# Patient Record
Sex: FEMALE | Race: WHITE | NOT HISPANIC OR LATINO
[De-identification: names, ages, dates, MRNs, and addresses within clinical notes are randomized per-mention and may not be internally consistent; named-entity substitution may affect disease eponyms.]

---

## 2020-01-17 ENCOUNTER — APPOINTMENT (OUTPATIENT)
Dept: OTOLARYNGOLOGY | Facility: CLINIC | Age: 70
End: 2020-01-17
Payer: MEDICARE

## 2020-01-17 VITALS — BODY MASS INDEX: 18.44 KG/M2 | HEIGHT: 64 IN | WEIGHT: 108 LBS

## 2020-01-17 VITALS
OXYGEN SATURATION: 98 % | SYSTOLIC BLOOD PRESSURE: 165 MMHG | TEMPERATURE: 98.3 F | HEART RATE: 86 BPM | DIASTOLIC BLOOD PRESSURE: 81 MMHG

## 2020-01-17 DIAGNOSIS — R42 DIZZINESS AND GIDDINESS: ICD-10-CM

## 2020-01-17 PROBLEM — Z00.00 ENCOUNTER FOR PREVENTIVE HEALTH EXAMINATION: Status: ACTIVE | Noted: 2020-01-17

## 2020-01-17 PROCEDURE — 99204 OFFICE O/P NEW MOD 45 MIN: CPT

## 2020-01-18 PROBLEM — R42 DISEQUILIBRIUM: Status: ACTIVE | Noted: 2020-01-18

## 2020-01-18 PROBLEM — R42 VERTIGO: Status: ACTIVE | Noted: 2020-01-17

## 2020-01-18 RX ORDER — ESTRADIOL 10 UG/1
TABLET, FILM COATED VAGINAL
Refills: 0 | Status: ACTIVE | COMMUNITY

## 2020-01-18 RX ORDER — PROGESTERONE 200 MG/1
CAPSULE ORAL
Refills: 0 | Status: ACTIVE | COMMUNITY

## 2020-01-18 RX ORDER — ASPIRIN 325 MG/1
TABLET, FILM COATED ORAL
Refills: 0 | Status: ACTIVE | COMMUNITY

## 2020-01-18 RX ORDER — B-COMPLEX WITH VITAMIN C
TABLET ORAL
Refills: 0 | Status: ACTIVE | COMMUNITY

## 2020-01-18 NOTE — PHYSICAL EXAM
[Midline] : trachea located in midline position [de-identified] : gait steady [Normal] : no rashes [] : [place]-Hallpike test is negative

## 2020-01-18 NOTE — DATA REVIEWED
[de-identified] : outside mri from 2015 and 2018 no vonda - mastoid congestion, sinus congestion and age related changes

## 2020-01-18 NOTE — HISTORY OF PRESENT ILLNESS
[de-identified] : 68 yo woman with h/o disequlibrium in the past for at least 5 yrs  last weekend had true vertigo - now is back to disequilibrium - happened with posn's ,improved over hours then happened the next day - on web it said to elevate hob - brought outside records. She feels the sensation is severe when it happens. denies hearing changes or tinnitus. no uri reently. no fh relevant to cc. nonsmoker.

## 2020-01-21 ENCOUNTER — APPOINTMENT (OUTPATIENT)
Dept: OTOLARYNGOLOGY | Facility: CLINIC | Age: 70
End: 2020-01-21
Payer: MEDICARE

## 2020-01-21 ENCOUNTER — APPOINTMENT (OUTPATIENT)
Dept: OTOLARYNGOLOGY | Facility: CLINIC | Age: 70
End: 2020-01-21

## 2020-01-21 VITALS
TEMPERATURE: 98.3 F | OXYGEN SATURATION: 99 % | HEART RATE: 93 BPM | DIASTOLIC BLOOD PRESSURE: 75 MMHG | SYSTOLIC BLOOD PRESSURE: 157 MMHG

## 2020-01-21 PROCEDURE — 99214 OFFICE O/P EST MOD 30 MIN: CPT

## 2020-01-22 NOTE — HISTORY OF PRESENT ILLNESS
[de-identified] : followup 70 yo woman with dizziness - she did not have  or vng because she said she had vng yrs ago and was afraid it would make her dizzy. she had mri and is here to review the results. he dizziness is bothering her a lot but her gait is rapid and steady. it is neither worsening or improving since last visit 3 d ago;

## 2020-01-22 NOTE — DATA REVIEWED
[de-identified] : mri with scant l mastoid opacification unchanged from prior study 1.5 yrs ago and age related changes

## 2020-01-23 ENCOUNTER — APPOINTMENT (OUTPATIENT)
Dept: OTOLARYNGOLOGY | Facility: CLINIC | Age: 70
End: 2020-01-23
Payer: MEDICARE

## 2020-01-23 VITALS
HEART RATE: 87 BPM | SYSTOLIC BLOOD PRESSURE: 147 MMHG | TEMPERATURE: 97.2 F | OXYGEN SATURATION: 98 % | DIASTOLIC BLOOD PRESSURE: 75 MMHG

## 2020-01-23 DIAGNOSIS — R42 DIZZINESS AND GIDDINESS: ICD-10-CM

## 2020-01-23 DIAGNOSIS — H92.02 OTALGIA, LEFT EAR: ICD-10-CM

## 2020-01-23 PROCEDURE — 99214 OFFICE O/P EST MOD 30 MIN: CPT

## 2020-01-23 PROCEDURE — 92550 TYMPANOMETRY & REFLEX THRESH: CPT

## 2020-01-23 PROCEDURE — 92557 COMPREHENSIVE HEARING TEST: CPT

## 2020-01-24 PROBLEM — R42 DIZZINESS: Status: ACTIVE | Noted: 2020-01-22

## 2020-01-24 PROBLEM — H92.02 OTALGIA, LEFT EAR: Status: ACTIVE | Noted: 2020-01-24

## 2020-01-24 NOTE — REASON FOR VISIT
[Subsequent Evaluation] : a subsequent evaluation for [FreeTextEntry2] : followup dizziness and otalgia (left)

## 2020-01-24 NOTE — PHYSICAL EXAM
[de-identified] : l>r [] : [place]-Hallpike test is negative [Normal] : no rashes [de-identified] : gait steady

## 2020-01-24 NOTE — HISTORY OF PRESENT ILLNESS
[de-identified] : followup dizziness and l otalgia - she had ct and is here to review because of small abnormality in l tm - she was concerned about it and wanted more investigaion. -f.s.c - the otalgia is dull and moderate. denies tmj.

## 2020-01-31 ENCOUNTER — APPOINTMENT (OUTPATIENT)
Dept: OTOLARYNGOLOGY | Facility: CLINIC | Age: 70
End: 2020-01-31

## 2025-04-05 VITALS
RESPIRATION RATE: 18 BRPM | HEART RATE: 105 BPM | TEMPERATURE: 98 F | OXYGEN SATURATION: 98 % | SYSTOLIC BLOOD PRESSURE: 157 MMHG | DIASTOLIC BLOOD PRESSURE: 83 MMHG | WEIGHT: 108.03 LBS

## 2025-04-05 LAB
ALBUMIN SERPL ELPH-MCNC: 4.5 G/DL — SIGNIFICANT CHANGE UP (ref 3.3–5)
ALP SERPL-CCNC: 83 U/L — SIGNIFICANT CHANGE UP (ref 40–120)
ALT FLD-CCNC: 22 U/L — SIGNIFICANT CHANGE UP (ref 10–45)
ANION GAP SERPL CALC-SCNC: 16 MMOL/L — SIGNIFICANT CHANGE UP (ref 5–17)
AST SERPL-CCNC: 30 U/L — SIGNIFICANT CHANGE UP (ref 10–40)
BASOPHILS # BLD AUTO: 0.05 K/UL — SIGNIFICANT CHANGE UP (ref 0–0.2)
BASOPHILS NFR BLD AUTO: 0.3 % — SIGNIFICANT CHANGE UP (ref 0–2)
BILIRUB DIRECT SERPL-MCNC: 0.2 MG/DL — SIGNIFICANT CHANGE UP (ref 0–0.3)
BILIRUB INDIRECT FLD-MCNC: 0.5 MG/DL — SIGNIFICANT CHANGE UP (ref 0.2–1)
BILIRUB SERPL-MCNC: 0.7 MG/DL — SIGNIFICANT CHANGE UP (ref 0.2–1.2)
BUN SERPL-MCNC: 20 MG/DL — SIGNIFICANT CHANGE UP (ref 7–23)
CALCIUM SERPL-MCNC: 9.6 MG/DL — SIGNIFICANT CHANGE UP (ref 8.4–10.5)
CHLORIDE SERPL-SCNC: 101 MMOL/L — SIGNIFICANT CHANGE UP (ref 96–108)
CO2 SERPL-SCNC: 22 MMOL/L — SIGNIFICANT CHANGE UP (ref 22–31)
CREAT SERPL-MCNC: 0.74 MG/DL — SIGNIFICANT CHANGE UP (ref 0.5–1.3)
EGFR: 85 ML/MIN/1.73M2 — SIGNIFICANT CHANGE UP
EGFR: 85 ML/MIN/1.73M2 — SIGNIFICANT CHANGE UP
EOSINOPHIL # BLD AUTO: 0 K/UL — SIGNIFICANT CHANGE UP (ref 0–0.5)
EOSINOPHIL NFR BLD AUTO: 0 % — SIGNIFICANT CHANGE UP (ref 0–6)
GLUCOSE SERPL-MCNC: 127 MG/DL — HIGH (ref 70–99)
HCT VFR BLD CALC: 44.6 % — SIGNIFICANT CHANGE UP (ref 34.5–45)
HGB BLD-MCNC: 14.9 G/DL — SIGNIFICANT CHANGE UP (ref 11.5–15.5)
IMM GRANULOCYTES NFR BLD AUTO: 0.4 % — SIGNIFICANT CHANGE UP (ref 0–0.9)
LACTATE SERPL-SCNC: 2.1 MMOL/L — HIGH (ref 0.5–2)
LIDOCAIN IGE QN: 19 U/L — SIGNIFICANT CHANGE UP (ref 7–60)
LYMPHOCYTES # BLD AUTO: 1.13 K/UL — SIGNIFICANT CHANGE UP (ref 1–3.3)
LYMPHOCYTES # BLD AUTO: 6.9 % — LOW (ref 13–44)
MCHC RBC-ENTMCNC: 29.5 PG — SIGNIFICANT CHANGE UP (ref 27–34)
MCHC RBC-ENTMCNC: 33.4 G/DL — SIGNIFICANT CHANGE UP (ref 32–36)
MCV RBC AUTO: 88.3 FL — SIGNIFICANT CHANGE UP (ref 80–100)
MONOCYTES # BLD AUTO: 0.7 K/UL — SIGNIFICANT CHANGE UP (ref 0–0.9)
MONOCYTES NFR BLD AUTO: 4.3 % — SIGNIFICANT CHANGE UP (ref 2–14)
NEUTROPHILS # BLD AUTO: 14.4 K/UL — HIGH (ref 1.8–7.4)
NEUTROPHILS NFR BLD AUTO: 88.1 % — HIGH (ref 43–77)
NRBC BLD AUTO-RTO: 0 /100 WBCS — SIGNIFICANT CHANGE UP (ref 0–0)
PLATELET # BLD AUTO: 242 K/UL — SIGNIFICANT CHANGE UP (ref 150–400)
POTASSIUM SERPL-MCNC: 4 MMOL/L — SIGNIFICANT CHANGE UP (ref 3.5–5.3)
POTASSIUM SERPL-SCNC: 4 MMOL/L — SIGNIFICANT CHANGE UP (ref 3.5–5.3)
PROT SERPL-MCNC: 7.3 G/DL — SIGNIFICANT CHANGE UP (ref 6–8.3)
RBC # BLD: 5.05 M/UL — SIGNIFICANT CHANGE UP (ref 3.8–5.2)
RBC # FLD: 13.5 % — SIGNIFICANT CHANGE UP (ref 10.3–14.5)
SODIUM SERPL-SCNC: 139 MMOL/L — SIGNIFICANT CHANGE UP (ref 135–145)
WBC # BLD: 16.34 K/UL — HIGH (ref 3.8–10.5)
WBC # FLD AUTO: 16.34 K/UL — HIGH (ref 3.8–10.5)

## 2025-04-05 PROCEDURE — 99285 EMERGENCY DEPT VISIT HI MDM: CPT | Mod: FS

## 2025-04-05 PROCEDURE — 74176 CT ABD & PELVIS W/O CONTRAST: CPT | Mod: 26

## 2025-04-05 RX ORDER — IOHEXOL 350 MG/ML
30 INJECTION, SOLUTION INTRAVENOUS ONCE
Refills: 0 | Status: COMPLETED | OUTPATIENT
Start: 2025-04-05 | End: 2025-04-05

## 2025-04-05 RX ADMIN — Medication 1000 MILLILITER(S): at 20:21

## 2025-04-05 RX ADMIN — IOHEXOL 30 MILLILITER(S): 350 INJECTION, SOLUTION INTRAVENOUS at 19:59

## 2025-04-05 NOTE — ED PROVIDER NOTE - PROGRESS NOTE DETAILS
Patient refusing IV contrast, mentions 2 previous reactions to contrast.  One reaction was infiltration of contrast into arm.  One reaction was delayed erythema/rash from chest up (no respiratory compromise, vomiting, or diarrhea).  Discussed diagnostic options with patient and , including suboptimal CT without IV contrast and premedication for CTAP +/+.  After consideration of risks and benefits patient selects CT without IV contrast. CT remarkable for cecal bascule.  Case discussed with surgery consult team, pending recommendations. Will sign out to Dr. Che: 74F with LLQ abd pain and bloody stool, found on CT with cecal bascule, pending surgery recommendations.  Dispo as per surgery. Ferchoel - surgery recommending repeat CT scan without IV contrast; once performed can be admitted to surgery/telemetry.

## 2025-04-05 NOTE — ED ADULT NURSE NOTE - OBJECTIVE STATEMENT
Pt is a 75yo female presenting to ED c/o abdominal pain. Pt reports having 5/10 LLQ abdominal pain beginning at 0700 this morning along with 5 episodes of diarrhea that started possessing blood at 1500. Pt abdomen noted to be soft, nondistended, NTTP. Pt A&Ox4, breathing even and unlabored speaking in clear full sentences, ambulatory with steady gait, denies lightheadedness, dizziness, n/v, h/a, c/p, sob, f/c, vital signs stable, placed in gown.

## 2025-04-05 NOTE — ED PROVIDER NOTE - OBJECTIVE STATEMENT
74F with hx ischemic colitis, no past medical history, now with 1 day of LLQ abdominal pain and bloody stool.  No vomiting.  Abdominal pain now improving.  Feels like past episode of ischemic colitis (no previous etiology identified).  No fever, chest pain, or trouble breathing.  No dysuria or vaginal bleeding.    Currently declines pain medication.

## 2025-04-05 NOTE — ED PROVIDER NOTE - NS_EDPROVIDERDISPOUSERTYPE_ED_A_ED
RN attempted to call patient back to assess low dose induction status. The call went to . RN left a message to call the clinic back for a check in. RN encouraged patient to present to [facility] beginning Monday 3/11/2024. Walk-in hours provided.     Routing update to provider.     Janis Knight RN on 3/8/2024 at 2:54 PM     Attending Attestation (For Attendings USE Only)...

## 2025-04-05 NOTE — ED PROVIDER NOTE - PHYSICAL EXAMINATION
General: comfortable, resting in ED  HEENT: atraumatic, no eye erythema or discharge  Pulm: no cyanosis, no added work of breathing  Cardiac: no pallor, intact peripheral pulse  GI: no abdominal distension, nontender abdomen in all 4 quadrants  Neuro: alert, conversant  Psych: neutral affect, cooperative  Msk: no gross deformity or instability  Skin: no erythema or rash    Had bowel movement in ED with red formed stool but no bright red blood or melena.

## 2025-04-05 NOTE — ED PROVIDER NOTE - CLINICAL SUMMARY MEDICAL DECISION MAKING FREE TEXT BOX
Concern for abdominal pain of unclear etiology.  Given age, despite nontender abdomen must r/o acute intra-abdominal process ?sbo ?perforation ?appendicitis ?diverticulitis ?intussusception ?volvulus.  Will screen for bowel ischemia via lactate and will screen for severe sequelae of bowel ischemia on CT.  R/o gross metabolic abnormality in the setting of bloody diarrhea ?anemia ?hypoglycemia ?calixto ?hypernatremia.  Patient currently declining pain medication but will reassess as needed.

## 2025-04-05 NOTE — ED ADULT NURSE REASSESSMENT NOTE - NS ED NURSE REASSESS COMMENT FT1
Received report from Carlin ORELLANA . Received patient in stretcher. AOX4. Vital signs as noted in flowsheet.  Patient denies chest pain, shortness of breath, difficulty breathing and any form of distress not noted. Patient oriented to ED area. Plan of care discussed and verbalized understanding. All needs attended. Purposeful proactive hourly rounding in progress. (2) probably inadequate

## 2025-04-06 ENCOUNTER — INPATIENT (INPATIENT)
Facility: HOSPITAL | Age: 75
LOS: 4 days | Discharge: HOME | End: 2025-04-11
Attending: SURGERY | Admitting: SURGERY
Payer: MEDICARE

## 2025-04-06 LAB
ADD ON TEST-SPECIMEN IN LAB: SIGNIFICANT CHANGE UP
ANION GAP SERPL CALC-SCNC: 10 MMOL/L — SIGNIFICANT CHANGE UP (ref 5–17)
BUN SERPL-MCNC: 14 MG/DL — SIGNIFICANT CHANGE UP (ref 7–23)
C DIFF GDH STL QL: NEGATIVE — SIGNIFICANT CHANGE UP
C DIFF GDH STL QL: SIGNIFICANT CHANGE UP
CALCIUM SERPL-MCNC: 8.5 MG/DL — SIGNIFICANT CHANGE UP (ref 8.4–10.5)
CHLORIDE SERPL-SCNC: 102 MMOL/L — SIGNIFICANT CHANGE UP (ref 96–108)
CO2 SERPL-SCNC: 25 MMOL/L — SIGNIFICANT CHANGE UP (ref 22–31)
CREAT SERPL-MCNC: 0.69 MG/DL — SIGNIFICANT CHANGE UP (ref 0.5–1.3)
EGFR: 92 ML/MIN/1.73M2 — SIGNIFICANT CHANGE UP
EGFR: 92 ML/MIN/1.73M2 — SIGNIFICANT CHANGE UP
GI PCR PANEL: SIGNIFICANT CHANGE UP
GLUCOSE SERPL-MCNC: 101 MG/DL — HIGH (ref 70–99)
HCT VFR BLD CALC: 37.4 % — SIGNIFICANT CHANGE UP (ref 34.5–45)
HCT VFR BLD CALC: 39 % — SIGNIFICANT CHANGE UP (ref 34.5–45)
HGB BLD-MCNC: 12.4 G/DL — SIGNIFICANT CHANGE UP (ref 11.5–15.5)
HGB BLD-MCNC: 13.2 G/DL — SIGNIFICANT CHANGE UP (ref 11.5–15.5)
LACTATE SERPL-SCNC: 1 MMOL/L — SIGNIFICANT CHANGE UP (ref 0.5–2)
MAGNESIUM SERPL-MCNC: 2 MG/DL — SIGNIFICANT CHANGE UP (ref 1.6–2.6)
MCHC RBC-ENTMCNC: 29.2 PG — SIGNIFICANT CHANGE UP (ref 27–34)
MCHC RBC-ENTMCNC: 29.9 PG — SIGNIFICANT CHANGE UP (ref 27–34)
MCHC RBC-ENTMCNC: 33.2 G/DL — SIGNIFICANT CHANGE UP (ref 32–36)
MCHC RBC-ENTMCNC: 33.8 G/DL — SIGNIFICANT CHANGE UP (ref 32–36)
MCV RBC AUTO: 88.2 FL — SIGNIFICANT CHANGE UP (ref 80–100)
MCV RBC AUTO: 88.4 FL — SIGNIFICANT CHANGE UP (ref 80–100)
NRBC BLD AUTO-RTO: 0 /100 WBCS — SIGNIFICANT CHANGE UP (ref 0–0)
NRBC BLD AUTO-RTO: 0 /100 WBCS — SIGNIFICANT CHANGE UP (ref 0–0)
PHOSPHATE SERPL-MCNC: 3.1 MG/DL — SIGNIFICANT CHANGE UP (ref 2.5–4.5)
PLATELET # BLD AUTO: 207 K/UL — SIGNIFICANT CHANGE UP (ref 150–400)
PLATELET # BLD AUTO: 209 K/UL — SIGNIFICANT CHANGE UP (ref 150–400)
POTASSIUM SERPL-MCNC: 3.6 MMOL/L — SIGNIFICANT CHANGE UP (ref 3.5–5.3)
POTASSIUM SERPL-SCNC: 3.6 MMOL/L — SIGNIFICANT CHANGE UP (ref 3.5–5.3)
RBC # BLD: 4.24 M/UL — SIGNIFICANT CHANGE UP (ref 3.8–5.2)
RBC # BLD: 4.41 M/UL — SIGNIFICANT CHANGE UP (ref 3.8–5.2)
RBC # FLD: 13.4 % — SIGNIFICANT CHANGE UP (ref 10.3–14.5)
RBC # FLD: 13.6 % — SIGNIFICANT CHANGE UP (ref 10.3–14.5)
SODIUM SERPL-SCNC: 137 MMOL/L — SIGNIFICANT CHANGE UP (ref 135–145)
WBC # BLD: 10.96 K/UL — HIGH (ref 3.8–10.5)
WBC # BLD: 12.71 K/UL — HIGH (ref 3.8–10.5)
WBC # FLD AUTO: 10.96 K/UL — HIGH (ref 3.8–10.5)
WBC # FLD AUTO: 12.71 K/UL — HIGH (ref 3.8–10.5)

## 2025-04-06 PROCEDURE — 74176 CT ABD & PELVIS W/O CONTRAST: CPT | Mod: 26

## 2025-04-06 PROCEDURE — 93010 ELECTROCARDIOGRAM REPORT: CPT

## 2025-04-06 PROCEDURE — 99223 1ST HOSP IP/OBS HIGH 75: CPT

## 2025-04-06 RX ORDER — HEPARIN SODIUM 1000 [USP'U]/ML
5000 INJECTION INTRAVENOUS; SUBCUTANEOUS EVERY 8 HOURS
Refills: 0 | Status: DISCONTINUED | OUTPATIENT
Start: 2025-04-06 | End: 2025-04-06

## 2025-04-06 RX ORDER — ACETAMINOPHEN 500 MG/5ML
1000 LIQUID (ML) ORAL EVERY 8 HOURS
Refills: 0 | Status: DISCONTINUED | OUTPATIENT
Start: 2025-04-06 | End: 2025-04-06

## 2025-04-06 RX ORDER — ASPIRIN 325 MG
1 TABLET ORAL
Refills: 0 | DISCHARGE

## 2025-04-06 RX ORDER — ONDANSETRON HCL/PF 4 MG/2 ML
4 VIAL (ML) INJECTION EVERY 6 HOURS
Refills: 0 | Status: DISCONTINUED | OUTPATIENT
Start: 2025-04-06 | End: 2025-04-11

## 2025-04-06 RX ORDER — PROGESTERONE 200 MG/1
1 CAPSULE ORAL
Refills: 0 | DISCHARGE

## 2025-04-06 RX ORDER — METRONIDAZOLE 250 MG
500 TABLET ORAL EVERY 8 HOURS
Refills: 0 | Status: DISCONTINUED | OUTPATIENT
Start: 2025-04-06 | End: 2025-04-11

## 2025-04-06 RX ORDER — ESTRADIOL 0.05MG/24H
1 PATCH, TRANSDERMAL SEMIWEEKLY TRANSDERMAL
Refills: 0 | DISCHARGE

## 2025-04-06 RX ORDER — HYDROMORPHONE/SOD CHLOR,ISO/PF 2 MG/10 ML
0.5 SYRINGE (ML) INJECTION
Refills: 0 | Status: DISCONTINUED | OUTPATIENT
Start: 2025-04-06 | End: 2025-04-07

## 2025-04-06 RX ORDER — CEFTRIAXONE 500 MG/1
1000 INJECTION, POWDER, FOR SOLUTION INTRAMUSCULAR; INTRAVENOUS EVERY 24 HOURS
Refills: 0 | Status: DISCONTINUED | OUTPATIENT
Start: 2025-04-06 | End: 2025-04-11

## 2025-04-06 RX ORDER — SODIUM CHLORIDE 9 G/1000ML
1000 INJECTION, SOLUTION INTRAVENOUS
Refills: 0 | Status: DISCONTINUED | OUTPATIENT
Start: 2025-04-06 | End: 2025-04-07

## 2025-04-06 RX ORDER — ACETAMINOPHEN 500 MG/5ML
1000 LIQUID (ML) ORAL EVERY 6 HOURS
Refills: 0 | Status: DISCONTINUED | OUTPATIENT
Start: 2025-04-06 | End: 2025-04-07

## 2025-04-06 RX ORDER — HEPARIN SODIUM 1000 [USP'U]/ML
5000 INJECTION INTRAVENOUS; SUBCUTANEOUS EVERY 12 HOURS
Refills: 0 | Status: DISCONTINUED | OUTPATIENT
Start: 2025-04-06 | End: 2025-04-06

## 2025-04-06 RX ADMIN — Medication 100 MILLIGRAM(S): at 14:44

## 2025-04-06 RX ADMIN — CEFTRIAXONE 100 MILLIGRAM(S): 500 INJECTION, POWDER, FOR SOLUTION INTRAMUSCULAR; INTRAVENOUS at 09:04

## 2025-04-06 RX ADMIN — Medication 20 MILLIGRAM(S): at 13:06

## 2025-04-06 RX ADMIN — Medication 100 MILLIGRAM(S): at 21:39

## 2025-04-06 RX ADMIN — SODIUM CHLORIDE 75 MILLILITER(S): 9 INJECTION, SOLUTION INTRAVENOUS at 03:22

## 2025-04-06 NOTE — H&P ADULT - ATTENDING COMMENTS
Feeling better.  Afebrile.  Passing some flatus. Had BMs with some blood. No quentin blood per rectum.  Repeat CT - no cecal bascule. No SBO or LBO.  Inflammatory changes in colon as described. Favor infectious changes over ischemic.  Labs noted.   Continue current care.  Await GI service input. Feeling better.  Afebrile.  Passing some flatus. Had BMs with some blood. No quentin blood per rectum. However last night in ER had some blood clots per rectum.  Repeat CT - no cecal bascule. No SBO or LBO.  Per patient she had a similar episode 6-7 years ago and was told she had ischemic colitis at the time. Had several colonoscopies and EGDs since. Has GERG, hiatal hernia. Colonoscopies per patient were normal. She will attempt to download her reports from patient portal ([facility])  Inflammatory changes in colon as described. GI PCR negative.  Continue current care.  Await GI service input.  Will discus radiologic study(CTA vs MRA with radiology - patient thinks she has allergy to iodine).

## 2025-04-06 NOTE — CONSULT NOTE ADULT - SUBJECTIVE AND OBJECTIVE BOX
INTERNAL MEDICINE - INITIAL CONSULT NOTE    ISRRAELCHULA UNDERWOOD  [id number]    Patient is a 74y old  Female who presents with a chief complaint of Colitis (06 Apr 2025 03:27)      HPI:  75yo Female pt with PMH of diverticulitis, ischemic colitis (2016), PSx of adenoidectomy, hysteroscopy. Presents due to LLQ pain that has been on-going for past 24 hours, vague and varies between sharp to dull, pt feels the pain is similar to pain from prior ischemic colitis episodes, no nausea or vomiting, no fevers or chills, loose stools (multiple since yesterday) 5-6 loose stools in the ED and a few small fresh blood specs in the toilet bowl. Due to persistent fresh specs of blood, patient decided to come to the ED.      PMH: Diverticulitis; Ischemic colitis   PSHx: adenoidectomy; hystereoscopy   Medications: 0.5mg Estrogen daily and 100mg Progesterone - every 3rd day (for post menopausal); ASA 81 q3 days   Allergies: NKDA; IV contrast allergy (hives)  Social Hx: quit smoking >20 years ago   Family Hx: Denies family hx of IBS, Crohn's, UC, or colon cancer.  Last colonoscopy: 2022 and normal  Last EGD: >10 years ago         T(C): 36.5 (04-05-25 @ 22:05), Max: 36.6 (04-05-25 @ 19:14)  HR: 103 (04-05-25 @ 22:05) (89 - 105)  BP: 180/77 (04-05-25 @ 22:05) (157/83 - 180/77)  RR: 18 (04-05-25 @ 22:05) (17 - 18)  SpO2: 99% (04-05-25 @ 22:05) (98% - 99%)        General: AAOx3, NAD, laying comfortably in bed  Cardio: S1,S2, normotensive; normal HR   Pulm: Nonlabored breathing  Abdomen: soft; non-distended; mildly tender in the LLQ, no rebound or guarding  Extremities: WWP, peripheral pulses appreciated          LABS:                        14.9   16.34 )-----------( 242      ( 05 Apr 2025 19:37 )             44.6     04-05    139  |  101  |  20  ----------------------------<  127[H]  4.0   |  22  |  0.74    Ca    9.6      05 Apr 2025 19:37    TPro  7.3  /  Alb  4.5  /  TBili  0.7  /  DBili  0.2  /  AST  30  /  ALT  22  /  AlkPhos  83  04-05      IMAGING     FINDINGS:  LOWER CHEST: Within normal limits.    LIVER: Within normal limits.  BILE DUCTS: Normal caliber.  GALLBLADDER: Within normal limits.  SPLEEN: Within normal limits.  PANCREAS: Within normal limits.  ADRENALS: Within normal limits.  KIDNEYS/URETERS: Within normal limits.    BLADDER: Within normal limits.  REPRODUCTIVE ORGANS: Enlarged myomatous uterus.    BOWEL: The cecum is in a superior position without evidence of volvulus this likely represents a cecal bascule. The descending colon and proximal sigmoid colon may be thickened versus underdistended. Limited evaluation without intravenous contrast. No bowel obstruction. Appendix is normal.  PERITONEUM/RETROPERITONEUM: Within normal limits.  VESSELS: Atherosclerotic changes.  LYMPH NODES: No lymphadenopathy.  ABDOMINAL WALL: Within normal limits.  BONES: Within normal limits.    IMPRESSION: Limited evaluation without intravenous contrast.    The cecum is in a superior position without evidence of volvulus this likely represents a cecal bascule. The descending colon and proximal sigmoid colon may be thickened versus underdistended. Limited evaluation without intravenous contrast. No bowel obstruction.    --- End of Report ---    JOS STALEY MD; Attending Radiologist  This document has been electronically signed. Apr 5 2025 9:53PM      REPEAT  CT   FINDINGS:  LOWER CHEST: Within normal limits.    LIVER: Within normal limits.  BILE DUCTS: Normal caliber.  GALLBLADDER:Within normal limits.  SPLEEN: Within normal limits.  PANCREAS: Within normal limits.  ADRENALS: Within normal limits.  KIDNEYS/URETERS: Within normal limits.    BLADDER: Within normal limits.  REPRODUCTIVE ORGANS: Fibroid uterus.    BOWEL: The cecumis now in normal position. The descending colon and   sigmoid colon are likely thickened and inflammatory changes are seen   surrounding the descending colon for example (3:81). This is better seen   on this exam when compared to the prior. Findings suspicious for colitis.   Correlate clinically. No bowel obstruction. Appendix is normal.  PERITONEUM/RETROPERITONEUM: Within normal limits.  VESSELS: Atherosclerotic changes.  LYMPH NODES: No lymphadenopathy.  ABDOMINAL WALL: Within normal limits.  BONES: Within normal limits.    IMPRESSION:    The cecum is now in normal position. The descending colon and sigmoid   colon are likely thickened and inflammatory changes are seen surrounding   the descending colon for example (3:81). This is better seen on this exam   when compared to the prior. Findings suspicious for colitis. Correlate   clinically.    --- End of Report ---    FRANKIE ADHIKARI MD; Resident Radiologist  This document has been electronically signed.  JOS STALEY MD; Attending Radiologist  This document has been electronically signed. Apr 6 2025  2:39AM           (06 Apr 2025 03:27)      PAST MEDICAL & SURGICAL HISTORY:      acetaminophen     Tablet .. 1000 milliGRAM(s) Oral every 6 hours  cefTRIAXone   IVPB 1000 milliGRAM(s) IV Intermittent every 24 hours  heparin   Injectable 5000 Unit(s) SubCutaneous every 12 hours  HYDROmorphone  Injectable 0.5 milliGRAM(s) IV Push every 3 hours PRN  lactated ringers. 1000 milliLiter(s) IV Continuous <Continuous>  metroNIDAZOLE  IVPB 500 milliGRAM(s) IV Intermittent every 8 hours  ondansetron Injectable 4 milliGRAM(s) IV Push every 6 hours PRN  pantoprazole  Injectable 20 milliGRAM(s) IV Push daily      FAMILY HISTORY:      REVIEW OF SYSTEMS:  CONSTITUTIONAL: No weakness, fever, or chills  EYES: No eye pain or discharge  ENMT:  No sinus or throat pain  NECK: No pain or stiffness  RESPIRATORY: No cough, wheezing, chills or hemoptysis; No shortness of breath  CARDIOVASCULAR: No chest pain, palpitations, dizziness, or leg swelling  GASTROINTESTINAL: No abdominal or epigastric pain. No nausea, vomiting, or hematemesis; No diarrhea or constipation. No melena or hematochezia.  GENITOURINARY: No dysuria or incontinence  NEUROLOGICAL: No headaches, memory loss, loss of strength, numbness, or tremors  SKIN: No new rashes  MUSCULOSKELETAL: No joint pain or swelling; No muscle, back, or extremity pain  HEME/LYMPH: No easy bruising, or bleeding gums      PHYSICAL EXAM:  T(C): 37.2 (04-06-25 @ 08:30), Max: 37.2 (04-06-25 @ 08:30)  HR: 77 (04-06-25 @ 08:30) (77 - 105)  BP: 133/60 (04-06-25 @ 08:30) (133/60 - 180/77)  RR: 18 (04-06-25 @ 08:30) (16 - 18)  SpO2: 95% (04-06-25 @ 08:30) (95% - 99%)    General: NAD, laying in bed, speaking in full sentences  HEENT: head NC/AT, no conjunctival injection, EOMI, MMM  Neck: supple, no JVD  Cardio: RRR, +S1/S2, no M/R/G  Resp: lungs CTAB, no cough/wheezes/rales/rhonchi  Abdo: soft, NT, ND, +bowel sounds x4, no organomegaly or palpable mass    Extremities: WWP, no edema/cyanosis/clubbing   Vasc: 2+ radial and DP pulses b/l  Neuro: A&Ox3  Psych: speech non-pressured, thoughts goal-oriented  Skin: dry, intact, no visible jaundice   MSK: no joint swelling      Consultant(s) Notes Reviewed:  [x ] YES  [ ] NO  Care Discussed with Consultants/Other Providers [ x] YES  [ ] NO    LABS:      04-06-25 @ 07:01  -  04-06-25 @ 12:59  --------------------------------------------------------  IN: 455 mL / OUT: 0 mL / NET: 455 mL          RADIOLOGY & ADDITIONAL TESTS:    Imaging Personally Reviewed:  [X] YES  [ ] NO INTERNAL MEDICINE - INITIAL CONSULT NOTE    ISACCCHULA DARLING  [id number]    Patient is a 74y old  Female who presents with a chief complaint of Colitis (06 Apr 2025 03:27)      HPI:  75yo Female pt with PMH of diverticulitis, ischemic colitis (2016), PSx of adenoidectomy, hysteroscopy. Presents due to LLQ pain that has been on-going for past 24 hours, vague and varies between sharp to dull, pt feels the pain is similar to pain from prior ischemic colitis episodes, no nausea or vomiting, no fevers or chills, loose stools (multiple since yesterday) 5-6 loose stools in the ED and a few small fresh blood specs in the toilet bowl. Due to persistent fresh specs of blood, patient decided to come to the ED.      PMH: Diverticulitis; Ischemic colitis   PSHx: adenoidectomy; hystereoscopy   Medications: 0.5mg Estrogen daily and 100mg Progesterone - every 3rd day (for post menopausal); ASA 81 q3 days   Allergies: NKDA; IV contrast allergy (hives)  Social Hx: quit smoking >20 years ago   Family Hx: Denies family hx of IBS, Crohn's, UC, or colon cancer.  Last colonoscopy: 2022 and normal  Last EGD: >10 years ago       Interval History: Patient feels okay today. Has had still persistent blood per rectum. Some times blood mixed with stool and sometimes quentin blood. Mild-moderate LLQ and LUQ abdominal pain. No nausea or vomiting. She denies fever or chills. Patient states that after ceftriaxone dose this morning she felt it made her abdominal pain worse so is hesitant about c/w CTX.           PAST MEDICAL & SURGICAL HISTORY:      acetaminophen     Tablet .. 1000 milliGRAM(s) Oral every 6 hours  cefTRIAXone   IVPB 1000 milliGRAM(s) IV Intermittent every 24 hours  heparin   Injectable 5000 Unit(s) SubCutaneous every 12 hours  HYDROmorphone  Injectable 0.5 milliGRAM(s) IV Push every 3 hours PRN  lactated ringers. 1000 milliLiter(s) IV Continuous <Continuous>  metroNIDAZOLE  IVPB 500 milliGRAM(s) IV Intermittent every 8 hours  ondansetron Injectable 4 milliGRAM(s) IV Push every 6 hours PRN  pantoprazole  Injectable 20 milliGRAM(s) IV Push daily      FAMILY HISTORY:      REVIEW OF SYSTEMS:  CONSTITUTIONAL: No weakness, fever, or chills  EYES: No eye pain or discharge  ENMT:  No sinus or throat pain  NECK: No pain or stiffness  RESPIRATORY: No cough, wheezing, chills or hemoptysis; No shortness of breath  CARDIOVASCULAR: No chest pain, palpitations, dizziness, or leg swelling  GASTROINTESTINAL: No abdominal or epigastric pain. No nausea, vomiting, or hematemesis; No diarrhea or constipation. No melena or hematochezia.  GENITOURINARY: No dysuria or incontinence  NEUROLOGICAL: No headaches, memory loss, loss of strength, numbness, or tremors  SKIN: No new rashes  MUSCULOSKELETAL: No joint pain or swelling; No muscle, back, or extremity pain  HEME/LYMPH: No easy bruising, or bleeding gums      PHYSICAL EXAM:  T(C): 37.2 (04-06-25 @ 08:30), Max: 37.2 (04-06-25 @ 08:30)  HR: 77 (04-06-25 @ 08:30) (77 - 105)  BP: 133/60 (04-06-25 @ 08:30) (133/60 - 180/77)  RR: 18 (04-06-25 @ 08:30) (16 - 18)  SpO2: 95% (04-06-25 @ 08:30) (95% - 99%)    General: NAD, laying in bed, speaking in full sentences  HEENT: head NC/AT, no conjunctival injection, EOMI, MMM  Neck: supple, no JVD  Cardio: RRR, +S1/S2, no M/R/G  Resp: lungs CTAB, no cough/wheezes/rales/rhonchi  Abdo: soft, LLQ abdominal TTP, ND, +bowel sounds x4, no organomegaly or palpable mass    Extremities: WWP, no edema/cyanosis/clubbing   Vasc: 2+ radial and DP pulses b/l  Neuro: A&Ox3  Psych: speech non-pressured, thoughts goal-oriented  Skin: dry, intact, no visible jaundice   MSK: no joint swelling      Consultant(s) Notes Reviewed:  [x ] YES  [ ] NO  Care Discussed with Consultants/Other Providers [ x] YES  [ ] NO    LABS:      04-06-25 @ 07:01  -  04-06-25 @ 12:59  --------------------------------------------------------  IN: 455 mL / OUT: 0 mL / NET: 455 mL          RADIOLOGY & ADDITIONAL TESTS:    Imaging Personally Reviewed:  [X] YES  [ ] NO

## 2025-04-06 NOTE — H&P ADULT - REASON FOR ADMISSION
[address]   Progress Note  CHF/Pulmonary Hypertension Cardiology    Chief complaint: We are following this patient for unstable angina, 3 vessel CAD  · HPI:  GEN - New patient for cp. Cp pressure, nonradiating, substernal, associated with sob, exertional, relieved with rest, lasts 5 minutes or so. Risks - dm, htn, chol, hx tob, ?family hx. EKG in pcp office with anterior TWI, resolved today  · AAA - 4.3. Follows with fries. · HTN - Ambulatory BP readings in good range. No HA or dizziness. · CHOL - Last cholesterol reviewed and in good range. Tolerating statin without side effects. MED - Compliant with CV meds listed below without notable side effects. ROS: patient did not have any chest pain overnight. He has chronic leg pain, and this kept him awake last night. He has talked to Dr. Taurus Nicolas about undergoing CABG next week. He has talked to his family and has decided to proceed. He has anemia.     Labs:  Sodium 137  H/H 10.1/31.9  Ejection fraction 55% by cardiac cath  Cardiac Cath:  5/21/21:  Findings:  Artery Findings/Result   LM Normal   LAD 90% prox, 80% mid involving D1 ostial, 99% mid with ANTON 2 flow distal LAD   Cx 90% OM1, OM1 very ectatic   RI N/A   RCA 99% mid   LVEDP 10   LVG 55%, basal inferior hk          Medications/Labs all Reviewed    Lab Results   Component Value Date    WBC 5.8 05/22/2021    HGB 10.1 (L) 05/22/2021    HCT 31.9 (L) 05/22/2021    MCV 71.7 (L) 05/22/2021     05/22/2021     Lab Results   Component Value Date    CREATININE 0.8 05/22/2021    BUN 14 05/22/2021     05/22/2021    K 4.6 05/22/2021     05/22/2021    CO2 23 05/22/2021     Lab Results   Component Value Date    INR 1.08 05/22/2021    PROTIME 12.5 05/22/2021        Physical Examination:    BP (!) 143/58   Pulse 76   Temp 97.4 °F (36.3 °C) (Temporal)   Resp 18   Ht 5' 9\" (1.753 m)   Wt 192 lb (87.1 kg)   SpO2 97%   BMI 28.35 kg/m²      WD/WN  HEENT: NC/AT  Respiratory:  · Resp Assessment: Normal respiratory effort  · Resp Auscultation: Clear to auscultation bilaterally   Cardiovascular:  · Auscultation: regular rate and rhythm, normal S1S2, no murmur, rub or gallop  · Palpation:  Nl PMI  · JVP:  normal  · Extremities: No Edema  Abdomen:  · Soft, non-tender  · Normal bowel sounds  Extremities:  ·  No Cyanosis or Clubbing  Neurological/Psychiatric:  · Oriented to time, place, and person  · Non-anxious  Skin Warm and dry    Lab Results   Component Value Date     05/22/2021     05/21/2021     05/13/2021    K 4.6 05/22/2021    K 4.5 05/21/2021    K 5.5 05/13/2021    BUN 14 05/22/2021    BUN 19 05/21/2021    BUN 16 05/13/2021    CREATININE 0.8 05/22/2021    CREATININE 0.8 05/21/2021    CREATININE 0.9 05/13/2021    GLUCOSE 108 05/22/2021    GLUCOSE 107 05/21/2021     No results found for: PROBNP  Lab Results   Component Value Date    ALT 12 05/13/2021    ALT 12 08/13/2020    AST 14 (L) 05/13/2021    AST 14 (L) 08/13/2020     Lab Results   Component Value Date    HGB 10.1 05/22/2021    HGB 9.9 05/21/2021    HCT 31.9 05/22/2021    HCT 31.4 05/21/2021     05/22/2021     05/21/2021     Lab Results   Component Value Date    TRIG 85 05/13/2021    TRIG 130 08/13/2020    HDL 52 05/13/2021    HDL 50 08/13/2020    LDLCALC 163 05/13/2021    LDLCALC 185 08/13/2020     Labs were reviewed including labs from other hospital systems through [facility]. Cardiac testing was reviewed including echos, nuclear scans, cardiac catheterization, including from other hospital systems through [facility].     Assessment:    Active Problems:    Unstable angina (HCC)  Plan: CABG next week  3 vessel CAD:  Planning for CABG next week   Continue aspirin, toprol XL, crestor  Hypertension:  Continue amlodipine, lisinopril, toprol XL  Hyperlipidemia:  Continue crestor  Chronic leg pain:  Continue tramadol  Anemia:  Rule out iron deficiency    Plan for CABG next week per CT surgery team.        NYHA Class: 3    Megan Booker MD, 5/22/2021 9:18 AM Colitis

## 2025-04-06 NOTE — H&P ADULT - HISTORY OF PRESENT ILLNESS
73yo Female pt with PMH of diverticulitis, ischemic colitis (2016), PSx of adenoidectomy, hysteroscopy. Presents due to LLQ pain that has been on-going for past 24 hours, vague and varies between sharp to dull, pt feels the pain is similar to pain from prior ischemic colitis episodes, no nausea or vomiting, no fevers or chills, loose stools (multiple since yesterday) 5-6 loose stools in the ED and a few small fresh blood specs in the toilet bowl. Due to persistent fresh specs of blood, patient decided to come to the ED.      PMH: Diverticulitis; Ischemic colitis   PSHx: adenoidectomy; hystereoscopy   Medications: 0.5mg Estrogen daily and 100mg Progesterone - every 3rd day (for post menopausal); ASA 81 q3 days   Allergies: NKDA; IV contrast allergy (hives)  Social Hx: quit smoking >20 years ago   Family Hx: Denies family hx of IBS, Crohn's, UC, or colon cancer.  Last colonoscopy: 2022 and normal  Last EGD: >10 years ago         T(C): 36.5 (04-05-25 @ 22:05), Max: 36.6 (04-05-25 @ 19:14)  HR: 103 (04-05-25 @ 22:05) (89 - 105)  BP: 180/77 (04-05-25 @ 22:05) (157/83 - 180/77)  RR: 18 (04-05-25 @ 22:05) (17 - 18)  SpO2: 99% (04-05-25 @ 22:05) (98% - 99%)        General: AAOx3, NAD, laying comfortably in bed  Cardio: S1,S2, normotensive; normal HR   Pulm: Nonlabored breathing  Abdomen: soft; non-distended; mildly tender in the LLQ, no rebound or guarding  Extremities: WWP, peripheral pulses appreciated          LABS:                        14.9   16.34 )-----------( 242      ( 05 Apr 2025 19:37 )             44.6     04-05    139  |  101  |  20  ----------------------------<  127[H]  4.0   |  22  |  0.74    Ca    9.6      05 Apr 2025 19:37    TPro  7.3  /  Alb  4.5  /  TBili  0.7  /  DBili  0.2  /  AST  30  /  ALT  22  /  AlkPhos  83  04-05      IMAGING     FINDINGS:  LOWER CHEST: Within normal limits.    LIVER: Within normal limits.  BILE DUCTS: Normal caliber.  GALLBLADDER: Within normal limits.  SPLEEN: Within normal limits.  PANCREAS: Within normal limits.  ADRENALS: Within normal limits.  KIDNEYS/URETERS: Within normal limits.    BLADDER: Within normal limits.  REPRODUCTIVE ORGANS: Enlarged myomatous uterus.    BOWEL: The cecum is in a superior position without evidence of volvulus this likely represents a cecal bascule. The descending colon and proximal sigmoid colon may be thickened versus underdistended. Limited evaluation without intravenous contrast. No bowel obstruction. Appendix is normal.  PERITONEUM/RETROPERITONEUM: Within normal limits.  VESSELS: Atherosclerotic changes.  LYMPH NODES: No lymphadenopathy.  ABDOMINAL WALL: Within normal limits.  BONES: Within normal limits.    IMPRESSION: Limited evaluation without intravenous contrast.    The cecum is in a superior position without evidence of volvulus this likely represents a cecal bascule. The descending colon and proximal sigmoid colon may be thickened versus underdistended. Limited evaluation without intravenous contrast. No bowel obstruction.    --- End of Report ---    JOS STALEY MD; Attending Radiologist  This document has been electronically signed. Apr 5 2025 9:53PM      REPEAT  CT   FINDINGS:  LOWER CHEST: Within normal limits.    LIVER: Within normal limits.  BILE DUCTS: Normal caliber.  GALLBLADDER:Within normal limits.  SPLEEN: Within normal limits.  PANCREAS: Within normal limits.  ADRENALS: Within normal limits.  KIDNEYS/URETERS: Within normal limits.    BLADDER: Within normal limits.  REPRODUCTIVE ORGANS: Fibroid uterus.    BOWEL: The cecumis now in normal position. The descending colon and   sigmoid colon are likely thickened and inflammatory changes are seen   surrounding the descending colon for example (3:81). This is better seen   on this exam when compared to the prior. Findings suspicious for colitis.   Correlate clinically. No bowel obstruction. Appendix is normal.  PERITONEUM/RETROPERITONEUM: Within normal limits.  VESSELS: Atherosclerotic changes.  LYMPH NODES: No lymphadenopathy.  ABDOMINAL WALL: Within normal limits.  BONES: Within normal limits.    IMPRESSION:    The cecum is now in normal position. The descending colon and sigmoid   colon are likely thickened and inflammatory changes are seen surrounding   the descending colon for example (3:81). This is better seen on this exam   when compared to the prior. Findings suspicious for colitis. Correlate   clinically.    --- End of Report ---    FRANKIE ADHIKARI MD; Resident Radiologist  This document has been electronically signed.  JOS STALEY MD; Attending Radiologist  This document has been electronically signed. Apr 6 2025  2:39AM

## 2025-04-06 NOTE — H&P ADULT - ASSESSMENT
75yo Female pt with PMH of diverticulitis, ischemic colitis (2016), PSx of adenoidectomy, hysteroscopy. Presents due to LLQ pain and BRBPR, CT in ED initially noted cecal bascule that later noted to have resolved with cecum in natural position, additionally, noted to have thickened descending colon raising concern for inflammatory colitis. Given elevated WBC and colitis picture on CT, favor infectious, althoug ischemic colitis cannot be ruled out given prior hx of ischemic colitis. Plan to admit the patient for further work up.     Admit patient to telemetry under Dr. Downs   NPO with meds   IVF at 75cc/hr   SCDs/IS/OOB  SQH  Pain management with PO Tylenol and Dilaudid PRN  Hold Estrogen-Progsterone  Hold ASA 81

## 2025-04-07 ENCOUNTER — TRANSCRIPTION ENCOUNTER (OUTPATIENT)
Age: 75
End: 2025-04-07

## 2025-04-07 LAB
ANION GAP SERPL CALC-SCNC: 16 MMOL/L — SIGNIFICANT CHANGE UP (ref 5–17)
BUN SERPL-MCNC: 11 MG/DL — SIGNIFICANT CHANGE UP (ref 7–23)
CALCIUM SERPL-MCNC: 8.5 MG/DL — SIGNIFICANT CHANGE UP (ref 8.4–10.5)
CHLORIDE SERPL-SCNC: 103 MMOL/L — SIGNIFICANT CHANGE UP (ref 96–108)
CO2 SERPL-SCNC: 22 MMOL/L — SIGNIFICANT CHANGE UP (ref 22–31)
CREAT SERPL-MCNC: 0.64 MG/DL — SIGNIFICANT CHANGE UP (ref 0.5–1.3)
EGFR: 93 ML/MIN/1.73M2 — SIGNIFICANT CHANGE UP
EGFR: 93 ML/MIN/1.73M2 — SIGNIFICANT CHANGE UP
GLUCOSE SERPL-MCNC: 83 MG/DL — SIGNIFICANT CHANGE UP (ref 70–99)
HCT VFR BLD CALC: 37.1 % — SIGNIFICANT CHANGE UP (ref 34.5–45)
HGB BLD-MCNC: 12.3 G/DL — SIGNIFICANT CHANGE UP (ref 11.5–15.5)
MAGNESIUM SERPL-MCNC: 2 MG/DL — SIGNIFICANT CHANGE UP (ref 1.6–2.6)
MCHC RBC-ENTMCNC: 30 PG — SIGNIFICANT CHANGE UP (ref 27–34)
MCHC RBC-ENTMCNC: 33.2 G/DL — SIGNIFICANT CHANGE UP (ref 32–36)
MCV RBC AUTO: 90.5 FL — SIGNIFICANT CHANGE UP (ref 80–100)
NRBC BLD AUTO-RTO: 0 /100 WBCS — SIGNIFICANT CHANGE UP (ref 0–0)
PHOSPHATE SERPL-MCNC: 2.7 MG/DL — SIGNIFICANT CHANGE UP (ref 2.5–4.5)
PLATELET # BLD AUTO: 199 K/UL — SIGNIFICANT CHANGE UP (ref 150–400)
POTASSIUM SERPL-MCNC: 3.3 MMOL/L — LOW (ref 3.5–5.3)
POTASSIUM SERPL-SCNC: 3.3 MMOL/L — LOW (ref 3.5–5.3)
RBC # BLD: 4.1 M/UL — SIGNIFICANT CHANGE UP (ref 3.8–5.2)
RBC # FLD: 13.6 % — SIGNIFICANT CHANGE UP (ref 10.3–14.5)
SODIUM SERPL-SCNC: 141 MMOL/L — SIGNIFICANT CHANGE UP (ref 135–145)
WBC # BLD: 13.42 K/UL — HIGH (ref 3.8–10.5)
WBC # FLD AUTO: 13.42 K/UL — HIGH (ref 3.8–10.5)

## 2025-04-07 PROCEDURE — 99233 SBSQ HOSP IP/OBS HIGH 50: CPT

## 2025-04-07 PROCEDURE — 99223 1ST HOSP IP/OBS HIGH 75: CPT | Mod: GC

## 2025-04-07 RX ORDER — ACETAMINOPHEN 500 MG/5ML
1000 LIQUID (ML) ORAL EVERY 6 HOURS
Refills: 0 | Status: DISCONTINUED | OUTPATIENT
Start: 2025-04-07 | End: 2025-04-11

## 2025-04-07 RX ORDER — POTASSIUM CHLORIDE, DEXTROSE MONOHYDRATE AND SODIUM CHLORIDE 150; 5; 900 MG/100ML; G/100ML; MG/100ML
1000 INJECTION, SOLUTION INTRAVENOUS
Refills: 0 | Status: DISCONTINUED | OUTPATIENT
Start: 2025-04-07 | End: 2025-04-08

## 2025-04-07 RX ORDER — ALPRAZOLAM 0.5 MG
0.25 TABLET, EXTENDED RELEASE 24 HR ORAL AT BEDTIME
Refills: 0 | Status: DISCONTINUED | OUTPATIENT
Start: 2025-04-07 | End: 2025-04-11

## 2025-04-07 RX ORDER — MELATONIN 5 MG
5 TABLET ORAL ONCE
Refills: 0 | Status: COMPLETED | OUTPATIENT
Start: 2025-04-07 | End: 2025-04-07

## 2025-04-07 RX ADMIN — Medication 4 MILLIGRAM(S): at 15:36

## 2025-04-07 RX ADMIN — SODIUM CHLORIDE 90 MILLILITER(S): 9 INJECTION, SOLUTION INTRAVENOUS at 13:23

## 2025-04-07 RX ADMIN — Medication 100 MILLIEQUIVALENT(S): at 07:07

## 2025-04-07 RX ADMIN — CEFTRIAXONE 100 MILLIGRAM(S): 500 INJECTION, POWDER, FOR SOLUTION INTRAMUSCULAR; INTRAVENOUS at 08:41

## 2025-04-07 RX ADMIN — POTASSIUM CHLORIDE, DEXTROSE MONOHYDRATE AND SODIUM CHLORIDE 90 MILLILITER(S): 150; 5; 900 INJECTION, SOLUTION INTRAVENOUS at 18:44

## 2025-04-07 RX ADMIN — Medication 20 MILLIGRAM(S): at 11:09

## 2025-04-07 RX ADMIN — Medication 100 MILLIGRAM(S): at 05:34

## 2025-04-07 RX ADMIN — Medication 100 MILLIGRAM(S): at 13:23

## 2025-04-07 RX ADMIN — Medication 100 MILLIGRAM(S): at 21:44

## 2025-04-07 RX ADMIN — Medication 100 MILLIEQUIVALENT(S): at 08:42

## 2025-04-07 NOTE — CONSULT NOTE ADULT - TIME BILLING
Time spent includes direct patient care  (interview and examination of patient), discussion with other providers, support staff and/or patient's family members, review of medical records, ordering diagnostic tests and analyzing results, and documentation.
Review of hospital course, labs, vitals, medical records.   Bedside exam and interview   Discussed plan of care with primary team ACP and housestaff   Documenting the encounter  Excludes teaching time and/or separately reported services

## 2025-04-07 NOTE — PROGRESS NOTE ADULT - ASSESSMENT
73yo Female pt with PMH of diverticulitis, ischemic colitis (2016), PSx of adenoidectomy, hysteroscopy. Presents due to LLQ pain and BRBPR, CT in ED initially noted cecal bascule that later noted to have resolved with cecum in natural position, additionally, noted to have thickened descending colon raising concern for inflammatory colitis. Given elevated WBC and colitis picture on CT, favor infectious, althoug ischemic colitis cannot be ruled out given prior hx of ischemic colitis. Plan to admit the patient for further work up.     NPO with meds/ IVF at 75cc/hr   CTX/flagyl  SCDs/IS/OOB/SQH  Pain management with PO Tylenol and Dilaudid PRN  AM labs   73yo Female pt with PMH of diverticulitis, ischemic colitis (2016), PSx of adenoidectomy, hysteroscopy. Presents due to LLQ pain and BRBPR, CT in ED initially noted cecal bascule that later noted to have resolved with cecum in natural position, additionally, noted to have thickened descending colon raising concern for inflammatory colitis. Given elevated WBC and colitis picture on CT, favor infectious, althoug ischemic colitis cannot be ruled out given prior hx of ischemic colitis. Plan to admit the patient for further work up.     NPO with meds/ IVF at 75cc/hr   CTX/flagyl  SCDs/IS/OOB/SQH  Pain management with PO Tylenol and Dilaudid PRN  Collateral per [facility] records  AM labs

## 2025-04-07 NOTE — PROGRESS NOTE ADULT - ASSESSMENT
Ms. Alie Houser is a 74/F history of diverticulitis, ischemic colitis (2016), history of adenoidectomy, hysteroscopy presenting with LLQ pain and BRBPR with CT findings of thickened descending colon admitted for management and further work up of colitis.     Recommendations:    #Likely ischemic colitis  - Management per Surgery  - provide adequate analgesia, bowel regimen, mobilize with fall precautions, incentive spirometry, DVT prophylaxis  - GI consulted and recommendations noted  - diet advancement per surgery and GI  - on Ceftriaxone and Flagyl  - C diff negative and GI PCR negative  - monitor CBC    #Hypokalemia  - replace and monitor  - can send Mg level and replace if needed    DVT ppx SCDs, initiation of chemoprophylaxis once deemed safe by surgery and GI    Feel free to reach out for any questions. Recommendations discussed with primary team.

## 2025-04-07 NOTE — PROGRESS NOTE ADULT - ATTENDING COMMENTS
Taking over for Dr. Downs.  Patient seen and examined, imaging reviewed.    Main complaint now is lack of sleep due to neighbor.  Abdominal pain (which was minimal) has resolved.  Still having BPR, but less than before.  AFVSS  NAD  Abd soft, minimal TTP LLQ without guarding. ND    WBC 13k    CTAP demonstrates thickening of left colon and sigmoid.  Bascule on 1st CT resolved    A/P: Ischemic colitis, improving.  Incidental cecal bascule.  1. NPO, IVF  2. Ceftriaxone/flagyl  3. Had similar episode in 2014, resolved.  4. Discussed ischemic colitis, natural history  5. d/c telemetry  6. May shower.

## 2025-04-07 NOTE — PROGRESS NOTE ADULT - SUBJECTIVE AND OBJECTIVE BOX
SUBJECTIVE:      MEDICATIONS  (STANDING):  acetaminophen     Tablet .. 1000 milliGRAM(s) Oral every 6 hours  cefTRIAXone   IVPB 1000 milliGRAM(s) IV Intermittent every 24 hours  lactated ringers. 1000 milliLiter(s) (90 mL/Hr) IV Continuous <Continuous>  metroNIDAZOLE  IVPB 500 milliGRAM(s) IV Intermittent every 8 hours  pantoprazole  Injectable 20 milliGRAM(s) IV Push daily  potassium chloride  10 mEq/100 mL IVPB 10 milliEquivalent(s) IV Intermittent every 1 hour    MEDICATIONS  (PRN):  HYDROmorphone  Injectable 0.5 milliGRAM(s) IV Push every 3 hours PRN Severe Pain (7 - 10)  ondansetron Injectable 4 milliGRAM(s) IV Push every 6 hours PRN Nausea      Vital Signs Last 24 Hrs  T(C): 36.9 (07 Apr 2025 05:00), Max: 37.2 (06 Apr 2025 08:30)  T(F): 98.4 (07 Apr 2025 05:00), Max: 98.9 (06 Apr 2025 08:30)  HR: 89 (07 Apr 2025 05:00) (77 - 98)  BP: 140/63 (07 Apr 2025 05:00) (132/60 - 156/68)  BP(mean): --  RR: 17 (07 Apr 2025 05:00) (16 - 19)  SpO2: 97% (07 Apr 2025 05:00) (95% - 98%)    Parameters below as of 07 Apr 2025 05:00  Patient On (Oxygen Delivery Method): room air        PHYSICAL EXAM  General: AAOx3, NAD, laying comfortably in bed  Cardio: normotensive; normal HR   Pulm: Nonlabored breathing  Abdomen: soft; non-distended; mildly tender in the LLQ, no rebound or guarding  Extremities: WWP, peripheral pulses appreciated      I&O's Detail    06 Apr 2025 07:01  -  07 Apr 2025 06:13  --------------------------------------------------------  IN:    IV PiggyBack: 50 mL    IV PiggyBack: 100 mL    Lactated Ringers: 2025 mL  Total IN: 2175 mL    OUT:  Total OUT: 0 mL    Total NET: 2175 mL          LABS:                        12.3   13.42 )-----------( 199      ( 07 Apr 2025 05:33 )             37.1     04-07    141  |  103  |  11  ----------------------------<  83  3.3[L]   |  22  |  0.64    Ca    8.5      07 Apr 2025 05:33  Phos  2.7     04-07  Mg     2.0     04-07    TPro  7.3  /  Alb  4.5  /  TBili  0.7  /  DBili  0.2  /  AST  30  /  ALT  22  /  AlkPhos  83  04-05      Urinalysis Basic - ( 07 Apr 2025 05:33 )    Color: x / Appearance: x / SG: x / pH: x  Gluc: 83 mg/dL / Ketone: x  / Bili: x / Urobili: x   Blood: x / Protein: x / Nitrite: x   Leuk Esterase: x / RBC: x / WBC x   Sq Epi: x / Non Sq Epi: x / Bacteria: x        RADIOLOGY & ADDITIONAL STUDIES: SUBJECTIVE: Seen at bedside. Decreased freq of BMs, some BRBPR. Pain improved, no nausea vomiting.      MEDICATIONS  (STANDING):  acetaminophen     Tablet .. 1000 milliGRAM(s) Oral every 6 hours  cefTRIAXone   IVPB 1000 milliGRAM(s) IV Intermittent every 24 hours  lactated ringers. 1000 milliLiter(s) (90 mL/Hr) IV Continuous <Continuous>  metroNIDAZOLE  IVPB 500 milliGRAM(s) IV Intermittent every 8 hours  pantoprazole  Injectable 20 milliGRAM(s) IV Push daily  potassium chloride  10 mEq/100 mL IVPB 10 milliEquivalent(s) IV Intermittent every 1 hour    MEDICATIONS  (PRN):  HYDROmorphone  Injectable 0.5 milliGRAM(s) IV Push every 3 hours PRN Severe Pain (7 - 10)  ondansetron Injectable 4 milliGRAM(s) IV Push every 6 hours PRN Nausea      Vital Signs Last 24 Hrs  T(C): 36.9 (07 Apr 2025 05:00), Max: 37.2 (06 Apr 2025 08:30)  T(F): 98.4 (07 Apr 2025 05:00), Max: 98.9 (06 Apr 2025 08:30)  HR: 89 (07 Apr 2025 05:00) (77 - 98)  BP: 140/63 (07 Apr 2025 05:00) (132/60 - 156/68)  BP(mean): --  RR: 17 (07 Apr 2025 05:00) (16 - 19)  SpO2: 97% (07 Apr 2025 05:00) (95% - 98%)    Parameters below as of 07 Apr 2025 05:00  Patient On (Oxygen Delivery Method): room air        PHYSICAL EXAM  General: AAOx3, NAD, laying comfortably in bed  Cardio: normotensive; normal HR   Pulm: Nonlabored breathing  Abdomen: soft; non-distended; mildly tender in the LLQ, no rebound or guarding  Extremities: WWP, peripheral pulses appreciated      I&O's Detail    06 Apr 2025 07:01  -  07 Apr 2025 06:13  --------------------------------------------------------  IN:    IV PiggyBack: 50 mL    IV PiggyBack: 100 mL    Lactated Ringers: 2025 mL  Total IN: 2175 mL    OUT:  Total OUT: 0 mL    Total NET: 2175 mL          LABS:                        12.3   13.42 )-----------( 199      ( 07 Apr 2025 05:33 )             37.1     04-07    141  |  103  |  11  ----------------------------<  83  3.3[L]   |  22  |  0.64    Ca    8.5      07 Apr 2025 05:33  Phos  2.7     04-07  Mg     2.0     04-07    TPro  7.3  /  Alb  4.5  /  TBili  0.7  /  DBili  0.2  /  AST  30  /  ALT  22  /  AlkPhos  83  04-05      Urinalysis Basic - ( 07 Apr 2025 05:33 )    Color: x / Appearance: x / SG: x / pH: x  Gluc: 83 mg/dL / Ketone: x  / Bili: x / Urobili: x   Blood: x / Protein: x / Nitrite: x   Leuk Esterase: x / RBC: x / WBC x   Sq Epi: x / Non Sq Epi: x / Bacteria: x        RADIOLOGY & ADDITIONAL STUDIES:

## 2025-04-07 NOTE — CONSULT NOTE ADULT - ATTENDING COMMENTS
Patient seen, examined, and discussed with Antonio Felix MS4. Agree with above. 74F with a h/o diverticulitis and ischemic colitis, presenting with acute focal LLQ pain with multiple episodes of bloody diarrhea similar to last hospitalization for ischemic colitis. Presentation consistent with ischemic colitis. Can slowly start clear liquids tonight from our standpoint. Would complete 7 days of abx.     Betito Romero MD  Gastroenterology

## 2025-04-07 NOTE — CONSULT NOTE ADULT - ASSESSMENT
Patient is a 73yo F with a PMH of diverticulitis and ischemic colitis, presenting with acute focal LLQ pain with multiple episodes of bloody diarrhea similar to last hospitalization for ischemic colitis. Patient was found to have bowel wall thickening in descending and sigmoid colon with focal rebound tenderness in LLQ on exam, negative C Diff GDH antigen. GI PCR, and elevated WBC with no recent travel, new foods, or known sick contacts, pointing away from infectious colitis. Inflammatory colitis less likely given lack of chronic symptoms, later age at first presentation, and per patient no findings suggestive of IBD in extensive outpatient workup, although chart currently does not have outpatient documentation to confirm. Given acute focal presentation and similarity to prior presentation, symptoms likely due to ischemic colitis.       - Patient has been NPO for >48hrs. Can start clears and advance diet as tolerated  - Complete IV ceftriaxone/metro course  - IVF as needed  - Pain management per primary team    
73yo Female pt with PMH of diverticulitis, ischemic colitis (2016), PSx of adenoidectomy, hysteroscopy presentes w/ LLQ pain and BRBPR w/ CT findings of thickened descending colon admitted for management and further work up of colitis.     #Colitis   - presents w/ BRBPR - HD stable at this time, no tachycardia or hypotension. Hgb stable. Appears well on exam. Given leukocytosis there is c/f infectious vs. inflammatory colitis. GI PCR negative. Ischemic on ddx but less likely given mild exam findings and normal lactate after IVF  - Abx per primary team   - Bowel rest, agree w/ IV hydration   - Pain management   - Hold ASA   - PPI     DVT ppx: heparin subq

## 2025-04-07 NOTE — CONSULT NOTE ADULT - SUBJECTIVE AND OBJECTIVE BOX
GASTROENTEROLOGY CONSULT NOTE    HPI:  75yo Female pt with PMH of diverticulitis, ischemic colitis (2016), PSx of adenoidectomy, hysteroscopy, presents after LLQ pain and diarrhea with BRBPR on 4/5. Patient reports waking at 10am with LLQ pain and developed ~4x episodes of diarrhea with increasing quantities of BRB. She also reported 5-6 loose stools in the ID with few small fresh blood specs in the toilet bowl. Last meal was 10pm the night before. Pt feels the pain is similar to pain from prior ischemic colitis episode 10 years ago. At baseline, patient denies abdominal pain with or after meals. Patient is a former smoker, quit 20 years ago, states she takes ~10 nicorette gums per day. She denies nausea, vomiting, fevers or chills, recent travel or new foods, sick contacts, chest pain, palpitations, and pain with or changes in urination. Patient reports extensive outpatient workup since initial ischemic colitis diagnosis, currently being followed by Dr. Luis Adams ([facility]).      PMH: Diverticulitis; Ischemic colitis   PSHx: adenoidectomy; hystereoscopy   Medications: 0.5mg Estrogen daily and 100mg Progesterone - every 3rd day (for post menopausal); ASA 81 q3 days, Acidophilus supplement  Allergies: Sulfa, betadine, IV contrast allergy (hives)  Social Hx: quit smoking >20 years ago currently taking ~10 nicorette gums/day, No alcohol use  Family Hx: Denies family hx of IBS, IBD. Father had diverticulitis. Patient states he had colon surgery and was found to have malignancy but was not treated beyond surgery. Prostate cancer (Father), Pancreatic cancer (Grandfather)  Last colonoscopy: 2022 and normal  Last EGD: 2023 showing irritation from reflux        T(C): 36.5 (04-05-25 @ 22:05), Max: 36.6 (04-05-25 @ 19:14)  HR: 103 (04-05-25 @ 22:05) (89 - 105)  BP: 180/77 (04-05-25 @ 22:05) (157/83 - 180/77)  RR: 18 (04-05-25 @ 22:05) (17 - 18)  SpO2: 99% (04-05-25 @ 22:05) (98% - 99%)        General: AAOx3, NAD, laying comfortably in bed  Cardio: S1,S2, normotensive; normal HR   Pulm: Nonlabored breathing  Abdomen: soft; non-distended; mildly tender in the LLQ, no rebound or guarding  Extremities: WWP, peripheral pulses appreciated          LABS:                        14.9   16.34 )-----------( 242      ( 05 Apr 2025 19:37 )             44.6     04-05    139  |  101  |  20  ----------------------------<  127[H]  4.0   |  22  |  0.74    Ca    9.6      05 Apr 2025 19:37    TPro  7.3  /  Alb  4.5  /  TBili  0.7  /  DBili  0.2  /  AST  30  /  ALT  22  /  AlkPhos  83  04-05      IMAGING     FINDINGS:  LOWER CHEST: Within normal limits.    LIVER: Within normal limits.  BILE DUCTS: Normal caliber.  GALLBLADDER: Within normal limits.  SPLEEN: Within normal limits.  PANCREAS: Within normal limits.  ADRENALS: Within normal limits.  KIDNEYS/URETERS: Within normal limits.    BLADDER: Within normal limits.  REPRODUCTIVE ORGANS: Enlarged myomatous uterus.    BOWEL: The cecum is in a superior position without evidence of volvulus this likely represents a cecal bascule. The descending colon and proximal sigmoid colon may be thickened versus underdistended. Limited evaluation without intravenous contrast. No bowel obstruction. Appendix is normal.  PERITONEUM/RETROPERITONEUM: Within normal limits.  VESSELS: Atherosclerotic changes.  LYMPH NODES: No lymphadenopathy.  ABDOMINAL WALL: Within normal limits.  BONES: Within normal limits.    IMPRESSION: Limited evaluation without intravenous contrast.    The cecum is in a superior position without evidence of volvulus this likely represents a cecal bascule. The descending colon and proximal sigmoid colon may be thickened versus underdistended. Limited evaluation without intravenous contrast. No bowel obstruction.    --- End of Report ---    JOS STALEY MD; Attending Radiologist  This document has been electronically signed. Apr 5 2025 9:53PM      REPEAT  CT   FINDINGS:  LOWER CHEST: Within normal limits.    LIVER: Within normal limits.  BILE DUCTS: Normal caliber.  GALLBLADDER:Within normal limits.  SPLEEN: Within normal limits.  PANCREAS: Within normal limits.  ADRENALS: Within normal limits.  KIDNEYS/URETERS: Within normal limits.    BLADDER: Within normal limits.  REPRODUCTIVE ORGANS: Fibroid uterus.    BOWEL: The cecumis now in normal position. The descending colon and   sigmoid colon are likely thickened and inflammatory changes are seen   surrounding the descending colon for example (3:81). This is better seen   on this exam when compared to the prior. Findings suspicious for colitis.   Correlate clinically. No bowel obstruction. Appendix is normal.  PERITONEUM/RETROPERITONEUM: Within normal limits.  VESSELS: Atherosclerotic changes.  LYMPH NODES: No lymphadenopathy.  ABDOMINAL WALL: Within normal limits.  BONES: Within normal limits.    IMPRESSION:    The cecum is now in normal position. The descending colon and sigmoid   colon are likely thickened and inflammatory changes are seen surrounding   the descending colon for example (3:81). This is better seen on this exam   when compared to the prior. Findings suspicious for colitis. Correlate   clinically.    --- End of Report ---    FRANKIE ADHIKARI MD; Resident Radiologist  This document has been electronically signed.  JOS STALEY MD; Attending Radiologist  This document has been electronically signed. Apr 6 2025  2:39AM           (06 Apr 2025 03:27)    Allergies    IV Contrast (Other)  penicillins (Flushing)  sulfa drugs (Swelling)    Intolerances      Home Medications:  aspirin 81 mg oral tablet: orally every 3 days (06 Apr 2025 03:30)  Estrace 0.5 mg oral tablet: 1 tab(s) orally once a day (06 Apr 2025 03:30)  pantoprazole 20 mg oral delayed release tablet: 1 tab(s) orally once a day (06 Apr 2025 05:06)  progesterone 100 mg oral capsule: 1 cap(s) orally every 3 days (06 Apr 2025 03:30)    MEDICATIONS:  MEDICATIONS  (STANDING):  acetaminophen     Tablet .. 1000 milliGRAM(s) Oral every 6 hours  cefTRIAXone   IVPB 1000 milliGRAM(s) IV Intermittent every 24 hours  lactated ringers. 1000 milliLiter(s) (90 mL/Hr) IV Continuous <Continuous>  metroNIDAZOLE  IVPB 500 milliGRAM(s) IV Intermittent every 8 hours  pantoprazole  Injectable 20 milliGRAM(s) IV Push daily    MEDICATIONS  (PRN):  ALPRAZolam 0.25 milliGRAM(s) Oral at bedtime PRN Sleep aid  HYDROmorphone  Injectable 0.5 milliGRAM(s) IV Push every 3 hours PRN Severe Pain (7 - 10)  ondansetron Injectable 4 milliGRAM(s) IV Push every 6 hours PRN Nausea    PAST MEDICAL & SURGICAL HISTORY:    FAMILY HISTORY:    SOCIAL HISTORY:  [place]: [ ] Current, [ ] Former, [ ] Never; Pack Years:  Alcohol:  Illicit Drugs:    REVIEW OF SYSTEMS:  CONSTITUTIONAL: No weakness, fevers or chills  HEENT: No visual changes; No vertigo or throat pain   NECK: No pain or stiffness  RESPIRATORY: No cough, wheezing, hemoptysis; No shortness of breath  CARDIOVASCULAR: No chest pain or palpitations  GASTROINTESTINAL: No abdominal or epigastric pain. No nausea, vomiting, or hematemesis; No diarrhea or constipation. No melena or hematochezia.  GENITOURINARY: No dysuria, frequency or hematuria  NEUROLOGICAL: No numbness or weakness  SKIN: No itching, burning, rashes, or lesions   All other 10 review of systems is negative unless indicated above.    Vital Signs Last 24 Hrs  T(C): 36.7 (07 Apr 2025 08:40), Max: 37.1 (06 Apr 2025 20:45)  T(F): 98 (07 Apr 2025 08:40), Max: 98.8 (06 Apr 2025 20:45)  HR: 88 (07 Apr 2025 08:40) (79 - 98)  BP: 152/74 (07 Apr 2025 08:40) (132/60 - 156/68)  BP(mean): --  RR: 18 (07 Apr 2025 08:40) (16 - 19)  SpO2: 99% (07 Apr 2025 08:40) (96% - 99%)    Parameters below as of 07 Apr 2025 08:40  Patient On (Oxygen Delivery Method): room air        04-06 @ 07:01 - 04-07 @ 07:00  --------------------------------------------------------  IN: 2275 mL / OUT: 0 mL / NET: 2275 mL    04-07 @ 07:01 - 04-07 @ 10:30  --------------------------------------------------------  IN: 330 mL / OUT: 0 mL / NET: 330 mL        PHYSICAL EXAM:    General: Well developed; well nourished; in no acute distress  Eyes: Anicteric sclerae, moist conjunctivae  HENT: Moist mucous membranes  Neck: Trachea midline, supple  Lungs: Normal respiratory effort, no intercostal retractions  Abdomen: Soft, non-distended, no lesions, symmetric; Focal rebound tenderness at LLQ.   Extremities: Normal range of motion, No clubbing, cyanosis or edema  Neurological: Alert and oriented x3  Skin: Warm and dry. No obvious rash    LABS:                        12.3   13.42 )-----------( 199      ( 07 Apr 2025 05:33 )             37.1     04-07    141  |  103  |  11  ----------------------------<  83  3.3[L]   |  22  |  0.64    Ca    8.5      07 Apr 2025 05:33  Phos  2.7     04-07  Mg     2.0     04-07    TPro  7.3  /  Alb  4.5  /  TBili  0.7  /  DBili  0.2  /  AST  30  /  ALT  22  /  AlkPhos  83  04-05            RADIOLOGY & ADDITIONAL STUDIES:     Reviewed

## 2025-04-07 NOTE — CHART NOTE - NSCHARTNOTEFT_GEN_A_CORE
Serial Abdominal Exam    SUBJECTIVE: Pt seen and examined at bedside. Reports that her pain has largely resolved. Had a bloody bowel movement very recently as well as one at 3pm. Denies any dizziness or lightheadedness. Denies nausea or vomiting.         Objective   MEDICATIONS  (STANDING):  cefTRIAXone   IVPB 1000 milliGRAM(s) IV Intermittent every 24 hours  dextrose 5% + sodium chloride 0.45% with potassium chloride 20 mEq/L 1000 milliLiter(s) (90 mL/Hr) IV Continuous <Continuous>  metroNIDAZOLE  IVPB 500 milliGRAM(s) IV Intermittent every 8 hours  pantoprazole  Injectable 20 milliGRAM(s) IV Push daily    MEDICATIONS  (PRN):  acetaminophen     Tablet .. 1000 milliGRAM(s) Oral every 6 hours PRN Mild Pain (1 - 3)  ALPRAZolam 0.25 milliGRAM(s) Oral at bedtime PRN Sleep aid  ondansetron Injectable 4 milliGRAM(s) IV Push every 6 hours PRN Nausea      Vital Signs Last 24 Hrs  T(C): 37 (07 Apr 2025 16:17), Max: 37.1 (07 Apr 2025 00:21)  T(F): 98.6 (07 Apr 2025 16:17), Max: 98.7 (07 Apr 2025 00:21)  HR: 96 (07 Apr 2025 16:17) (86 - 96)  BP: 153/71 (07 Apr 2025 16:17) (135/87 - 170/74)  BP(mean): --  RR: 17 (07 Apr 2025 16:17) (17 - 18)  SpO2: 97% (07 Apr 2025 16:17) (97% - 99%)    Parameters below as of 07 Apr 2025 16:17  Patient On (Oxygen Delivery Method): room air        Physical Exam:  General: NAD, resting comfortably in bed  Pulmonary: Nonlabored breathing, no respiratory distress  Cardiovascular: NSR  Abdominal: soft, nondistended. Minimal LLQ tenderness to palpation.   Extremities: WWP, normal strength  Neuro: A/O x 3  Pulses: palpable distal pulses    I&O's Summary    06 Apr 2025 07:01  -  07 Apr 2025 07:00  --------------------------------------------------------  IN: 2275 mL / OUT: 0 mL / NET: 2275 mL    07 Apr 2025 07:01  -  08 Apr 2025 00:15  --------------------------------------------------------  IN: 1150 mL / OUT: 550 mL / NET: 600 mL        LABS:                        12.3   13.42 )-----------( 199      ( 07 Apr 2025 05:33 )             37.1     04-07    141  |  103  |  11  ----------------------------<  83  3.3[L]   |  22  |  0.64    Ca    8.5      07 Apr 2025 05:33  Phos  2.7     04-07  Mg     2.0     04-07        Urinalysis Basic - ( 07 Apr 2025 05:33 )    Color: x / Appearance: x / SG: x / pH: x  Gluc: 83 mg/dL / Ketone: x  / Bili: x / Urobili: x   Blood: x / Protein: x / Nitrite: x   Leuk Esterase: x / RBC: x / WBC x   Sq Epi: x / Non Sq Epi: x / Bacteria: x      Will continue to monitor. VERA stable.

## 2025-04-07 NOTE — PROGRESS NOTE ADULT - SUBJECTIVE AND OBJECTIVE BOX
Patient is a 74y old  Female who presents with a chief complaint of Colitis (07 Apr 2025 10:20).    Patient seen and examined today. She reports feeling better. She moved her bowels this morning with much less blood than before. She denies vomiting, fever. She gets abdominal pain when about to move her bowels.     Allergies    IV Contrast (Other)  penicillins (Flushing)  sulfa drugs (Swelling)    Last Bowel Movement: 07-Apr-2025 (04-07-25 @ 08:40)    MEDICATIONS  (STANDING):  acetaminophen     Tablet .. 1000 milliGRAM(s) Oral every 6 hours  cefTRIAXone   IVPB 1000 milliGRAM(s) IV Intermittent every 24 hours  lactated ringers. 1000 milliLiter(s) (90 mL/Hr) IV Continuous <Continuous>  metroNIDAZOLE  IVPB 500 milliGRAM(s) IV Intermittent every 8 hours  pantoprazole  Injectable 20 milliGRAM(s) IV Push daily    MEDICATIONS  (PRN):  ALPRAZolam 0.25 milliGRAM(s) Oral at bedtime PRN Sleep aid  HYDROmorphone  Injectable 0.5 milliGRAM(s) IV Push every 3 hours PRN Severe Pain (7 - 10)  ondansetron Injectable 4 milliGRAM(s) IV Push every 6 hours PRN Nausea    Vital Signs Last 24 Hrs  T(C): 36.6 (04-07-25 @ 13:28), Max: 37.1 (04-06-25 @ 20:45)  T(F): 97.8 (04-07-25 @ 13:28), Max: 98.8 (04-06-25 @ 20:45)  HR: 86 (04-07-25 @ 11:18) (86 - 98)  BP: 135/87 (04-07-25 @ 14:00) (135/87 - 170/74)  BP(mean): --  RR: 18 (04-07-25 @ 13:28) (17 - 19)  SpO2: 97% (04-07-25 @ 13:28) (97% - 99%)    I&O's Summary    06 Apr 2025 07:01  -  07 Apr 2025 07:00  --------------------------------------------------------  IN: 2275 mL / OUT: 0 mL / NET: 2275 mL    07 Apr 2025 07:01  -  07 Apr 2025 14:39  --------------------------------------------------------  IN: 700 mL / OUT: 0 mL / NET: 700 mL      Oxygen Saturation Index= Unable to calculate   [Based on FiO2 = Unknown, SpO2 = 97(04/07/2025 13:28), MAP = Unknown]    PHYSICAL EXAM:  GENERAL: NAD  HEAD:  Atraumatic, Normocephalic  EYES: EOMI, conjunctiva and sclera clear  ENMT: Moist mucous membranes  NECK: Supple, No JVD  NERVOUS SYSTEM:  Alert & Oriented X3, nonfocal exam  CHEST/LUNG: Clear to auscultation bilaterally  HEART: Regular rate and rhythm; Normal S1 and S2  ABDOMEN: Soft, left lower abdominal tenderness without guarding; Bowel sounds present  EXTREMITIES:  2+ Peripheral Pulses, No clubbing, cyanosis, or edema  PSYCH: Normal mood and affect    Investigations:                        12.3   13.42 )-----------( 199      ( 07 Apr 2025 05:33 )             37.1     04-07    141  |  103  |  11  ----------------------------<  83  3.3[L]   |  22  |  0.64    Ca    8.5      07 Apr 2025 05:33  Phos  2.7     04-07  Mg     2.0     04-07    TPro  7.3  /  Alb  4.5  /  TBili  0.7  /  DBili  0.2  /  AST  30  /  ALT  22  /  AlkPhos  83  04-05

## 2025-04-08 LAB
ANION GAP SERPL CALC-SCNC: 11 MMOL/L — SIGNIFICANT CHANGE UP (ref 5–17)
BUN SERPL-MCNC: 10 MG/DL — SIGNIFICANT CHANGE UP (ref 7–23)
CALCIUM SERPL-MCNC: 8.6 MG/DL — SIGNIFICANT CHANGE UP (ref 8.4–10.5)
CHLORIDE SERPL-SCNC: 105 MMOL/L — SIGNIFICANT CHANGE UP (ref 96–108)
CO2 SERPL-SCNC: 24 MMOL/L — SIGNIFICANT CHANGE UP (ref 22–31)
CREAT SERPL-MCNC: 0.71 MG/DL — SIGNIFICANT CHANGE UP (ref 0.5–1.3)
EGFR: 90 ML/MIN/1.73M2 — SIGNIFICANT CHANGE UP
EGFR: 90 ML/MIN/1.73M2 — SIGNIFICANT CHANGE UP
GLUCOSE SERPL-MCNC: 110 MG/DL — HIGH (ref 70–99)
HCT VFR BLD CALC: 38.7 % — SIGNIFICANT CHANGE UP (ref 34.5–45)
HGB BLD-MCNC: 12.6 G/DL — SIGNIFICANT CHANGE UP (ref 11.5–15.5)
MAGNESIUM SERPL-MCNC: 2 MG/DL — SIGNIFICANT CHANGE UP (ref 1.6–2.6)
MCHC RBC-ENTMCNC: 29.4 PG — SIGNIFICANT CHANGE UP (ref 27–34)
MCHC RBC-ENTMCNC: 32.6 G/DL — SIGNIFICANT CHANGE UP (ref 32–36)
MCV RBC AUTO: 90.4 FL — SIGNIFICANT CHANGE UP (ref 80–100)
NRBC BLD AUTO-RTO: 0 /100 WBCS — SIGNIFICANT CHANGE UP (ref 0–0)
PHOSPHATE SERPL-MCNC: 2.5 MG/DL — SIGNIFICANT CHANGE UP (ref 2.5–4.5)
PLATELET # BLD AUTO: 227 K/UL — SIGNIFICANT CHANGE UP (ref 150–400)
POTASSIUM SERPL-MCNC: 3.6 MMOL/L — SIGNIFICANT CHANGE UP (ref 3.5–5.3)
POTASSIUM SERPL-SCNC: 3.6 MMOL/L — SIGNIFICANT CHANGE UP (ref 3.5–5.3)
RBC # BLD: 4.28 M/UL — SIGNIFICANT CHANGE UP (ref 3.8–5.2)
RBC # FLD: 13.6 % — SIGNIFICANT CHANGE UP (ref 10.3–14.5)
SODIUM SERPL-SCNC: 140 MMOL/L — SIGNIFICANT CHANGE UP (ref 135–145)
WBC # BLD: 11.58 K/UL — HIGH (ref 3.8–10.5)
WBC # FLD AUTO: 11.58 K/UL — HIGH (ref 3.8–10.5)

## 2025-04-08 PROCEDURE — 99231 SBSQ HOSP IP/OBS SF/LOW 25: CPT

## 2025-04-08 PROCEDURE — 99232 SBSQ HOSP IP/OBS MODERATE 35: CPT | Mod: GC

## 2025-04-08 PROCEDURE — 99232 SBSQ HOSP IP/OBS MODERATE 35: CPT

## 2025-04-08 RX ADMIN — Medication 100 MILLIEQUIVALENT(S): at 08:37

## 2025-04-08 RX ADMIN — CEFTRIAXONE 100 MILLIGRAM(S): 500 INJECTION, POWDER, FOR SOLUTION INTRAMUSCULAR; INTRAVENOUS at 09:08

## 2025-04-08 RX ADMIN — Medication 100 MILLIGRAM(S): at 06:37

## 2025-04-08 RX ADMIN — Medication 100 MILLIGRAM(S): at 14:41

## 2025-04-08 RX ADMIN — Medication 100 MILLIEQUIVALENT(S): at 12:00

## 2025-04-08 RX ADMIN — POTASSIUM CHLORIDE, DEXTROSE MONOHYDRATE AND SODIUM CHLORIDE 90 MILLILITER(S): 150; 5; 900 INJECTION, SOLUTION INTRAVENOUS at 06:37

## 2025-04-08 RX ADMIN — Medication 20 MILLIGRAM(S): at 11:21

## 2025-04-08 RX ADMIN — Medication 100 MILLIGRAM(S): at 22:10

## 2025-04-08 NOTE — PROGRESS NOTE ADULT - SUBJECTIVE AND OBJECTIVE BOX
Patient is a 74y old  Female who presents with a chief complaint of Colitis (08 Apr 2025 12:32).    Patient seen and examined today. She reports that the bloody bowel movement is much less. She denies worsening abdominal pain, vomiting, fever, dyspnea, dizziness.     Allergies    IV Contrast (Other)  penicillins (Flushing)  sulfa drugs (Swelling)    Last Bowel Movement: 07-Apr-2025 (04-08-25 @ 08:40)    MEDICATIONS  (STANDING):  cefTRIAXone   IVPB 1000 milliGRAM(s) IV Intermittent every 24 hours  dextrose 5% + sodium chloride 0.45% with potassium chloride 20 mEq/L 1000 milliLiter(s) (90 mL/Hr) IV Continuous <Continuous>  metroNIDAZOLE  IVPB 500 milliGRAM(s) IV Intermittent every 8 hours  pantoprazole  Injectable 20 milliGRAM(s) IV Push daily    MEDICATIONS  (PRN):  acetaminophen     Tablet .. 1000 milliGRAM(s) Oral every 6 hours PRN Mild Pain (1 - 3)  ALPRAZolam 0.25 milliGRAM(s) Oral at bedtime PRN Sleep aid  ondansetron Injectable 4 milliGRAM(s) IV Push every 6 hours PRN Nausea    Vital Signs Last 24 Hrs  T(C): 36.5 (04-08-25 @ 12:35), Max: 37 (04-07-25 @ 16:17)  T(F): 97.7 (04-08-25 @ 12:35), Max: 98.6 (04-07-25 @ 16:17)  HR: 86 (04-08-25 @ 12:35) (76 - 96)  BP: 145/77 (04-08-25 @ 12:35) (113/55 - 170/74)  BP(mean): 98 (04-08-25 @ 04:59) (98 - 98)  RR: 18 (04-08-25 @ 12:35) (16 - 18)  SpO2: 97% (04-08-25 @ 12:35) (96% - 97%)    I&O's Summary    07 Apr 2025 07:01  -  08 Apr 2025 07:00  --------------------------------------------------------  IN: 2250 mL / OUT: 1000 mL / NET: 1250 mL    08 Apr 2025 07:01  -  08 Apr 2025 13:25  --------------------------------------------------------  IN: 840 mL / OUT: 700 mL / NET: 140 mL      Oxygen Saturation Index= Unable to calculate   [Based on FiO2 = Unknown, SpO2 = 97(04/08/2025 12:35), MAP = Unknown]    PHYSICAL EXAM:  GENERAL: NAD  HEAD:  Atraumatic, Normocephalic  EYES: EOMI, conjunctiva and sclera clear  ENMT: Moist mucous membranes  NECK: Supple, No JVD  NERVOUS SYSTEM:  Alert & Oriented X3, moves extremities  CHEST/LUNG: Clear to auscultation bilaterally  HEART: Regular rate and rhythm; Normal S1 and S2  ABDOMEN: Soft, less left lower abdominal tenderness without guarding; Bowel sounds present  EXTREMITIES:  2+ Peripheral Pulses, no edema  PSYCH: Normal mood and affect    Investigations:                        12.6   11.58 )-----------( 227      ( 08 Apr 2025 05:30 )             38.7     04-08    140  |  105  |  10  ----------------------------<  110[H]  3.6   |  24  |  0.71    Ca    8.6      08 Apr 2025 05:30  Phos  2.5     04-08  Mg     2.0     04-08       no

## 2025-04-08 NOTE — PROGRESS NOTE ADULT - ASSESSMENT
Patient is a 75yo F with a PMH of diverticulitis and ischemic colitis, presenting with acute focal LLQ pain with multiple episodes of bloody diarrhea similar to last hospitalization for ischemic colitis. Patient was found to have bowel wall thickening in descending and sigmoid colon with focal rebound tenderness in LLQ on exam, negative C Diff GDH antigen. GI PCR, and elevated WBC with no recent travel, new foods, or known sick contacts, pointing away from infectious colitis. Inflammatory colitis less likely given lack of chronic symptoms, later age at first presentation, and per patient no findings suggestive of IBD in extensive outpatient workup, although chart currently does not have outpatient documentation to confirm. Given acute focal presentation and similarity to prior presentation, symptoms likely due to ischemic colitis.       - Continue clears, advance diet as tolerated  - Complete IV ceftriaxone/metro course  - IVF as needed  - Pain management per primary team     Patient is a 73yo F with a PMH of diverticulitis and ischemic colitis, presenting with acute focal LLQ pain with multiple episodes of bloody diarrhea similar to last hospitalization for ischemic colitis. Patient was found to have bowel wall thickening in descending and sigmoid colon with focal rebound tenderness in LLQ on exam, negative C Diff GDH antigen. GI PCR, and elevated WBC with no recent travel, new foods, or known sick contacts, pointing away from infectious colitis. Inflammatory colitis less likely given lack of chronic symptoms, later age at first presentation, and per patient no findings suggestive of IBD in extensive outpatient workup, although chart currently does not have outpatient documentation to confirm. Given acute focal presentation and similarity to prior presentation, symptoms likely due to ischemic colitis.       - Continue clears, advance diet as tolerated  - Complete IV ceftriaxone/metro course  - IVF as needed  - Pain management per primary team    Case discussed with Attending: Dr. Romero. GI will sign off.

## 2025-04-08 NOTE — PROGRESS NOTE ADULT - ASSESSMENT
Ms. Alie Houser is a 74/F history of diverticulitis, ischemic colitis (2016), history of adenoidectomy, hysteroscopy presenting with LLQ pain and BRBPR with CT findings of thickened descending colon admitted for management and further work up of colitis.     Recommendations:    #Likely ischemic colitis  - Management per Surgery  - provide adequate analgesia, bowel regimen, mobilize with fall precautions, incentive spirometry, DVT prophylaxis  - GI consulted and recommendations noted  - diet advancement per surgery and GI  - on Ceftriaxone and Flagyl  - C diff negative and GI PCR negative  - monitor CBC; Hgb stable    #Hypokalemia, improved  - monitor and replace as needed  - Mg normal    DVT ppx SCDs    Feel free to reach out for any questions. Recommendations discussed with primary team.

## 2025-04-08 NOTE — PROGRESS NOTE ADULT - SUBJECTIVE AND OBJECTIVE BOX
SUBJECTIVE: Pt seen and examined by chief resident on AM rounds. Pt doing well, resting comfortably on bed. Reports having some blood on the toilet paper when she wiped but overall bleeding is much improved. No pain. No nausea or vomiting.   Vital Signs Last 24 Hrs  T(C): 36.3 (08 Apr 2025 04:59), Max: 37 (07 Apr 2025 16:17)  T(F): 97.3 (08 Apr 2025 04:59), Max: 98.6 (07 Apr 2025 16:17)  HR: 82 (08 Apr 2025 04:59) (82 - 96)  BP: 145/75 (08 Apr 2025 04:59) (135/87 - 170/74)  BP(mean): 98 (08 Apr 2025 04:59) (98 - 98)  RR: 17 (08 Apr 2025 04:59) (16 - 18)  SpO2: 96% (08 Apr 2025 04:59) (96% - 99%)    Parameters below as of 08 Apr 2025 04:59  Patient On (Oxygen Delivery Method): room air        I&O's Summary    07 Apr 2025 07:01  -  08 Apr 2025 07:00  --------------------------------------------------------  IN: 2250 mL / OUT: 1000 mL / NET: 1250 mL    08 Apr 2025 07:01  -  08 Apr 2025 07:17  --------------------------------------------------------  IN: 90 mL / OUT: 250 mL / NET: -160 mL        Physical Exam:  General Appearance: Appears well, NAD  Pulmonary: Nonlabored breathing, no respiratory distress  Abdomen: Soft, nondistended, nontender  Extremities: WWP, SCD's in place     LABS:                        12.6   11.58 )-----------( 227      ( 08 Apr 2025 05:30 )             38.7     04-08    140  |  105  |  10  ----------------------------<  110[H]  3.6   |  24  |  0.71    Ca    8.6      08 Apr 2025 05:30  Phos  2.5     04-08  Mg     2.0     04-08        Urinalysis Basic - ( 08 Apr 2025 05:30 )    Color: x / Appearance: x / SG: x / pH: x  Gluc: 110 mg/dL / Ketone: x  / Bili: x / Urobili: x   Blood: x / Protein: x / Nitrite: x   Leuk Esterase: x / RBC: x / WBC x   Sq Epi: x / Non Sq Epi: x / Bacteria: x

## 2025-04-08 NOTE — CHART NOTE - NSCHARTNOTEFT_GEN_A_CORE
SUBJECTIVE: x2 BMs today, 1st not visualized, per RN no apparent blood. 2nd visualized in bowl, appears normal brown with slight blood tinge around. Abdominal pain improved. No nausea/vomiting.      MEDICATIONS  (STANDING):  cefTRIAXone   IVPB 1000 milliGRAM(s) IV Intermittent every 24 hours  dextrose 5% + sodium chloride 0.45% with potassium chloride 20 mEq/L 1000 milliLiter(s) (90 mL/Hr) IV Continuous <Continuous>  metroNIDAZOLE  IVPB 500 milliGRAM(s) IV Intermittent every 8 hours  pantoprazole  Injectable 20 milliGRAM(s) IV Push daily    MEDICATIONS  (PRN):  acetaminophen     Tablet .. 1000 milliGRAM(s) Oral every 6 hours PRN Mild Pain (1 - 3)  ALPRAZolam 0.25 milliGRAM(s) Oral at bedtime PRN Sleep aid  ondansetron Injectable 4 milliGRAM(s) IV Push every 6 hours PRN Nausea      Vital Signs Last 24 Hrs  T(C): 36.5 (08 Apr 2025 12:35), Max: 37 (07 Apr 2025 16:17)  T(F): 97.7 (08 Apr 2025 12:35), Max: 98.6 (07 Apr 2025 16:17)  HR: 86 (08 Apr 2025 12:35) (76 - 96)  BP: 145/77 (08 Apr 2025 12:35) (113/55 - 153/71)  BP(mean): 98 (08 Apr 2025 04:59) (98 - 98)  RR: 18 (08 Apr 2025 12:35) (16 - 18)  SpO2: 97% (08 Apr 2025 12:35) (96% - 97%)    Parameters below as of 08 Apr 2025 12:35  Patient On (Oxygen Delivery Method): room air        PHYSICAL EXAM  General: NAD, resting comfortably  Pulmonary: normal resp effort  Cardiovascular: NSR  Abdominal: soft, ND/NT  Extremities: WWP, no clubbing/cyanosis/edema  Neuro: A/O x 3, no focal deficits    I&O's Detail    07 Apr 2025 07:01  -  08 Apr 2025 07:00  --------------------------------------------------------  IN:    dextrose 5% + sodium chloride 0.45% w/ Additives: 1350 mL    IV PiggyBack: 50 mL    IV PiggyBack: 100 mL    IV PiggyBack: 300 mL    Lactated Ringers: 450 mL  Total IN: 2250 mL    OUT:    Voided (mL): 1000 mL  Total OUT: 1000 mL    Total NET: 1250 mL      08 Apr 2025 07:01  -  08 Apr 2025 13:46  --------------------------------------------------------  IN:    dextrose 5% + sodium chloride 0.45% w/ Additives: 540 mL    Oral Fluid: 300 mL  Total IN: 840 mL    OUT:    Voided (mL): 700 mL  Total OUT: 700 mL    Total NET: 140 mL          LABS:                        12.6   11.58 )-----------( 227      ( 08 Apr 2025 05:30 )             38.7     04-08    140  |  105  |  10  ----------------------------<  110[H]  3.6   |  24  |  0.71    Ca    8.6      08 Apr 2025 05:30  Phos  2.5     04-08  Mg     2.0     04-08        Urinalysis Basic - ( 08 Apr 2025 05:30 )    Color: x / Appearance: x / SG: x / pH: x  Gluc: 110 mg/dL / Ketone: x  / Bili: x / Urobili: x   Blood: x / Protein: x / Nitrite: x   Leuk Esterase: x / RBC: x / WBC x   Sq Epi: x / Non Sq Epi: x / Bacteria: x      Assessment: Stable serial exam, bowel movements appear to have remained stable/improved. x2 total, 1 with very slight blood tinge, stool normal brown color.

## 2025-04-08 NOTE — PROGRESS NOTE ADULT - ATTENDING COMMENTS
Patient seen, examined, and discussed with Antonio Felix MS4. Agree with above. 74F with a h/o diverticulitis and ischemic colitis, presenting with acute focal LLQ pain with multiple episodes of bloody diarrhea similar to last hospitalization for ischemic colitis. Presentation consistent with ischemic colitis. Complete abx, diet per primary team. Outpatient follow up with primary GI. Will sign off, please call back with questions or concerns.     Betito Romero MD  Gastroenterology

## 2025-04-08 NOTE — PROGRESS NOTE ADULT - ASSESSMENT
75yo Female pt with PMH of diverticulitis, ischemic colitis (2016), PSx of adenoidectomy, hysteroscopy. Presents due to LLQ pain and BRBPR, CT in ED initially noted cecal bascule that later noted to have resolved with cecum in natural position, additionally, noted to have thickened descending colon raising concern for inflammatory colitis. Given elevated WBC and colitis picture on CT, favor infectious, although ischemic colitis cannot be ruled out given prior hx of ischemic colitis. Plan to admit the patient for further work up.     NPO with meds/ IVF at 75cc/hr   CTX/flagyl  SCDs/IS/OOB/SQH  Pain management with PO Tylenol and Dilaudid PRN  AM labs

## 2025-04-08 NOTE — CHART NOTE - NSCHARTNOTEFT_GEN_A_CORE
Serial Abdominal Exam    SUBJECTIVE: Pt seen and examined at bedside. Reports that her pain continues to improve. Reports that she has been having several bloody bowel movements today, most recently at 6PM, at 3PM, and several times before that. Reports that the frequency and urgency of the bowel movements is improving. Denies nausea or vomiting.         Objective   MEDICATIONS  (STANDING):  cefTRIAXone   IVPB 1000 milliGRAM(s) IV Intermittent every 24 hours  dextrose 5% + sodium chloride 0.45% with potassium chloride 20 mEq/L 1000 milliLiter(s) (90 mL/Hr) IV Continuous <Continuous>  metroNIDAZOLE  IVPB 500 milliGRAM(s) IV Intermittent every 8 hours  pantoprazole  Injectable 20 milliGRAM(s) IV Push daily    MEDICATIONS  (PRN):  acetaminophen     Tablet .. 1000 milliGRAM(s) Oral every 6 hours PRN Mild Pain (1 - 3)  ALPRAZolam 0.25 milliGRAM(s) Oral at bedtime PRN Sleep aid  ondansetron Injectable 4 milliGRAM(s) IV Push every 6 hours PRN Nausea      Vital Signs Last 24 Hrs  T(C): 37 (07 Apr 2025 16:17), Max: 37.1 (07 Apr 2025 00:21)  T(F): 98.6 (07 Apr 2025 16:17), Max: 98.7 (07 Apr 2025 00:21)  HR: 96 (07 Apr 2025 16:17) (86 - 96)  BP: 153/71 (07 Apr 2025 16:17) (135/87 - 170/74)  BP(mean): --  RR: 17 (07 Apr 2025 16:17) (17 - 18)  SpO2: 97% (07 Apr 2025 16:17) (97% - 99%)    Parameters below as of 07 Apr 2025 16:17  Patient On (Oxygen Delivery Method): room air        Physical Exam:  General: NAD, resting comfortably in bed  Pulmonary: Nonlabored breathing, no respiratory distress  Cardiovascular: NSR  Abdominal: soft, Nondistended, minimal LLQ tenderness to palpation.   Extremities: WWP, normal strength  Neuro: A/O x 3  Pulses: palpable distal pulses    I&O's Summary    06 Apr 2025 07:01  -  07 Apr 2025 07:00  --------------------------------------------------------  IN: 2275 mL / OUT: 0 mL / NET: 2275 mL    07 Apr 2025 07:01  -  08 Apr 2025 00:11  --------------------------------------------------------  IN: 1150 mL / OUT: 550 mL / NET: 600 mL        LABS:                        12.3   13.42 )-----------( 199      ( 07 Apr 2025 05:33 )             37.1     04-07    141  |  103  |  11  ----------------------------<  83  3.3[L]   |  22  |  0.64    Ca    8.5      07 Apr 2025 05:33  Phos  2.7     04-07  Mg     2.0     04-07        Urinalysis Basic - ( 07 Apr 2025 05:33 )    Color: x / Appearance: x / SG: x / pH: x  Gluc: 83 mg/dL / Ketone: x  / Bili: x / Urobili: x   Blood: x / Protein: x / Nitrite: x   Leuk Esterase: x / RBC: x / WBC x   Sq Epi: x / Non Sq Epi: x / Bacteria: x      CAPILLARY BLOOD GLUCOSE            Will continue to monitor. Frequency of bloody BMs improving. LLQ pain improving as well.

## 2025-04-08 NOTE — PROGRESS NOTE ADULT - SUBJECTIVE AND OBJECTIVE BOX
GASTROENTEROLOGY PROGRESS NOTE    INTERVAL/SUBJECTIVE:  Patient reports overall improvement in bleeding, abdominal pain, denies nausea or vomiting. Patient resting comfortably in bed.       Allergies    IV Contrast (Other)  penicillins (Flushing)  sulfa drugs (Swelling)    Intolerances      MEDICATIONS:  MEDICATIONS  (STANDING):  cefTRIAXone   IVPB 1000 milliGRAM(s) IV Intermittent every 24 hours  dextrose 5% + sodium chloride 0.45% with potassium chloride 20 mEq/L 1000 milliLiter(s) (90 mL/Hr) IV Continuous <Continuous>  metroNIDAZOLE  IVPB 500 milliGRAM(s) IV Intermittent every 8 hours  pantoprazole  Injectable 20 milliGRAM(s) IV Push daily    MEDICATIONS  (PRN):  acetaminophen     Tablet .. 1000 milliGRAM(s) Oral every 6 hours PRN Mild Pain (1 - 3)  ALPRAZolam 0.25 milliGRAM(s) Oral at bedtime PRN Sleep aid  ondansetron Injectable 4 milliGRAM(s) IV Push every 6 hours PRN Nausea    Vital Signs Last 24 Hrs  T(C): 36.6 (08 Apr 2025 08:40), Max: 37 (07 Apr 2025 16:17)  T(F): 97.9 (08 Apr 2025 08:40), Max: 98.6 (07 Apr 2025 16:17)  HR: 76 (08 Apr 2025 08:40) (76 - 96)  BP: 113/55 (08 Apr 2025 08:40) (113/55 - 170/74)  BP(mean): 98 (08 Apr 2025 04:59) (98 - 98)  RR: 18 (08 Apr 2025 08:40) (16 - 18)  SpO2: 97% (08 Apr 2025 08:40) (96% - 97%)    Parameters below as of 08 Apr 2025 08:40  Patient On (Oxygen Delivery Method): room air        04-07 @ 07:01  -  04-08 @ 07:00  --------------------------------------------------------  IN: 2250 mL / OUT: 1000 mL / NET: 1250 mL    04-08 @ 07:01  -  04-08 @ 12:33  --------------------------------------------------------  IN: 840 mL / OUT: 700 mL / NET: 140 mL        General: Well developed, well nourished, in no acute distress  Eyes: Anicteric sclerae, moist conjunctivae, extraocular motions intact, pupils equal round and reactive to light  HENT: Pink and moist mucous membranes, good dentition, tongue normal in appearance without lesions and has symmetrical movement, no obvious oral lesions noted, pharynx normal without tonsillar swelling or exudates  Abdomen: Symmetric appearing, no obvious lesions, non-distended, normal bowel sounds, soft, non-tender to palpation, no rebound or guarding  Skin: Warm and dry, no obvious rash, no jaundice      LABS:                        12.6   11.58 )-----------( 227      ( 08 Apr 2025 05:30 )             38.7     04-08    140  |  105  |  10  ----------------------------<  110[H]  3.6   |  24  |  0.71    Ca    8.6      08 Apr 2025 05:30  Phos  2.5     04-08  Mg     2.0     04-08          RADIOLOGY & ADDITIONAL STUDIES: Reviewed

## 2025-04-09 LAB
ANION GAP SERPL CALC-SCNC: 10 MMOL/L — SIGNIFICANT CHANGE UP (ref 5–17)
BUN SERPL-MCNC: 7 MG/DL — SIGNIFICANT CHANGE UP (ref 7–23)
CALCIUM SERPL-MCNC: 8.8 MG/DL — SIGNIFICANT CHANGE UP (ref 8.4–10.5)
CHLORIDE SERPL-SCNC: 107 MMOL/L — SIGNIFICANT CHANGE UP (ref 96–108)
CO2 SERPL-SCNC: 25 MMOL/L — SIGNIFICANT CHANGE UP (ref 22–31)
CREAT SERPL-MCNC: 0.83 MG/DL — SIGNIFICANT CHANGE UP (ref 0.5–1.3)
EGFR: 74 ML/MIN/1.73M2 — SIGNIFICANT CHANGE UP
EGFR: 74 ML/MIN/1.73M2 — SIGNIFICANT CHANGE UP
GLUCOSE SERPL-MCNC: 91 MG/DL — SIGNIFICANT CHANGE UP (ref 70–99)
HCT VFR BLD CALC: 36.8 % — SIGNIFICANT CHANGE UP (ref 34.5–45)
HGB BLD-MCNC: 12.1 G/DL — SIGNIFICANT CHANGE UP (ref 11.5–15.5)
MAGNESIUM SERPL-MCNC: 2 MG/DL — SIGNIFICANT CHANGE UP (ref 1.6–2.6)
MCHC RBC-ENTMCNC: 29.2 PG — SIGNIFICANT CHANGE UP (ref 27–34)
MCHC RBC-ENTMCNC: 32.9 G/DL — SIGNIFICANT CHANGE UP (ref 32–36)
MCV RBC AUTO: 88.9 FL — SIGNIFICANT CHANGE UP (ref 80–100)
NRBC BLD AUTO-RTO: 0 /100 WBCS — SIGNIFICANT CHANGE UP (ref 0–0)
PHOSPHATE SERPL-MCNC: 3 MG/DL — SIGNIFICANT CHANGE UP (ref 2.5–4.5)
PLATELET # BLD AUTO: 200 K/UL — SIGNIFICANT CHANGE UP (ref 150–400)
POTASSIUM SERPL-MCNC: 3.8 MMOL/L — SIGNIFICANT CHANGE UP (ref 3.5–5.3)
POTASSIUM SERPL-SCNC: 3.8 MMOL/L — SIGNIFICANT CHANGE UP (ref 3.5–5.3)
RBC # BLD: 4.14 M/UL — SIGNIFICANT CHANGE UP (ref 3.8–5.2)
RBC # FLD: 13.4 % — SIGNIFICANT CHANGE UP (ref 10.3–14.5)
SODIUM SERPL-SCNC: 142 MMOL/L — SIGNIFICANT CHANGE UP (ref 135–145)
WBC # BLD: 8.75 K/UL — SIGNIFICANT CHANGE UP (ref 3.8–10.5)
WBC # FLD AUTO: 8.75 K/UL — SIGNIFICANT CHANGE UP (ref 3.8–10.5)

## 2025-04-09 PROCEDURE — 99232 SBSQ HOSP IP/OBS MODERATE 35: CPT

## 2025-04-09 RX ADMIN — Medication 100 MILLIGRAM(S): at 14:29

## 2025-04-09 RX ADMIN — Medication 100 MILLIGRAM(S): at 22:25

## 2025-04-09 RX ADMIN — Medication 0.25 MILLIGRAM(S): at 00:53

## 2025-04-09 RX ADMIN — Medication 100 MILLIGRAM(S): at 06:48

## 2025-04-09 RX ADMIN — Medication 20 MILLIEQUIVALENT(S): at 08:12

## 2025-04-09 RX ADMIN — Medication 0.25 MILLIGRAM(S): at 23:25

## 2025-04-09 RX ADMIN — Medication 20 MILLIGRAM(S): at 11:47

## 2025-04-09 RX ADMIN — CEFTRIAXONE 100 MILLIGRAM(S): 500 INJECTION, POWDER, FOR SOLUTION INTRAMUSCULAR; INTRAVENOUS at 09:17

## 2025-04-09 NOTE — CHART NOTE - NSCHARTNOTEFT_GEN_A_CORE
Serial Abdominal Exam    SUBJECTIVE: Pt seen and examined at bedside. Reports several bloody bowel movements since starting a clear liquid diet. The blood is mixed in with stool. Denies any nausea or vomiting. Denies dizziness, lightheadedness.         Objective   MEDICATIONS  (STANDING):  cefTRIAXone   IVPB 1000 milliGRAM(s) IV Intermittent every 24 hours  metroNIDAZOLE  IVPB 500 milliGRAM(s) IV Intermittent every 8 hours  pantoprazole  Injectable 20 milliGRAM(s) IV Push daily    MEDICATIONS  (PRN):  acetaminophen     Tablet .. 1000 milliGRAM(s) Oral every 6 hours PRN Mild Pain (1 - 3)  ALPRAZolam 0.25 milliGRAM(s) Oral at bedtime PRN Sleep aid  ondansetron Injectable 4 milliGRAM(s) IV Push every 6 hours PRN Nausea      Vital Signs Last 24 Hrs  T(C): 36.7 (08 Apr 2025 23:45), Max: 36.8 (08 Apr 2025 16:37)  T(F): 98 (08 Apr 2025 23:45), Max: 98.3 (08 Apr 2025 16:37)  HR: 91 (08 Apr 2025 23:45) (76 - 91)  BP: 174/81 (08 Apr 2025 23:45) (113/55 - 174/81)  BP(mean): 98 (08 Apr 2025 04:59) (98 - 98)  RR: 18 (08 Apr 2025 23:45) (17 - 18)  SpO2: 97% (08 Apr 2025 23:45) (96% - 98%)    Parameters below as of 08 Apr 2025 23:45  Patient On (Oxygen Delivery Method): room air        Physical Exam:  General: NAD, resting comfortably in bed  Pulmonary: Nonlabored breathing, no respiratory distress  Cardiovascular: NSR  Abdominal: soft, NT/ND, minimal LLQ tenderness to palpation.   Extremities: WWP, normal strength  Neuro: A/O x 3  Pulses: palpable distal pulses    I&O's Summary    07 Apr 2025 07:01  -  08 Apr 2025 07:00  --------------------------------------------------------  IN: 2250 mL / OUT: 1000 mL / NET: 1250 mL    08 Apr 2025 07:01  -  09 Apr 2025 00:15  --------------------------------------------------------  IN: 1500 mL / OUT: 2450 mL / NET: -950 mL        LABS:                        12.6   11.58 )-----------( 227      ( 08 Apr 2025 05:30 )             38.7     04-08    140  |  105  |  10  ----------------------------<  110[H]  3.6   |  24  |  0.71    Ca    8.6      08 Apr 2025 05:30  Phos  2.5     04-08  Mg     2.0     04-08        Urinalysis Basic - ( 08 Apr 2025 05:30 )    Color: x / Appearance: x / SG: x / pH: x  Gluc: 110 mg/dL / Ketone: x  / Bili: x / Urobili: x   Blood: x / Protein: x / Nitrite: x   Leuk Esterase: x / RBC: x / WBC x   Sq Epi: x / Non Sq Epi: x / Bacteria: x      CAPILLARY BLOOD GLUCOSE            Will continue to monitor.

## 2025-04-09 NOTE — PROGRESS NOTE ADULT - SUBJECTIVE AND OBJECTIVE BOX
Patient is a 74y old  Female who presents with a chief complaint of Colitis (09 Apr 2025 05:33).    Patient seen and examined today. She reports improvement of abdominal pain. She reports improvement of bloody stools. She denies vomiting, fever, dyspnea, chest pain.    Allergies    IV Contrast (Other)  penicillins (Flushing)  sulfa drugs (Swelling)    Last Bowel Movement: 09-Apr-2025 (04-09-25 @ 08:30)    MEDICATIONS  (STANDING):  cefTRIAXone   IVPB 1000 milliGRAM(s) IV Intermittent every 24 hours  metroNIDAZOLE  IVPB 500 milliGRAM(s) IV Intermittent every 8 hours  pantoprazole  Injectable 20 milliGRAM(s) IV Push daily    MEDICATIONS  (PRN):  acetaminophen     Tablet .. 1000 milliGRAM(s) Oral every 6 hours PRN Mild Pain (1 - 3)  ALPRAZolam 0.25 milliGRAM(s) Oral at bedtime PRN Sleep aid  ondansetron Injectable 4 milliGRAM(s) IV Push every 6 hours PRN Nausea    Vital Signs Last 24 Hrs  T(C): 36.4 (04-09-25 @ 08:30), Max: 36.8 (04-08-25 @ 16:37)  T(F): 97.5 (04-09-25 @ 08:30), Max: 98.3 (04-08-25 @ 16:37)  HR: 72 (04-09-25 @ 08:30) (72 - 91)  BP: 145/73 (04-09-25 @ 08:30) (144/75 - 174/81)  BP(mean): --  RR: 18 (04-09-25 @ 08:30) (17 - 18)  SpO2: 96% (04-09-25 @ 08:30) (95% - 98%)    I&O's Summary    08 Apr 2025 07:01  -  09 Apr 2025 07:00  --------------------------------------------------------  IN: 1500 mL / OUT: 2950 mL / NET: -1450 mL    09 Apr 2025 07:01  -  09 Apr 2025 11:52  --------------------------------------------------------  IN: 450 mL / OUT: 0 mL / NET: 450 mL      Oxygen Saturation Index= Unable to calculate   [Based on FiO2 = Unknown, SpO2 = 96(04/09/2025 08:30), MAP = Unknown]    PHYSICAL EXAM:  GENERAL: NAD  HEAD:  Atraumatic, Normocephalic  EYES: EOMI, conjunctiva and sclera clear  ENMT: Moist mucous membranes  NECK: Supple, No JVD  NERVOUS SYSTEM:  Alert & Oriented X3, moves extremities  CHEST/LUNG: Clear to auscultation bilaterally  HEART: Regular rate and rhythm; Normal S1 and S2  ABDOMEN: Soft, left lower abdominal tenderness without guarding; Bowel sounds present  EXTREMITIES:  2+ Peripheral Pulses, no edema  PSYCH: Normal mood and affect    Investigations:                        12.1   8.75  )-----------( 200      ( 09 Apr 2025 05:30 )             36.8     04-09    142  |  107  |  7   ----------------------------<  91  3.8   |  25  |  0.83    Ca    8.8      09 Apr 2025 05:30  Phos  3.0     04-09  Mg     2.0     04-09

## 2025-04-09 NOTE — PROGRESS NOTE ADULT - ATTENDING COMMENTS
Pain essentially gone. Trace of blood only.  Main complaint is watery BM 30 minutes after drinking fluids  AFVSS  Abd soft, ND NT  WBC normalized    A/P: Resolving ischemic colitis.  1. On clear liquids, advance to low residue diet  2. Ambulate  3. Ceftriaxone, flagyl

## 2025-04-09 NOTE — PROGRESS NOTE ADULT - SUBJECTIVE AND OBJECTIVE BOX
ON: CELIA    SUBJECTIVE:      MEDICATIONS  (STANDING):  cefTRIAXone   IVPB 1000 milliGRAM(s) IV Intermittent every 24 hours  metroNIDAZOLE  IVPB 500 milliGRAM(s) IV Intermittent every 8 hours  pantoprazole  Injectable 20 milliGRAM(s) IV Push daily    MEDICATIONS  (PRN):  acetaminophen     Tablet .. 1000 milliGRAM(s) Oral every 6 hours PRN Mild Pain (1 - 3)  ALPRAZolam 0.25 milliGRAM(s) Oral at bedtime PRN Sleep aid  ondansetron Injectable 4 milliGRAM(s) IV Push every 6 hours PRN Nausea      Vital Signs Last 24 Hrs  T(C): 36.7 (08 Apr 2025 23:45), Max: 36.8 (08 Apr 2025 16:37)  T(F): 98 (08 Apr 2025 23:45), Max: 98.3 (08 Apr 2025 16:37)  HR: 91 (08 Apr 2025 23:45) (76 - 91)  BP: 152/77 (09 Apr 2025 00:36) (113/55 - 174/81)  BP(mean): --  RR: 18 (08 Apr 2025 23:45) (17 - 18)  SpO2: 97% (08 Apr 2025 23:45) (97% - 98%)    Parameters below as of 08 Apr 2025 23:45  Patient On (Oxygen Delivery Method): room air        PHYSICAL EXAM  General Appearance: Appears well, NAD  Pulmonary: Nonlabored breathing, no respiratory distress  Abdomen: Soft, nondistended, nontender  Extremities: WWP, SCD's in place       I&O's Detail    07 Apr 2025 07:01  -  08 Apr 2025 07:00  --------------------------------------------------------  IN:    dextrose 5% + sodium chloride 0.45% w/ Additives: 1350 mL    IV PiggyBack: 50 mL    IV PiggyBack: 100 mL    IV PiggyBack: 300 mL    Lactated Ringers: 450 mL  Total IN: 2250 mL    OUT:    Voided (mL): 1000 mL  Total OUT: 1000 mL    Total NET: 1250 mL      08 Apr 2025 07:01  -  09 Apr 2025 05:34  --------------------------------------------------------  IN:    dextrose 5% + sodium chloride 0.45% w/ Additives: 900 mL    Oral Fluid: 600 mL  Total IN: 1500 mL    OUT:    Voided (mL): 2750 mL  Total OUT: 2750 mL    Total NET: -1250 mL          LABS:                        12.6   11.58 )-----------( 227      ( 08 Apr 2025 05:30 )             38.7     04-08    140  |  105  |  10  ----------------------------<  110[H]  3.6   |  24  |  0.71    Ca    8.6      08 Apr 2025 05:30  Phos  2.5     04-08  Mg     2.0     04-08        Urinalysis Basic - ( 08 Apr 2025 05:30 )    Color: x / Appearance: x / SG: x / pH: x  Gluc: 110 mg/dL / Ketone: x  / Bili: x / Urobili: x   Blood: x / Protein: x / Nitrite: x   Leuk Esterase: x / RBC: x / WBC x   Sq Epi: x / Non Sq Epi: x / Bacteria: x        RADIOLOGY & ADDITIONAL STUDIES: ON: CELIA    SUBJECTIVE: Pt seen and examined by chief resident. Pt is doing well, resting comfortably on bed. Tolerated clears overnight. Reports increasing amount of liquid BMs with clear liquid diet. Reports that the blood in BMs is overall improving. Pain controlled. No nausea or vomiting.       MEDICATIONS  (STANDING):  cefTRIAXone   IVPB 1000 milliGRAM(s) IV Intermittent every 24 hours  metroNIDAZOLE  IVPB 500 milliGRAM(s) IV Intermittent every 8 hours  pantoprazole  Injectable 20 milliGRAM(s) IV Push daily    MEDICATIONS  (PRN):  acetaminophen     Tablet .. 1000 milliGRAM(s) Oral every 6 hours PRN Mild Pain (1 - 3)  ALPRAZolam 0.25 milliGRAM(s) Oral at bedtime PRN Sleep aid  ondansetron Injectable 4 milliGRAM(s) IV Push every 6 hours PRN Nausea      Vital Signs Last 24 Hrs  T(C): 36.7 (08 Apr 2025 23:45), Max: 36.8 (08 Apr 2025 16:37)  T(F): 98 (08 Apr 2025 23:45), Max: 98.3 (08 Apr 2025 16:37)  HR: 91 (08 Apr 2025 23:45) (76 - 91)  BP: 152/77 (09 Apr 2025 00:36) (113/55 - 174/81)  BP(mean): --  RR: 18 (08 Apr 2025 23:45) (17 - 18)  SpO2: 97% (08 Apr 2025 23:45) (97% - 98%)    Parameters below as of 08 Apr 2025 23:45  Patient On (Oxygen Delivery Method): room air        PHYSICAL EXAM  General Appearance: Appears well, NAD  Pulmonary: Nonlabored breathing, no respiratory distress  Abdomen: Soft, nondistended, mildly tender in the LLQ  Extremities: WWP, SCD's in place       I&O's Detail    07 Apr 2025 07:01  -  08 Apr 2025 07:00  --------------------------------------------------------  IN:    dextrose 5% + sodium chloride 0.45% w/ Additives: 1350 mL    IV PiggyBack: 50 mL    IV PiggyBack: 100 mL    IV PiggyBack: 300 mL    Lactated Ringers: 450 mL  Total IN: 2250 mL    OUT:    Voided (mL): 1000 mL  Total OUT: 1000 mL    Total NET: 1250 mL      08 Apr 2025 07:01  -  09 Apr 2025 05:34  --------------------------------------------------------  IN:    dextrose 5% + sodium chloride 0.45% w/ Additives: 900 mL    Oral Fluid: 600 mL  Total IN: 1500 mL    OUT:    Voided (mL): 2750 mL  Total OUT: 2750 mL    Total NET: -1250 mL          LABS:                        12.6   11.58 )-----------( 227      ( 08 Apr 2025 05:30 )             38.7     04-08    140  |  105  |  10  ----------------------------<  110[H]  3.6   |  24  |  0.71    Ca    8.6      08 Apr 2025 05:30  Phos  2.5     04-08  Mg     2.0     04-08        Urinalysis Basic - ( 08 Apr 2025 05:30 )    Color: x / Appearance: x / SG: x / pH: x  Gluc: 110 mg/dL / Ketone: x  / Bili: x / Urobili: x   Blood: x / Protein: x / Nitrite: x   Leuk Esterase: x / RBC: x / WBC x   Sq Epi: x / Non Sq Epi: x / Bacteria: x        RADIOLOGY & ADDITIONAL STUDIES:

## 2025-04-09 NOTE — PROGRESS NOTE ADULT - ASSESSMENT
73yo Female pt with PMH of diverticulitis, ischemic colitis (2016), PSx of adenoidectomy, hysteroscopy. Presents due to LLQ pain and BRBPR, CT in ED initially noted cecal bascule that later noted to have resolved with cecum in natural position, additionally, noted to have thickened descending colon raising concern for inflammatory colitis. Given elevated WBC and colitis picture on CT, favor infectious, although ischemic colitis cannot be ruled out given prior hx of ischemic colitis. Plan to admit the patient for further work up.     CLD  CTX/flagyl  SCDs/IS/OOB/SQH  Pain management with PO Tylenol and Dilaudid PRN  AM labs   73yo Female pt with PMH of diverticulitis, ischemic colitis (2016), PSx of adenoidectomy, hysteroscopy. Presents due to LLQ pain and BRBPR, CT in ED initially noted cecal bascule that later noted to have resolved with cecum in natural position, additionally, noted to have thickened descending colon raising concern for inflammatory colitis. Given elevated WBC and colitis picture on CT, favor infectious, although ischemic colitis cannot be ruled out given prior hx of ischemic colitis. Plan to admit the patient for further work up.     adv to [facility]  CTX/flagyl  SCDs/IS/OOB/SQH  Pain management with PO Tylenol and Dilaudid PRN  AM labs

## 2025-04-09 NOTE — CHART NOTE - NSCHARTNOTEFT_GEN_A_CORE
Serial Abdominal Exam    SUBJECTIVE: Pt seen and examined. Reports that since she started a clear liquid diet, she has had an increase in the frequency of her bowel movements. While there is still blood in the bowel movements, there is less than when she first presented to the hospital. Describes this as a setback in her hospitalization. Denies any nausea or vomiting. Abdominal pain is well controlled. She is passing gas.       Objective   MEDICATIONS  (STANDING):  cefTRIAXone   IVPB 1000 milliGRAM(s) IV Intermittent every 24 hours  metroNIDAZOLE  IVPB 500 milliGRAM(s) IV Intermittent every 8 hours  pantoprazole  Injectable 20 milliGRAM(s) IV Push daily    MEDICATIONS  (PRN):  acetaminophen     Tablet .. 1000 milliGRAM(s) Oral every 6 hours PRN Mild Pain (1 - 3)  ALPRAZolam 0.25 milliGRAM(s) Oral at bedtime PRN Sleep aid  ondansetron Injectable 4 milliGRAM(s) IV Push every 6 hours PRN Nausea      Vital Signs Last 24 Hrs  T(C): 36.7 (08 Apr 2025 23:45), Max: 36.8 (08 Apr 2025 16:37)  T(F): 98 (08 Apr 2025 23:45), Max: 98.3 (08 Apr 2025 16:37)  HR: 91 (08 Apr 2025 23:45) (76 - 91)  BP: 174/81 (08 Apr 2025 23:45) (113/55 - 174/81)  BP(mean): 98 (08 Apr 2025 04:59) (98 - 98)  RR: 18 (08 Apr 2025 23:45) (17 - 18)  SpO2: 97% (08 Apr 2025 23:45) (96% - 98%)    Parameters below as of 08 Apr 2025 23:45  Patient On (Oxygen Delivery Method): room air        Physical Exam:  General: NAD, resting comfortably in bed  Pulmonary: Nonlabored breathing, no respiratory distress  Cardiovascular: NSR  Abdominal: soft, nondistended, very minimal LLQ tenderness to palpation.   Extremities: WWP, normal strength  Neuro: A/O x 3  Pulses: palpable distal pulses    I&O's Summary    07 Apr 2025 07:01  -  08 Apr 2025 07:00  --------------------------------------------------------  IN: 2250 mL / OUT: 1000 mL / NET: 1250 mL    08 Apr 2025 07:01  -  09 Apr 2025 00:13  --------------------------------------------------------  IN: 1500 mL / OUT: 2450 mL / NET: -950 mL        LABS:                        12.6   11.58 )-----------( 227      ( 08 Apr 2025 05:30 )             38.7     04-08    140  |  105  |  10  ----------------------------<  110[H]  3.6   |  24  |  0.71    Ca    8.6      08 Apr 2025 05:30  Phos  2.5     04-08  Mg     2.0     04-08        Urinalysis Basic - ( 08 Apr 2025 05:30 )    Color: x / Appearance: x / SG: x / pH: x  Gluc: 110 mg/dL / Ketone: x  / Bili: x / Urobili: x   Blood: x / Protein: x / Nitrite: x   Leuk Esterase: x / RBC: x / WBC x   Sq Epi: x / Non Sq Epi: x / Bacteria: x      CAPILLARY BLOOD GLUCOSE            Will continue to monitor.

## 2025-04-09 NOTE — PROGRESS NOTE ADULT - ASSESSMENT
Ms. Alie Houser is a 74/F history of diverticulitis, ischemic colitis (2016), history of adenoidectomy, hysteroscopy presenting with LLQ pain and BRBPR with CT findings of thickened descending colon admitted for management and further work up of colitis.     Recommendations:    #Likely ischemic colitis  - Management per Surgery  - provide adequate analgesia, bowel regimen, mobilize with fall precautions, incentive spirometry, DVT prophylaxis  - GI consulted and recommendations noted  - diet advancement per surgery and GI; patient tolerating current diet  - on Ceftriaxone and Flagyl  - C diff negative and GI PCR negative; afebrile and WBC trended down  - monitor CBC; Hgb stable    #Hypokalemia, improved  - monitor and replace as needed  - Mg normal    DVT ppx SCDs    Feel free to reach out for any questions. Recommendations discussed with primary team.

## 2025-04-10 ENCOUNTER — TRANSCRIPTION ENCOUNTER (OUTPATIENT)
Age: 75
End: 2025-04-10

## 2025-04-10 PROCEDURE — 99233 SBSQ HOSP IP/OBS HIGH 50: CPT

## 2025-04-10 PROCEDURE — 99231 SBSQ HOSP IP/OBS SF/LOW 25: CPT

## 2025-04-10 RX ADMIN — CEFTRIAXONE 100 MILLIGRAM(S): 500 INJECTION, POWDER, FOR SOLUTION INTRAMUSCULAR; INTRAVENOUS at 09:48

## 2025-04-10 RX ADMIN — Medication 20 MILLIGRAM(S): at 07:46

## 2025-04-10 RX ADMIN — Medication 100 MILLIGRAM(S): at 22:12

## 2025-04-10 RX ADMIN — Medication 100 MILLIGRAM(S): at 06:10

## 2025-04-10 RX ADMIN — Medication 100 MILLIGRAM(S): at 15:18

## 2025-04-10 NOTE — DISCHARGE NOTE PROVIDER - CARE PROVIDERS DIRECT ADDRESSES
[email or web address]

## 2025-04-10 NOTE — DISCHARGE NOTE PROVIDER - CARE PROVIDER_API CALL
Anselmo Isaac  Colon/Rectal Surgery  [address] [place] [address], [address]  [place] [address]  Phone: [phone]  Fax: [phone]  Follow Up Time: Routine

## 2025-04-10 NOTE — PROGRESS NOTE ADULT - ASSESSMENT
Ms. Alie Houser is a 74/F history of diverticulitis, ischemic colitis (2016), history of adenoidectomy, hysteroscopy presenting with LLQ pain and BRBPR with CT findings of thickened descending colon admitted for management and further work up of colitis.     Recommendations:    #Likely ischemic colitis  - Management per Surgery  - provide adequate analgesia, bowel regimen, mobilize with fall precautions, incentive spirometry, DVT prophylaxis  - GI following and recommendations noted  - diet advancement per surgery and GI; patient tolerating current diet  - on Ceftriaxone and Flagyl  - C diff negative and GI PCR negative; afebrile and WBC trended down  - monitor CBC; Hgb stable    #GERD  - on Pantoprazole    #Hypokalemia, improved  - monitor and replace as needed    DVT ppx SCDs    Feel free to reach out for any questions. Recommendations discussed with primary team.

## 2025-04-10 NOTE — PROGRESS NOTE ADULT - SUBJECTIVE AND OBJECTIVE BOX
Patient is a 74y old  Female who presents with a chief complaint of Colitis (10 Apr 2025 06:03).    Patient seen and examined today. She reports feeling better with no more bloody stools. She denies abdominal pain and is enjoying her [place] toast this morning. She was having intermittent acid reflux responding to Pantoprazole.     Allergies    IV Contrast (Other)  [place] (Vomiting)  penicillins (Flushing)  Turmeric (Unknown)  sulfa drugs (Swelling)    Last Bowel Movement: 10-Apr-2025 (04-10-25 @ 08:45)    MEDICATIONS  (STANDING):  cefTRIAXone   IVPB 1000 milliGRAM(s) IV Intermittent every 24 hours  metroNIDAZOLE  IVPB 500 milliGRAM(s) IV Intermittent every 8 hours  pantoprazole  Injectable 20 milliGRAM(s) IV Push daily    MEDICATIONS  (PRN):  acetaminophen     Tablet .. 1000 milliGRAM(s) Oral every 6 hours PRN Mild Pain (1 - 3)  ALPRAZolam 0.25 milliGRAM(s) Oral at bedtime PRN Sleep aid  ondansetron Injectable 4 milliGRAM(s) IV Push every 6 hours PRN Nausea    Vital Signs Last 24 Hrs  T(C): 36.6 (04-10-25 @ 08:45), Max: 36.6 (04-10-25 @ 08:45)  T(F): 97.8 (04-10-25 @ 08:45), Max: 97.8 (04-10-25 @ 08:45)  HR: 70 (04-10-25 @ 08:45) (70 - 83)  BP: 106/58 (04-10-25 @ 08:45) (106/58 - 150/75)  BP(mean): --  RR: 17 (04-10-25 @ 08:45) (17 - 18)  SpO2: 98% (04-10-25 @ 08:45) (96% - 98%)    I&O's Summary    09 Apr 2025 07:01  -  10 Apr 2025 07:00  --------------------------------------------------------  IN: 900 mL / OUT: 1750 mL / NET: -850 mL    10 Apr 2025 07:01  -  10 Apr 2025 11:19  --------------------------------------------------------  IN: 0 mL / OUT: 200 mL / NET: -200 mL      Oxygen Saturation Index= Unable to calculate   [Based on FiO2 = Unknown, SpO2 = 98(04/10/2025 08:45), MAP = Unknown]    PHYSICAL EXAM:  GENERAL: NAD  HEAD:  Atraumatic, Normocephalic  EYES: EOMI, conjunctiva and sclera clear  ENMT: Moist mucous membranes  NECK: Supple, No JVD  NERVOUS SYSTEM:  Alert & Oriented X3, moves extremities  CHEST/LUNG: Clear to auscultation bilaterally  HEART: Regular rate and rhythm; Normal S1 and S2  ABDOMEN: Soft, nontender; Bowel sounds present  EXTREMITIES:  2+ Peripheral Pulses, no edema  PSYCH: Normal mood and affect    Investigations:                        12.1   8.75  )-----------( 200      ( 09 Apr 2025 05:30 )             36.8     04-09    142  |  107  |  7   ----------------------------<  91  3.8   |  25  |  0.83    Ca    8.8      09 Apr 2025 05:30  Phos  3.0     04-09  Mg     2.0     04-09

## 2025-04-10 NOTE — PROGRESS NOTE ADULT - SUBJECTIVE AND OBJECTIVE BOX
INTERVAL HPI/OVERNIGHT EVENTS:    STATUS POST:      POST OPERATIVE DAY #:     SUBJECTIVE:  Flatus: [ ] YES [ ] NO             Bowel Movement: [ ] YES [ ] NO  Pain (0-10):            Pain Control Adequate: [ ] YES [ ] NO  Nausea: [ ] YES [ ] NO            Vomiting: [ ] YES [ ] NO  Diarrhea: [ ] YES [ ] NO         Constipation: [ ] YES [ ] NO     Chest Pain: [ ] YES [ ] NO    SOB:  [ ] YES [ ] NO    MEDICATIONS  (STANDING):  cefTRIAXone   IVPB 1000 milliGRAM(s) IV Intermittent every 24 hours  metroNIDAZOLE  IVPB 500 milliGRAM(s) IV Intermittent every 8 hours  pantoprazole  Injectable 20 milliGRAM(s) IV Push daily    MEDICATIONS  (PRN):  acetaminophen     Tablet .. 1000 milliGRAM(s) Oral every 6 hours PRN Mild Pain (1 - 3)  ALPRAZolam 0.25 milliGRAM(s) Oral at bedtime PRN Sleep aid  ondansetron Injectable 4 milliGRAM(s) IV Push every 6 hours PRN Nausea      Vital Signs Last 24 Hrs  T(C): 36.4 (09 Apr 2025 20:13), Max: 36.5 (09 Apr 2025 06:31)  T(F): 97.6 (09 Apr 2025 20:13), Max: 97.7 (09 Apr 2025 06:31)  HR: 82 (09 Apr 2025 20:13) (72 - 83)  BP: 150/75 (09 Apr 2025 20:13) (136/69 - 150/75)  BP(mean): --  RR: 18 (09 Apr 2025 20:13) (17 - 18)  SpO2: 98% (09 Apr 2025 20:13) (95% - 98%)    Parameters below as of 09 Apr 2025 20:13  Patient On (Oxygen Delivery Method): room air        PHYSICAL EXAM:      Constitutional: A&Ox3    Breasts:    Respiratory: non labored breathing, no respiratory distress    Cardiovascular: NSR, RRR    Gastrointestinal:                 Incision:    Genitourinary:    Extremities: (-) edema                  I&O's Detail    08 Apr 2025 07:01  -  09 Apr 2025 07:00  --------------------------------------------------------  IN:    dextrose 5% + sodium chloride 0.45% w/ Additives: 900 mL    Oral Fluid: 600 mL  Total IN: 1500 mL    OUT:    Voided (mL): 2950 mL  Total OUT: 2950 mL    Total NET: -1450 mL      09 Apr 2025 07:01  -  10 Apr 2025 06:03  --------------------------------------------------------  IN:    Oral Fluid: 900 mL  Total IN: 900 mL    OUT:    Voided (mL): 1650 mL  Total OUT: 1650 mL    Total NET: -750 mL          LABS:                        12.1   8.75  )-----------( 200      ( 09 Apr 2025 05:30 )             36.8     04-09    142  |  107  |  7   ----------------------------<  91  3.8   |  25  |  0.83    Ca    8.8      09 Apr 2025 05:30  Phos  3.0     04-09  Mg     2.0     04-09        Urinalysis Basic - ( 09 Apr 2025 05:30 )    Color: x / Appearance: x / SG: x / pH: x  Gluc: 91 mg/dL / Ketone: x  / Bili: x / Urobili: x   Blood: x / Protein: x / Nitrite: x   Leuk Esterase: x / RBC: x / WBC x   Sq Epi: x / Non Sq Epi: x / Bacteria: x        RADIOLOGY & ADDITIONAL STUDIES: INTERVAL HPI/OVERNIGHT EVENTS: several BMs, very little bood    SUBJECTIVE: Patient seen and examined with chief resident on AM rounds. Patient endorses some abdominal cramping with eating. Pt states her cramping has improved since admission. +fl/+bm (non bloody). Denies n/v/f/c/sob/cp.     MEDICATIONS  (STANDING):  cefTRIAXone   IVPB 1000 milliGRAM(s) IV Intermittent every 24 hours  metroNIDAZOLE  IVPB 500 milliGRAM(s) IV Intermittent every 8 hours  pantoprazole  Injectable 20 milliGRAM(s) IV Push daily    MEDICATIONS  (PRN):  acetaminophen     Tablet .. 1000 milliGRAM(s) Oral every 6 hours PRN Mild Pain (1 - 3)  ALPRAZolam 0.25 milliGRAM(s) Oral at bedtime PRN Sleep aid  ondansetron Injectable 4 milliGRAM(s) IV Push every 6 hours PRN Nausea      Vital Signs Last 24 Hrs  T(C): 36.4 (09 Apr 2025 20:13), Max: 36.5 (09 Apr 2025 06:31)  T(F): 97.6 (09 Apr 2025 20:13), Max: 97.7 (09 Apr 2025 06:31)  HR: 82 (09 Apr 2025 20:13) (72 - 83)  BP: 150/75 (09 Apr 2025 20:13) (136/69 - 150/75)  BP(mean): --  RR: 18 (09 Apr 2025 20:13) (17 - 18)  SpO2: 98% (09 Apr 2025 20:13) (95% - 98%)    Parameters below as of 09 Apr 2025 20:13  Patient On (Oxygen Delivery Method): room air        PHYSICAL EXAM:  Constitutional: A&Ox3  Respiratory: non labored breathing, no respiratory distress  Cardiovascular: NSR, RRR  Gastrointestinal: Abd soft, NT/ND  Extremities: (-) edema, WWP          I&O's Detail    08 Apr 2025 07:01  -  09 Apr 2025 07:00  --------------------------------------------------------  IN:    dextrose 5% + sodium chloride 0.45% w/ Additives: 900 mL    Oral Fluid: 600 mL  Total IN: 1500 mL    OUT:    Voided (mL): 2950 mL  Total OUT: 2950 mL    Total NET: -1450 mL      09 Apr 2025 07:01  -  10 Apr 2025 06:03  --------------------------------------------------------  IN:    Oral Fluid: 900 mL  Total IN: 900 mL    OUT:    Voided (mL): 1650 mL  Total OUT: 1650 mL    Total NET: -750 mL          LABS:                        12.1   8.75  )-----------( 200      ( 09 Apr 2025 05:30 )             36.8     04-09    142  |  107  |  7   ----------------------------<  91  3.8   |  25  |  0.83    Ca    8.8      09 Apr 2025 05:30  Phos  3.0     04-09  Mg     2.0     04-09        Urinalysis Basic - ( 09 Apr 2025 05:30 )    Color: x / Appearance: x / SG: x / pH: x  Gluc: 91 mg/dL / Ketone: x  / Bili: x / Urobili: x   Blood: x / Protein: x / Nitrite: x   Leuk Esterase: x / RBC: x / WBC x   Sq Epi: x / Non Sq Epi: x / Bacteria: x        RADIOLOGY & ADDITIONAL STUDIES:

## 2025-04-10 NOTE — PROGRESS NOTE ADULT - NS ATTEND AMEND GEN_ALL_CORE FT
Feels better, only streaking of TP with blood.  AFVSS  Abd soft, minimal LLQ TTP no guarding    A/P: Ischemic colitis, improving  1. Continue abx  2. clear liquids  3. Ambulate  4. Holding SQH
Doing well, tolerating most of solid food but still uneasy.  Diarrhea improved  Abd benign  Observe on LRD today  hope to d/c home tomorrow off antibiotics

## 2025-04-10 NOTE — PROGRESS NOTE ADULT - ASSESSMENT
73yo Female pt with PMH of diverticulitis, ischemic colitis (2016), PSx of adenoidectomy, hysteroscopy. Presents due to LLQ pain and BRBPR, CT in ED initially noted cecal bascule that later noted to have resolved with cecum in natural position, additionally, noted to have thickened descending colon raising concern for inflammatory colitis. Given elevated WBC and colitis picture on CT, favor infectious, although ischemic colitis cannot be ruled out given prior hx of ischemic colitis. Plan to admit the patient for further work up.     LRD  CTX/flagyl  SCDs/IS/OOB/SQH  Pain management with PO Tylenol and Dilaudid PRN

## 2025-04-10 NOTE — DISCHARGE NOTE PROVIDER - NSDCMRMEDTOKEN_GEN_ALL_CORE_FT
aspirin 81 mg oral tablet: orally every 3 days  Estrace 0.5 mg oral tablet: 1 tab(s) orally once a day  pantoprazole 20 mg oral delayed release tablet: 1 tab(s) orally once a day  progesterone 100 mg oral capsule: 1 cap(s) orally every 3 days

## 2025-04-10 NOTE — DISCHARGE NOTE PROVIDER - HOSPITAL COURSE
75yo Female pt with PMH of diverticulitis, ischemic colitis (2016), PSx of adenoidectomy, hysteroscopy. Presents due to LLQ pain and BRBPR, CT in ED initially noted cecal bascule that later noted to have resolved with cecum in natural position, additionally, noted to have thickened descending colon raising concern for inflammatory colitis. Given elevated WBC and colitis picture on CT, favor infectious, although ischemic colitis cannot be ruled out given prior hx of ischemic colitis. Patient admitted to the general surgery service for further work up and management including bowel rest, IVF resuscitation, IV antibiotics, pain control and close monitoring.  Diet advanced as tolerated and per return of bowel function. At time of discharge pt is tolerating diet, pain well controlled, pt is ambulating/voiding freely, having adequate bowel function. Pt is hemodynamically normal/stable and medically ready for discharge with outpatient follow-up.

## 2025-04-11 ENCOUNTER — TRANSCRIPTION ENCOUNTER (OUTPATIENT)
Age: 75
End: 2025-04-11

## 2025-04-11 VITALS
DIASTOLIC BLOOD PRESSURE: 52 MMHG | TEMPERATURE: 98 F | RESPIRATION RATE: 17 BRPM | SYSTOLIC BLOOD PRESSURE: 152 MMHG | OXYGEN SATURATION: 96 % | HEART RATE: 81 BPM

## 2025-04-11 PROCEDURE — 99232 SBSQ HOSP IP/OBS MODERATE 35: CPT

## 2025-04-11 RX ADMIN — Medication 20 MILLIGRAM(S): at 07:23

## 2025-04-11 NOTE — PROGRESS NOTE ADULT - ASSESSMENT
Ms. Alie Houser is a 74/F history of diverticulitis, ischemic colitis (2016), history of adenoidectomy, hysteroscopy presenting with LLQ pain and BRBPR with CT findings of thickened descending colon admitted for management and further work up of colitis.     Recommendations:    #Likely ischemic colitis  - Management per Surgery  - provide adequate analgesia, bowel regimen, mobilize with fall precautions, incentive spirometry, DVT prophylaxis  - GI following and recommendations noted  - diet advancement per surgery and GI; patient tolerating current diet  - given Ceftriaxone and Flagyl  - C diff negative and GI PCR negative; afebrile and WBC trended down  - monitor CBC; Hgb stable  - no new labs today    #GERD  - on Pantoprazole    #Hypokalemia, improved  - monitor and replace as needed    DVT ppx SCDs    Feel free to reach out for any questions. Recommendations discussed with primary team.

## 2025-04-11 NOTE — PROGRESS NOTE ADULT - SUBJECTIVE AND OBJECTIVE BOX
ON: CELIA    SUBJECTIVE:      MEDICATIONS  (STANDING):  cefTRIAXone   IVPB 1000 milliGRAM(s) IV Intermittent every 24 hours  pantoprazole  Injectable 20 milliGRAM(s) IV Push daily    MEDICATIONS  (PRN):  acetaminophen     Tablet .. 1000 milliGRAM(s) Oral every 6 hours PRN Mild Pain (1 - 3)  ALPRAZolam 0.25 milliGRAM(s) Oral at bedtime PRN Sleep aid  ondansetron Injectable 4 milliGRAM(s) IV Push every 6 hours PRN Nausea      Vital Signs Last 24 Hrs  T(C): 36.5 (11 Apr 2025 04:38), Max: 36.6 (10 Apr 2025 08:45)  T(F): 97.7 (11 Apr 2025 04:38), Max: 97.9 (10 Apr 2025 20:31)  HR: 85 (11 Apr 2025 04:38) (70 - 87)  BP: 162/72 (11 Apr 2025 04:38) (106/58 - 168/80)  BP(mean): --  RR: 17 (11 Apr 2025 04:38) (17 - 18)  SpO2: 96% (11 Apr 2025 04:38) (96% - 99%)    Parameters below as of 11 Apr 2025 04:38  Patient On (Oxygen Delivery Method): room air        PHYSICAL EXAM  Constitutional: A&Ox3  Respiratory: non labored breathing, no respiratory distress  Cardiovascular: NSR, RRR  Gastrointestinal: Abd soft, NT/ND  Extremities: (-) edema, WWP      I&O's Detail    09 Apr 2025 07:01  -  10 Apr 2025 07:00  --------------------------------------------------------  IN:    Oral Fluid: 900 mL  Total IN: 900 mL    OUT:    Voided (mL): 1750 mL  Total OUT: 1750 mL    Total NET: -850 mL      10 Apr 2025 07:01  -  11 Apr 2025 05:39  --------------------------------------------------------  IN:    Oral Fluid: 1175 mL  Total IN: 1175 mL    OUT:    Voided (mL): 2300 mL  Total OUT: 2300 mL    Total NET: -1125 mL          LABS:                RADIOLOGY & ADDITIONAL STUDIES: ON: CELIA    SUBJECTIVE: Patient seen and examined at bedside. Patient reports doing well. Incisional pain well controlled with medication. Tolerating LRD without n/v. +flatus and +BM. no blood. Ambulating and voiding.      MEDICATIONS  (STANDING):  cefTRIAXone   IVPB 1000 milliGRAM(s) IV Intermittent every 24 hours  pantoprazole  Injectable 20 milliGRAM(s) IV Push daily    MEDICATIONS  (PRN):  acetaminophen     Tablet .. 1000 milliGRAM(s) Oral every 6 hours PRN Mild Pain (1 - 3)  ALPRAZolam 0.25 milliGRAM(s) Oral at bedtime PRN Sleep aid  ondansetron Injectable 4 milliGRAM(s) IV Push every 6 hours PRN Nausea      Vital Signs Last 24 Hrs  T(C): 36.5 (11 Apr 2025 04:38), Max: 36.6 (10 Apr 2025 08:45)  T(F): 97.7 (11 Apr 2025 04:38), Max: 97.9 (10 Apr 2025 20:31)  HR: 85 (11 Apr 2025 04:38) (70 - 87)  BP: 162/72 (11 Apr 2025 04:38) (106/58 - 168/80)  BP(mean): --  RR: 17 (11 Apr 2025 04:38) (17 - 18)  SpO2: 96% (11 Apr 2025 04:38) (96% - 99%)    Parameters below as of 11 Apr 2025 04:38  Patient On (Oxygen Delivery Method): room air        PHYSICAL EXAM  Constitutional: A&Ox3  Respiratory: non labored breathing, no respiratory distress  Cardiovascular: NSR, RRR  Gastrointestinal: Abd soft, NT/ND  Extremities: (-) edema, WWP      I&O's Detail    09 Apr 2025 07:01  -  10 Apr 2025 07:00  --------------------------------------------------------  IN:    Oral Fluid: 900 mL  Total IN: 900 mL    OUT:    Voided (mL): 1750 mL  Total OUT: 1750 mL    Total NET: -850 mL      10 Apr 2025 07:01  -  11 Apr 2025 05:39  --------------------------------------------------------  IN:    Oral Fluid: 1175 mL  Total IN: 1175 mL    OUT:    Voided (mL): 2300 mL  Total OUT: 2300 mL    Total NET: -1125 mL          LABS:                RADIOLOGY & ADDITIONAL STUDIES:

## 2025-04-11 NOTE — DISCHARGE NOTE NURSING/CASE MANAGEMENT/SOCIAL WORK - FINANCIAL ASSISTANCE
[facility] provides services at a reduced cost to those who are determined to be eligible through [facility]’s financial assistance program. Information regarding [facility]’s financial assistance program can be found by going to [email or web address] or by calling [phone].

## 2025-04-11 NOTE — PROGRESS NOTE ADULT - SUBJECTIVE AND OBJECTIVE BOX
Patient is a 74y old  Female who presents with a chief complaint of Colitis (11 Apr 2025 05:38).    Patient seen and examined today. She feels well overall with no further bloody stools. She only has mild intermittent lower abdominal discomfort. She denies vomiting and is tolerating her diet. She denies headache, chest pain, dyspnea, orthopnea, dizziness, blurry vision or any other new complaints.     Allergies    IV Contrast (Other)  [place] (Vomiting)  penicillins (Flushing)  Turmeric (Unknown)  sulfa drugs (Swelling)    Last Bowel Movement: 10-Apr-2025 (04-11-25 @ 08:23)    MEDICATIONS  (STANDING):  pantoprazole  Injectable 20 milliGRAM(s) IV Push daily    MEDICATIONS  (PRN):  acetaminophen     Tablet .. 1000 milliGRAM(s) Oral every 6 hours PRN Mild Pain (1 - 3)  ALPRAZolam 0.25 milliGRAM(s) Oral at bedtime PRN Sleep aid  ondansetron Injectable 4 milliGRAM(s) IV Push every 6 hours PRN Nausea    Vital Signs Last 24 Hrs  T(C): 36.7 (04-11-25 @ 08:23), Max: 36.7 (04-11-25 @ 08:23)  T(F): 98.1 (04-11-25 @ 08:23), Max: 98.1 (04-11-25 @ 08:23)  HR: 81 (04-11-25 @ 08:23) (81 - 87)  BP: 152/52 (04-11-25 @ 08:23) (147/72 - 168/80)  BP(mean): --  RR: 17 (04-11-25 @ 08:23) (17 - 18)  SpO2: 96% (04-11-25 @ 08:23) (96% - 99%)    I&O's Summary    10 Apr 2025 07:01  -  11 Apr 2025 07:00  --------------------------------------------------------  IN: 1175 mL / OUT: 2300 mL / NET: -1125 mL      Oxygen Saturation Index= Unable to calculate   [Based on FiO2 = Unknown, SpO2 = 96(04/11/2025 08:23), MAP = Unknown]    PHYSICAL EXAM:  GENERAL: NAD  HEAD:  Atraumatic, Normocephalic  EYES: EOMI, conjunctiva and sclera clear  ENMT: Moist mucous membranes  NECK: Supple, No JVD  NERVOUS SYSTEM:  Alert & Oriented X3, moves extremities  CHEST/LUNG: Clear to auscultation bilaterally  HEART: Regular rate and rhythm; Normal S1 and S2  ABDOMEN: Soft, nontender; Bowel sounds present  EXTREMITIES:  2+ Peripheral Pulses, no edema  PSYCH: Normal mood and affect

## 2025-04-11 NOTE — PROGRESS NOTE ADULT - ATTENDING COMMENTS
Doing well  Some loose BMs  Abd NT    D/C home, f/u with me 2 weeks  Stop antibiotics  Low residue diet

## 2025-04-11 NOTE — PROGRESS NOTE ADULT - TIME BILLING
Bedside exam and interview   Reviewed vitals, labs   Discussed patient's plan of care with housestaff   Documentation of encounter
Time spent includes direct patient care  (interview and examination of patient), discussion with other providers, support staff and/or patient's family members, review of medical records, ordering diagnostic tests and analyzing results, and documentation.

## 2025-04-11 NOTE — PROGRESS NOTE ADULT - ASSESSMENT
73yo Female pt with PMH of diverticulitis, ischemic colitis (2016), PSx of adenoidectomy, hysteroscopy. Presents due to LLQ pain and BRBPR, CT in ED initially noted cecal bascule that later noted to have resolved with cecum in natural position, additionally, noted to have thickened descending colon raising concern for inflammatory colitis. Given elevated WBC and colitis picture on CT, favor infectious, although ischemic colitis cannot be ruled out given prior hx of ischemic colitis. Plan to admit the patient for further work up.     LRD  CTX/flagyl  SCDs/IS/OOB/SQH  Pain management with PO Tylenol and Dilaudid PRN 73yo Female pt with PMH of diverticulitis, ischemic colitis (2016), PSx of adenoidectomy, hysteroscopy. Presents due to LLQ pain and BRBPR, CT in ED initially noted cecal bascule that later noted to have resolved with cecum in natural position, additionally, noted to have thickened descending colon raising concern for inflammatory colitis. Given elevated WBC and colitis picture on CT, favor infectious, although ischemic colitis cannot be ruled out given prior hx of ischemic colitis. Plan to admit the patient for further work up.     LRD  Completed CTX/flagyl course  SCDs/IS/OOB/SQH  Pain management with PO Tylenol and Dilaudid PRN  Dispo: Home today

## 2025-04-11 NOTE — DISCHARGE NOTE NURSING/CASE MANAGEMENT/SOCIAL WORK - PATIENT PORTAL LINK FT
You can access the FollowMyHealth Patient Portal offered by [facility] by registering at the following website: [email or web address]. By joining [facility]’s FollowMyHealth portal, you will also be able to view your health information using other applications (apps) compatible with our system.

## 2025-04-17 DIAGNOSIS — K21.9 GASTRO-ESOPHAGEAL REFLUX DISEASE WITHOUT ESOPHAGITIS: ICD-10-CM

## 2025-04-17 DIAGNOSIS — Z87.891 PERSONAL HISTORY OF NICOTINE DEPENDENCE: ICD-10-CM

## 2025-04-17 DIAGNOSIS — E87.6 HYPOKALEMIA: ICD-10-CM

## 2025-04-17 DIAGNOSIS — K63.89 OTHER SPECIFIED DISEASES OF INTESTINE: ICD-10-CM

## 2025-04-17 DIAGNOSIS — K57.90 DIVERTICULOSIS OF INTESTINE, PART UNSPECIFIED, WITHOUT PERFORATION OR ABSCESS WITHOUT BLEEDING: ICD-10-CM

## 2025-04-17 DIAGNOSIS — E87.20 ACIDOSIS, UNSPECIFIED: ICD-10-CM

## 2025-04-17 DIAGNOSIS — K55.9 VASCULAR DISORDER OF INTESTINE, UNSPECIFIED: ICD-10-CM

## 2025-04-17 DIAGNOSIS — R10.9 UNSPECIFIED ABDOMINAL PAIN: ICD-10-CM

## 2025-04-17 DIAGNOSIS — R65.11 SYSTEMIC INFLAMMATORY RESPONSE SYNDROME (SIRS) OF NON-INFECTIOUS ORIGIN WITH ACUTE ORGAN DYSFUNCTION: ICD-10-CM

## 2025-04-29 PROCEDURE — 87449 NOS EACH ORGANISM AG IA: CPT

## 2025-04-29 PROCEDURE — 85652 RBC SED RATE AUTOMATED: CPT

## 2025-04-29 PROCEDURE — 87507 IADNA-DNA/RNA PROBE TQ 12-25: CPT

## 2025-04-29 PROCEDURE — 80076 HEPATIC FUNCTION PANEL: CPT

## 2025-04-29 PROCEDURE — 83690 ASSAY OF LIPASE: CPT

## 2025-04-29 PROCEDURE — 74176 CT ABD & PELVIS W/O CONTRAST: CPT | Mod: MC

## 2025-04-29 PROCEDURE — 97110 THERAPEUTIC EXERCISES: CPT

## 2025-04-29 PROCEDURE — 99285 EMERGENCY DEPT VISIT HI MDM: CPT

## 2025-04-29 PROCEDURE — 93005 ELECTROCARDIOGRAM TRACING: CPT

## 2025-04-29 PROCEDURE — 83735 ASSAY OF MAGNESIUM: CPT

## 2025-04-29 PROCEDURE — 85027 COMPLETE CBC AUTOMATED: CPT

## 2025-04-29 PROCEDURE — 86140 C-REACTIVE PROTEIN: CPT

## 2025-04-29 PROCEDURE — 87324 CLOSTRIDIUM AG IA: CPT

## 2025-04-29 PROCEDURE — 36415 COLL VENOUS BLD VENIPUNCTURE: CPT

## 2025-04-29 PROCEDURE — 85025 COMPLETE CBC W/AUTO DIFF WBC: CPT

## 2025-04-29 PROCEDURE — 80048 BASIC METABOLIC PNL TOTAL CA: CPT

## 2025-04-29 PROCEDURE — 83605 ASSAY OF LACTIC ACID: CPT

## 2025-04-29 PROCEDURE — 97530 THERAPEUTIC ACTIVITIES: CPT

## 2025-04-29 PROCEDURE — 84100 ASSAY OF PHOSPHORUS: CPT

## 2025-05-09 ENCOUNTER — INPATIENT (INPATIENT)
Facility: HOSPITAL | Age: 75
LOS: 2 days | Discharge: HOME | End: 2025-05-12
Attending: STUDENT IN AN ORGANIZED HEALTH CARE EDUCATION/TRAINING PROGRAM | Admitting: INTERNAL MEDICINE
Payer: MEDICARE

## 2025-05-09 VITALS
RESPIRATION RATE: 18 BRPM | OXYGEN SATURATION: 95 % | HEIGHT: 63 IN | WEIGHT: 108.03 LBS | TEMPERATURE: 100 F | SYSTOLIC BLOOD PRESSURE: 170 MMHG | DIASTOLIC BLOOD PRESSURE: 90 MMHG | HEART RATE: 118 BPM

## 2025-05-09 DIAGNOSIS — A04.72 ENTEROCOLITIS DUE TO CLOSTRIDIUM DIFFICILE, NOT SPECIFIED AS RECURRENT: ICD-10-CM

## 2025-05-09 DIAGNOSIS — N95.1 MENOPAUSAL AND FEMALE CLIMACTERIC STATES: ICD-10-CM

## 2025-05-09 DIAGNOSIS — Z29.9 ENCOUNTER FOR PROPHYLACTIC MEASURES, UNSPECIFIED: ICD-10-CM

## 2025-05-09 LAB
ANION GAP SERPL CALC-SCNC: 15 MMOL/L — SIGNIFICANT CHANGE UP (ref 5–17)
APPEARANCE UR: CLEAR — SIGNIFICANT CHANGE UP
BASOPHILS # BLD AUTO: 0 K/UL — SIGNIFICANT CHANGE UP (ref 0–0.2)
BASOPHILS NFR BLD AUTO: 0 % — SIGNIFICANT CHANGE UP (ref 0–2)
BILIRUB UR-MCNC: NEGATIVE — SIGNIFICANT CHANGE UP
BUN SERPL-MCNC: 14 MG/DL — SIGNIFICANT CHANGE UP (ref 7–23)
CALCIUM SERPL-MCNC: 9.7 MG/DL — SIGNIFICANT CHANGE UP (ref 8.4–10.5)
CHLORIDE SERPL-SCNC: 101 MMOL/L — SIGNIFICANT CHANGE UP (ref 96–108)
CO2 SERPL-SCNC: 24 MMOL/L — SIGNIFICANT CHANGE UP (ref 22–31)
COLOR SPEC: YELLOW — SIGNIFICANT CHANGE UP
CREAT SERPL-MCNC: 0.72 MG/DL — SIGNIFICANT CHANGE UP (ref 0.5–1.3)
DIFF PNL FLD: ABNORMAL
EGFR: 88 ML/MIN/1.73M2 — SIGNIFICANT CHANGE UP
EGFR: 88 ML/MIN/1.73M2 — SIGNIFICANT CHANGE UP
EOSINOPHIL # BLD AUTO: 0 K/UL — SIGNIFICANT CHANGE UP (ref 0–0.5)
EOSINOPHIL NFR BLD AUTO: 0 % — SIGNIFICANT CHANGE UP (ref 0–6)
GI PCR PANEL: SIGNIFICANT CHANGE UP
GLUCOSE SERPL-MCNC: 132 MG/DL — HIGH (ref 70–99)
GLUCOSE UR QL: NEGATIVE MG/DL — SIGNIFICANT CHANGE UP
HCT VFR BLD CALC: 38.8 % — SIGNIFICANT CHANGE UP (ref 34.5–45)
HGB BLD-MCNC: 13.2 G/DL — SIGNIFICANT CHANGE UP (ref 11.5–15.5)
KETONES UR-MCNC: 15 MG/DL
LACTATE SERPL-SCNC: 1.2 MMOL/L — SIGNIFICANT CHANGE UP (ref 0.5–2)
LEUKOCYTE ESTERASE UR-ACNC: NEGATIVE — SIGNIFICANT CHANGE UP
LIDOCAIN IGE QN: 22 U/L — SIGNIFICANT CHANGE UP (ref 7–60)
LYMPHOCYTES # BLD AUTO: 1.17 K/UL — SIGNIFICANT CHANGE UP (ref 1–3.3)
LYMPHOCYTES # BLD AUTO: 6.1 % — LOW (ref 13–44)
MAGNESIUM SERPL-MCNC: 1.8 MG/DL — SIGNIFICANT CHANGE UP (ref 1.6–2.6)
MCHC RBC-ENTMCNC: 30.1 PG — SIGNIFICANT CHANGE UP (ref 27–34)
MCHC RBC-ENTMCNC: 34 G/DL — SIGNIFICANT CHANGE UP (ref 32–36)
MCV RBC AUTO: 88.4 FL — SIGNIFICANT CHANGE UP (ref 80–100)
MONOCYTES # BLD AUTO: 1.17 K/UL — HIGH (ref 0–0.9)
MONOCYTES NFR BLD AUTO: 6.1 % — SIGNIFICANT CHANGE UP (ref 2–14)
NEUTROPHILS # BLD AUTO: 16.88 K/UL — HIGH (ref 1.8–7.4)
NEUTROPHILS NFR BLD AUTO: 87.8 % — HIGH (ref 43–77)
NITRITE UR-MCNC: NEGATIVE — SIGNIFICANT CHANGE UP
PH UR: 7 — SIGNIFICANT CHANGE UP (ref 5–8)
PLATELET # BLD AUTO: 208 K/UL — SIGNIFICANT CHANGE UP (ref 150–400)
POTASSIUM SERPL-MCNC: 3.8 MMOL/L — SIGNIFICANT CHANGE UP (ref 3.5–5.3)
POTASSIUM SERPL-SCNC: 3.8 MMOL/L — SIGNIFICANT CHANGE UP (ref 3.5–5.3)
PROT UR-MCNC: NEGATIVE MG/DL — SIGNIFICANT CHANGE UP
RBC # BLD: 4.39 M/UL — SIGNIFICANT CHANGE UP (ref 3.8–5.2)
RBC # FLD: 13.7 % — SIGNIFICANT CHANGE UP (ref 10.3–14.5)
SODIUM SERPL-SCNC: 140 MMOL/L — SIGNIFICANT CHANGE UP (ref 135–145)
SP GR SPEC: 1.01 — SIGNIFICANT CHANGE UP (ref 1–1.03)
UROBILINOGEN FLD QL: 0.2 MG/DL — SIGNIFICANT CHANGE UP (ref 0.2–1)
WBC # BLD: 19.23 K/UL — HIGH (ref 3.8–10.5)
WBC # FLD AUTO: 19.23 K/UL — HIGH (ref 3.8–10.5)

## 2025-05-09 PROCEDURE — 74176 CT ABD & PELVIS W/O CONTRAST: CPT | Mod: 26

## 2025-05-09 PROCEDURE — 93010 ELECTROCARDIOGRAM REPORT: CPT

## 2025-05-09 PROCEDURE — 99223 1ST HOSP IP/OBS HIGH 75: CPT | Mod: GC

## 2025-05-09 PROCEDURE — 99285 EMERGENCY DEPT VISIT HI MDM: CPT | Mod: FS

## 2025-05-09 RX ORDER — MELATONIN 5 MG
3 TABLET ORAL AT BEDTIME
Refills: 0 | Status: DISCONTINUED | OUTPATIENT
Start: 2025-05-09 | End: 2025-05-12

## 2025-05-09 RX ORDER — METRONIDAZOLE 250 MG
500 TABLET ORAL ONCE
Refills: 0 | Status: COMPLETED | OUTPATIENT
Start: 2025-05-09 | End: 2025-05-09

## 2025-05-09 RX ORDER — IOHEXOL 350 MG/ML
30 INJECTION, SOLUTION INTRAVENOUS ONCE
Refills: 0 | Status: COMPLETED | OUTPATIENT
Start: 2025-05-09 | End: 2025-05-09

## 2025-05-09 RX ORDER — ACETAMINOPHEN 500 MG/5ML
725 LIQUID (ML) ORAL ONCE
Refills: 0 | Status: COMPLETED | OUTPATIENT
Start: 2025-05-09 | End: 2025-05-09

## 2025-05-09 RX ORDER — ALPRAZOLAM 0.5 MG
0.12 TABLET, EXTENDED RELEASE 24 HR ORAL AT BEDTIME
Refills: 0 | Status: DISCONTINUED | OUTPATIENT
Start: 2025-05-09 | End: 2025-05-12

## 2025-05-09 RX ORDER — ONDANSETRON HCL/PF 4 MG/2 ML
4 VIAL (ML) INJECTION ONCE
Refills: 0 | Status: COMPLETED | OUTPATIENT
Start: 2025-05-09 | End: 2025-05-09

## 2025-05-09 RX ORDER — VANCOMYCIN HCL IN 5 % DEXTROSE 1.5G/250ML
125 PLASTIC BAG, INJECTION (ML) INTRAVENOUS EVERY 6 HOURS
Refills: 0 | Status: DISCONTINUED | OUTPATIENT
Start: 2025-05-09 | End: 2025-05-12

## 2025-05-09 RX ORDER — ONDANSETRON HCL/PF 4 MG/2 ML
4 VIAL (ML) INJECTION EVERY 8 HOURS
Refills: 0 | Status: DISCONTINUED | OUTPATIENT
Start: 2025-05-09 | End: 2025-05-12

## 2025-05-09 RX ORDER — ACETAMINOPHEN 500 MG/5ML
650 LIQUID (ML) ORAL EVERY 6 HOURS
Refills: 0 | Status: DISCONTINUED | OUTPATIENT
Start: 2025-05-09 | End: 2025-05-12

## 2025-05-09 RX ORDER — CIPROFLOXACIN HCL 250 MG
400 TABLET ORAL ONCE
Refills: 0 | Status: COMPLETED | OUTPATIENT
Start: 2025-05-09 | End: 2025-05-09

## 2025-05-09 RX ORDER — MAGNESIUM, ALUMINUM HYDROXIDE 200-200 MG
30 TABLET,CHEWABLE ORAL EVERY 4 HOURS
Refills: 0 | Status: DISCONTINUED | OUTPATIENT
Start: 2025-05-09 | End: 2025-05-12

## 2025-05-09 RX ADMIN — Medication 100 MILLIGRAM(S): at 06:52

## 2025-05-09 RX ADMIN — Medication 1000 MILLILITER(S): at 05:26

## 2025-05-09 RX ADMIN — IOHEXOL 30 MILLILITER(S): 350 INJECTION, SOLUTION INTRAVENOUS at 05:55

## 2025-05-09 RX ADMIN — Medication 4 MILLIGRAM(S): at 05:26

## 2025-05-09 RX ADMIN — Medication 80 MILLILITER(S): at 22:58

## 2025-05-09 RX ADMIN — Medication 30 MILLILITER(S): at 18:55

## 2025-05-09 RX ADMIN — Medication 1000 MILLILITER(S): at 06:28

## 2025-05-09 RX ADMIN — Medication 125 MILLIGRAM(S): at 14:20

## 2025-05-09 RX ADMIN — Medication 290 MILLIGRAM(S): at 06:28

## 2025-05-09 RX ADMIN — Medication 200 MILLIGRAM(S): at 07:25

## 2025-05-09 RX ADMIN — Medication 125 MILLIGRAM(S): at 18:54

## 2025-05-09 RX ADMIN — Medication 4 MILLIGRAM(S): at 21:45

## 2025-05-09 NOTE — H&P ADULT - PROBLEM SELECTOR PLAN 2
Plan:  F: s/p 2L NS in the ED   E: replete K<4, Mg<2  N: [ ]   VTE Prophylaxis: [ ]   GI: home protonix  C: Full Code  D: [facility] Pt post menopausal for which she takes estrace 0.5mg qd, progesterone 20mg every 3 days. Patient will have her  bring in home medications.  - restart home meds once  brings to the hospital

## 2025-05-09 NOTE — ED ADULT TRIAGE NOTE - STATUS:
[employer] Counseling:  I discussed with the patient the risks of mycophenolate mofetil including but not limited to infection/immunosuppression, GI upset, hypokalemia, hypercholesterolemia, bone marrow suppression, lymphoproliferative disorders, malignancy, GI ulceration/bleed/perforation, colitis, interstitial lung disease, kidney failure, progressive multifocal leukoencephalopathy, and birth defects.  The patient understands that monitoring is required including a baseline creatinine and regular CBC testing. In addition, patient must alert us immediately if symptoms of infection or other concerning signs are noted.

## 2025-05-09 NOTE — H&P ADULT - ATTENDING COMMENTS
Ms. Alie Houser is a 75/F with history of diverticulitis, ischemic colitis (2016), history of adenoidectomy, hysteroscopy who was admitted last month under surgery for suspected ischemic vs infectious colitis and finished a course of Ceftriaxone and Flagyl who presented with persistent mucoid and nonbloody diarrhea that started 4 days prior to admission and worsened about 2 days ago associated with generalized crampy abdominal pain and nausea without vomiting. She had an episode of fever last night. She moved her bowels more than 20 times last night. She denies previous C diff infection. She follows up with a gastroenterologist and is scheduled to do a colonoscopy in June 2025. On examination she is alert and oriented x 3 and not in distress, NC/AT, EOMs intact, moist oral mucosa, supple neck, clear breath sounds, NRRR with normal S1 and S2, abdomen  soft and nondistended with bowel sounds present with minimal tenderness on the left lower side, no guarding, no leg edema or cyanosis and nonfocal neuro exam. Labs and other studies reviewed. WBC 19 GI PCR negative, CTAP with no acute abnormalities, Cr 0.72.    Severe Clostridium difficile colitis    Start PO Vancomycin at 125 mg q6h x 10 days. Full liquid diet and advance as tolerated. Isolation precautions per protocol. Provide IVF if without adequate PO intake. PRN Zofran for nausea. Monitor electrolytes and renal function, Monitor temp and WBC trend. Ambulatory. Patient properly advised and educated.

## 2025-05-09 NOTE — ED PROVIDER NOTE - OBJECTIVE STATEMENT
76 yo female pt with PMH of diverticulitis, ischemic colitis (2016), PSx of adenoidectomy, hysteroscopy,  presents  to ER due to LLQ pain and mucous diarrhea  for past 24 hours. Pt states  feels the pain is similar to pain from prior ischemic colitis episodes. Pt also c/o nausea, no vomiting, + fever, +chills. Pt reports she had numerous  loose stools since yesterday, and a few small fresh blood specs in the toilet bowl.  Pt took Bentyl for pain w/o any improvement.

## 2025-05-09 NOTE — ED ADULT NURSE NOTE - BEFORE THE ILLNESS OR INJURY
PAST MEDICAL HISTORY:  COPD, mild     Dermatomyositis     Dysphagia Has G-tube    Gout     Hypothyroid     Scleredema
No

## 2025-05-09 NOTE — CONSULT NOTE ADULT - ASSESSMENT
74yo  w/ PMH diverticulitis, ischemic colitis (2016, recent admit to [facility] 4/6/25-4/10/25 under Dr. Isaac) and H adenoidectomy, hysteroscopy pw 3d hx of worsening diarrhea and a 1d hx of nausea. Pt states that 3d prior to presentation she began having multiple loose, mucus-mixed nonbloody stools per day. On day of presentation she had reportedly >25 mucus-mixed nonbloody loose stools and nausea. Patient with subjective fever/chills, nausea w/out emesis. Labs notable for WBC 19.2, but otherwise labs including GI PCR WNL. CT AP showing no acute abnormality in the abdomen and pelvis compared to CTAP done on prior admission on 4/6/25 that showed thickening and inflammatory changes in the descending colon and sigmoid colon suspicious for colitis. Patient with non bloody diarrhea. Low concern for recurrent ischemic colitis. Continue to work up infectious etiologies.     No acute surgical intervention or admission to surgery warranted at this time  Evaluation by medicine  GI C/S  PCR for C. Diff  Avoid hypotension, c/w with fluid resuscitation, maintain perfusion pressures   Team 4c will continue to follow

## 2025-05-09 NOTE — H&P ADULT - ASSESSMENT
74yo  w/ PMH diverticulitis, ischemic colitis (2016, recent admit to [facility] 4/6/25-4/10/25 under Dr. Isaac) and PSH adenoidectomy, hysteroscopy pw 3d hx of worsening diarrhea admitted for colitis. 76yo  w/ PMH diverticulitis, ischemic colitis presenting with 3d hx of worsening diarrhea admitted for severe C diff colitis.

## 2025-05-09 NOTE — H&P ADULT - PROBLEM SELECTOR PLAN 1
Pt presenting with severe nonbloody diarrea, found to be (+) for c. diff on stool testing. Meets criteria for severe c. diff (WBC >15k). CTAP showing no acute abnormality in the abdomen and pelvis including megacolon, pt hemodynamically stable, no evidence of fulminant c. diff.  - vancomycin  mg q6 x 10 days Pt presenting with severe nonbloody diarrea, found to be (+) for c. diff on stool testing. Meets criteria for severe c. diff (WBC >15k). CTAP showing no acute abnormality in the abdomen and pelvis including megacolon, pt hemodynamically stable, no evidence of fulminant c. diff. Was admitted mid-april and was on ctx/flagyl during episode of ischemic colitis.  - hold home protonix  - vancomycin  mg q6 x 10 days  - encourage PO intake  - tylenol and zofran PRN for pain/nausea  - pt will reach out to GI to move up her colonoscopy which was initially scheduled for June   - baseline EKG for qtc

## 2025-05-09 NOTE — PATIENT PROFILE ADULT - FUNCTIONAL ASSESSMENT - BASIC MOBILITY 1.
[facility] will cover. Patient gave me her new [employer] number ([phone]). Patient stated she has the [facility] plan. Placed a call to Keyshawn Raya with [facility] [facility].   Patient has an eye appointment in July and she will call his office to see if she needs a referral.
4 = No assist / stand by assistance

## 2025-05-09 NOTE — ED ADULT NURSE NOTE - NSFALLUNIVINTERV_ED_ALL_ED
Bed/Stretcher in lowest position, wheels locked, appropriate side rails in place/Call bell, personal items and telephone in reach/Instruct patient to call for assistance before getting out of bed/chair/stretcher/Non-slip footwear applied when patient is off stretcher/[place] to call system/Physically safe environment - no spills, clutter or unnecessary equipment/Purposeful proactive rounding/Room/bathroom lighting operational, light cord in reach

## 2025-05-09 NOTE — CONSULT NOTE ADULT - SUBJECTIVE AND OBJECTIVE BOX
76yo  w/ PMH diverticulitis, ischemic colitis (2016, recent admit to [facility] 25-4/10/25 under Dr. Isaac) and PSH adenoidectomy, hysteroscopy pw 3d hx of worsening diarrhea and a 1d hx of nausea. Pt states that 3d prior to presentation she began having multiple loose, mucus-mixed nonbloody stools per day. On day of presentation she had reportedly >25 mucus-mixed nonbloody loose stools and nausea. Pt states that she has had some associated mild LLQ discomfort. States she had subjective fever/chills, nausea w/out emesis. Pt states that the mild LLQ discomfort is similar to her presentation on 25 however she has not noticed any blood in her stool throughout her current illness presentation. Pt saw Dr. Isaac in mid-April 1w post discharge and felt well at the time.     Patient reports having a colonoscopy last in  that revealed polyps that were removed but otherwise normal. Last EGD was a year ago and it revealed a Schatzki ring. Denies personal and family history of UC/Crohn's. Denies history of colon cancer. Reports family history of colon cancer in her father and pancreatic cancer in her grandfather.       In the ED, vitals afebrile, tachy 118, hypertensive SBP 170s  Pt received 1L NS bolus x1   Labs sx for WBC 19.2, but otherwise WNL. GI PCR was negative.   CTAP showing no acute abnormality in the abdomen and pelvis compared to CTAP done on prior admission on 25 that showed thickening and inflammatory changes in the descending colon and sigmoid colon suspicious for colitis.      PMH: Diverticulitis; Ischemic colitis   PSHx: adenoidectomy; hystereoscopy   Medications: 0.5mg Estrogen daily and 100mg Progesterone - every 3rd day (for post menopausal); ASA 81 q3 days   Allergies: NKDA; IV contrast allergy (hives)  Social Hx: quit smoking >20 years ago   Family Hx: Denies family hx of IBS, Crohn's, UC. Colon cancer in her father. Pancreatic cancer in grandfather  Last colonoscopy:  s/p polypectomy but otherwise normal  Last EGD: 1 year ago, revealed Schatzki ring        Vital Signs Last 24 Hrs  T(C): 36.7 (09 May 2025 09:03), Max: 37.6 (09 May 2025 04:41)  T(F): 98 (09 May 2025 09:03), Max: 99.6 (09 May 2025 04:41)  HR: 87 (09 May 2025 09:03) (87 - 118)  BP: 130/74 (09 May 2025 09:03) (130/74 - 170/90)  BP(mean): --  RR: 18 (09 May 2025 09:03) (18 - 18)  SpO2: 98% (09 May 2025 09:03) (95% - 98%)    Parameters below as of 09 May 2025 09:03  Patient On (Oxygen Delivery Method): room air        PHYSICAL EXAM:   GENERAL: NAD, lying in bed comfortably  CV: NSR  Pulm: No shortness of breath or increased work of breathing  ABDOMEN: soft, mildly ttp LLQ/RLQ, mildly distended, no rebound or guarding  EXTREMITIES: WWP, No clubbing, cyanosis, or edema  NERVOUS SYSTEM: No focal deficits   SKIN: No rashes or lesions      LABS:                        13.2   19.23 )-----------( 208      ( 09 May 2025 05:27 )             38.8     05-09    140  |  101  |  14  ----------------------------<  132[H]  3.8   |  24  |  0.72    Ca    9.7      09 May 2025 05:27  Mg     1.8     05-09              CAPILLARY BLOOD GLUCOSE        Urinalysis Basic - ( 09 May 2025 06:30 )    Color: Yellow / Appearance: Clear / S.006 / pH: x  Gluc: x / Ketone: 15 mg/dL  / Bili: Negative / Urobili: 0.2 mg/dL   Blood: x / Protein: Negative mg/dL / Nitrite: Negative   Leuk Esterase: Negative / RBC: 3 /HPF / WBC 1 /HPF   Sq Epi: x / Non Sq Epi: 2 /HPF / Bacteria: Negative /HPF        Urinalysis with Rflx Culture (collected 09 May 2025 06:30)

## 2025-05-09 NOTE — H&P ADULT - HISTORY OF PRESENT ILLNESS
74yo  w/ PMH diverticulitis, ischemic colitis (2016, recent admit to [facility] 4/6/25-4/10/25 under Dr. Isaac) and PSH adenoidectomy, hysteroscopy pw 3d hx of worsening diarrhea and a 1d hx of nausea. Pt states that 3d prior to presentation she began having multiple loose, mucus-mixed nonbloody stools per day. On day of presentation she had reportedly >25 mucus-mixed nonbloody loose stools and nausea. Pt states that she has had some associated mild LLQ discomfort. States she had subjective fever/chills, nausea w/out emesis. Pt states that the mild LLQ discomfort is similar to her presentation on 4/6/25 however she has not noticed any blood in her stool throughout her current illness presentation. Pt saw Dr. Isaac in mid-April 1w post discharge and felt well at the time. Patient reports having a colonoscopy last in 2022 that revealed polyps that were removed but otherwise normal. Last EGD was a year ago and it revealed a Schatzki ring. Denies personal and family history of UC/Crohn's. Denies history of colon cancer. Reports family history of colon cancer in her father and pancreatic cancer in her grandfather.       In the ED, vitals afebrile, tachy 118, hypertensive SBP 170s  Pt received 1L NS bolus x1   Labs sx for WBC 19.2, but otherwise WNL. GI PCR was negative.   CTAP showing no acute abnormality in the abdomen and pelvis compared to CTAP done on prior admission on 4/6/25 that showed thickening and inflammatory changes in the descending colon and sigmoid colon suspicious for colitis.      PMH: Diverticulitis; Ischemic colitis   PSHx: adenoidectomy; hystereoscopy   Medications: 0.5mg Estrogen daily and 100mg Progesterone - every 3rd day (for post menopausal); ASA 81 q3 days   Allergies: NKDA; IV contrast allergy (hives)  Social Hx: quit smoking >20 years ago   Family Hx: Denies family hx of IBS, Crohn's, UC. Colon cancer in her father. Pancreatic cancer in grandfather  Last colonoscopy: 2022 s/p polypectomy but otherwise normal   76yo  w/ PMH diverticulitis, ischemic colitis (2016, recent admit to [facility] 4/6/25-4/10/25 under Dr. Isaac) and H adenoidectomy, hysteroscopy pw 3d hx of worsening diarrhea and a 1d hx of nausea. Pt states that 3d prior to presentation she began having multiple loose, mucus-mixed nonbloody stools per day. On day of presentation she had reportedly >25 mucus-mixed nonbloody loose stools and nausea. Pt states that she has had some associated mild LLQ discomfort. States she had subjective fever/chills, nausea w/out emesis. Pt states that the mild LLQ discomfort is similar to her presentation on 4/6/25 however she has not noticed any blood in her stool throughout her current illness presentation. Pt saw Dr. Isaac in mid-April 1w post discharge and felt well at the time. Patient reports having a colonoscopy last in 2022 that revealed polyps that were removed but otherwise normal. Last EGD was a year ago and it revealed a Schatzki ring. Denies personal and family history of UC/Crohn's. Denies history of colon cancer. Reports family history of colon cancer in her father and pancreatic cancer in her grandfather.       ED course:   VS:  99.6F, 70.90, , RR 18, 95% RA  Labs: WBC 19.2, but otherwise WNL  GI PCR: negative  C diff: positive for antigen and toxin  CTAP showing no acute abnormality in the abdomen and pelvis compared to CTAP done on prior admission on 4/6/25 that showed thickening and inflammatory changes in the descending colon and sigmoid colon suspicious for colitis.  Orders: cipro x1, flagyl x1, 2L NS, tylenol, zofran     74 y/o w/ PMH diverticulitis, ischemic colitis (2016, recent admit to [facility] 4/6/25-4/10/25 under Dr. Isaac) pw 3d hx of worsening diarrhea and a 1d hx of nausea. Pt states that 3d prior to presentation she began having multiple loose, mucus-mixed nonbloody stools per day. On day of presentation she had reportedly >25 mucus-mixed nonbloody loose stools and nausea. Pt states that she has had some associated mild LLQ discomfort. States she had subjective fever/chills, nausea w/out emesis. Pt states that the mild LLQ discomfort is similar to her presentation on 4/6/25 however she has not noticed any blood in her stool throughout her current illness presentation. Pt saw Dr. Isaac in mid-April 1w post discharge and felt well at the time. Patient reports having a colonoscopy last in 2022 that revealed polyps that were removed but otherwise normal. Last EGD was a year ago and it revealed a Schatzki ring. Denies personal and family history of UC/Crohn's. Denies history of colon cancer. Reports family history of colon cancer in her father and pancreatic cancer in her grandfather.       ED course:   VS: 99.6F, 170/90, , RR 18, 95% RA  Labs: WBC 19.2, but otherwise WNL  GI PCR: negative  C diff: positive for antigen and toxin  CTAP showing no acute abnormality in the abdomen and pelvis compared to CTAP done on prior admission on 4/6/25 that showed thickening and inflammatory changes in the descending colon and sigmoid colon suspicious for colitis.  Orders: cipro x1, flagyl x1, 2L NS, tylenol, zofran

## 2025-05-09 NOTE — H&P ADULT - NSHPLABSRESULTS_GEN_ALL_CORE
.  LABS:                         13.2   19.23 )-----------( 208      ( 09 May 2025 05:27 )             38.8         140  |  101  |  14  ----------------------------<  132[H]  3.8   |  24  |  0.72    Ca    9.7      09 May 2025 05:27  Mg     1.8             Urinalysis Basic - ( 09 May 2025 06:30 )    Color: Yellow / Appearance: Clear / S.006 / pH: x  Gluc: x / Ketone: 15 mg/dL  / Bili: Negative / Urobili: 0.2 mg/dL   Blood: x / Protein: Negative mg/dL / Nitrite: Negative   Leuk Esterase: Negative / RBC: 3 /HPF / WBC 1 /HPF   Sq Epi: x / Non Sq Epi: 2 /HPF / Bacteria: Negative /HPF            Lactate, Blood: 1.2 mmol/L ( @ 05:27)      RADIOLOGY, EKG & ADDITIONAL TESTS: Reviewed.

## 2025-05-09 NOTE — H&P ADULT - PROBLEM SELECTOR PLAN 3
Plan:  F: s/p 2L NS in the ED   E: replete K<4, Mg<2  N: full liquid diet, advance as tolerated  VTE Prophylaxis: none, pt ambulatory   GI: hold home protonix  C: Full Code  D: F

## 2025-05-09 NOTE — ED PROVIDER NOTE - CLINICAL SUMMARY MEDICAL DECISION MAKING FREE TEXT BOX
74 yo female pt with PMH of diverticulitis, ischemic colitis (2016), PSx of adenoidectomy, hysteroscopy,  presents  to ER due to LLQ pain and mucous diarrhea  for past 24 hours. Pt states  feels the pain is similar to pain from prior ischemic colitis episodes. Pt also c/o nausea, no vomiting, + fever, +chills. Pt reports she had numerous  loose stools since yesterday, and a few small fresh blood specs in the toilet bowl.  Pt took Bentyl for pain w/o any improvement.  pt afebrile in the triage, has clear lungs, non distended , soft abdomen with significant TTP to LLQ, RLQ. plan for labs, IVF, antiemetics and pain control, imaging, surgery consult.

## 2025-05-09 NOTE — H&P ADULT - NSHPPHYSICALEXAM_GEN_ALL_CORE
.  VITAL SIGNS:  T(F): 98 (05-09-25 @ 09:03), Max: 99.6 (05-09-25 @ 04:41)  HR: 87 (05-09-25 @ 09:03) (87 - 118)  BP: 130/74 (05-09-25 @ 09:03) (130/74 - 170/90)  BP(mean): --  RR: 18 (05-09-25 @ 09:03) (18 - 18)  SpO2: 98% (05-09-25 @ 09:03) (95% - 98%)    PHYSICAL EXAM:  Constitutional: resting comfortably in bed; NAD  HEENT: NC/AT, PERRL, EOMI, anicteric sclera, MMM  Neck: supple; no JVD or thyromegaly  Respiratory: unlabored breathing, CTA B/L; no Rhonchi/Crackles, no retractions or use of accessory muscles   Cardiac: +S1/S2; RRR; no M/R/G  Gastrointestinal: soft, NT/ND; no rebound or guarding; +BSx4  Extremities: WWP, no clubbing or cyanosis; no peripheral edema  Musculoskeletal: NROM x4; no joint swelling, tenderness or erythema  Vascular: 2+ radial, DP/PT pulses B/L  Dermatologic: skin warm, dry and intact; no rashes, wounds, or scars  Neurologic: AAOx3; CNII-XII grossly intact; no focal deficits  Psychiatric: affect and characteristics of appearance, verbalizations, behaviors are appropriate, denies SI/HI/AH/VH .  VITAL SIGNS:  T(F): 98 (05-09-25 @ 09:03), Max: 99.6 (05-09-25 @ 04:41)  HR: 87 (05-09-25 @ 09:03) (87 - 118)  BP: 130/74 (05-09-25 @ 09:03) (130/74 - 170/90)  BP(mean): --  RR: 18 (05-09-25 @ 09:03) (18 - 18)  SpO2: 98% (05-09-25 @ 09:03) (95% - 98%)    PHYSICAL EXAM:  Constitutional: resting comfortably in bed; NAD  HEENT: NC/AT, PERRL, EOMI, anicteric sclera, MMM  Neck: supple; no JVD or thyromegaly  Respiratory: unlabored breathing, CTA B/L; no Rhonchi/Crackles, no retractions or use of accessory muscles   Cardiac: +S1/S2; RRR; no M/R/G  Gastrointestinal: soft, mildly tender, no rebound or guarding; +BSx4  Extremities: WWP, no clubbing or cyanosis; no peripheral edema  Musculoskeletal: NROM x4; no joint swelling, tenderness or erythema  Vascular: 2+ radial, DP/PT pulses B/L  Dermatologic: skin warm, dry and intact; no rashes, wounds, or scars  Neurologic: AAOx3; CNII-XII grossly intact; no focal deficits  Psychiatric: affect and characteristics of appearance, verbalizations, behaviors are appropriate, denies SI/HI/AH/VH

## 2025-05-09 NOTE — ED ADULT NURSE NOTE - NSFALLRISKASMTTYPE_ED_ALL_ED
January 15, 2024     Patient: Tye Hendrickson   YOB: 1962   Date of Visit: 1/15/2024       To Whom It May Concern:    Tye Hendrickson was seen in my clinic on 1/15/2024 at 7:30 am. Please excuse Tye for his absence from school on this day to make the appointment.    If you have any questions or concerns, please don't hesitate to call.         Sincerely,         GOVIND AMBROSIO        CC: No Recipients   Initial (On Arrival)

## 2025-05-09 NOTE — ED PROVIDER NOTE - ATTENDING APP SHARED VISIT CONTRIBUTION OF CARE
75F hx ischemic colitis, diverticulitis, c/o abd pain and diarrhea. +nausea, no vomiting. +fevers. was admitted in april for colitis.   heent- ncat  cv -rrr  lungs -ctab  abd - soft, LLQ, RUQ ttp  ext -wwp  neuro -aox3, steady gait, polo  abd pain, nausea, diarrhea, elevated WBC, lactate WNL. pending CT a/p

## 2025-05-09 NOTE — ED ADULT NURSE NOTE - HISTORY OF COVID-19 VACCINATION
[FreeTextEntry6] : PATIENT CAME TO OFFICE WITH LEFT RED EYE NO FEVER NO URI NO V/D NO RASHES X1 DAY 
Vaccine status unknown

## 2025-05-09 NOTE — ED ADULT NURSE NOTE - OBJECTIVE STATEMENT
Pt is a 75 year old female came in complaining of fever, chills, vomiting and diarrhea. Known hx of colitis.    Upon ed arrival, pt is awake, alert, orientedx4 and ambulatory.

## 2025-05-10 DIAGNOSIS — E87.6 HYPOKALEMIA: ICD-10-CM

## 2025-05-10 DIAGNOSIS — A41.9 SEPSIS, UNSPECIFIED ORGANISM: ICD-10-CM

## 2025-05-10 LAB
ANION GAP SERPL CALC-SCNC: 11 MMOL/L — SIGNIFICANT CHANGE UP (ref 5–17)
ANION GAP SERPL CALC-SCNC: 13 MMOL/L — SIGNIFICANT CHANGE UP (ref 5–17)
BUN SERPL-MCNC: 6 MG/DL — LOW (ref 7–23)
BUN SERPL-MCNC: 6 MG/DL — LOW (ref 7–23)
CALCIUM SERPL-MCNC: 8.9 MG/DL — SIGNIFICANT CHANGE UP (ref 8.4–10.5)
CALCIUM SERPL-MCNC: 9.5 MG/DL — SIGNIFICANT CHANGE UP (ref 8.4–10.5)
CHLORIDE SERPL-SCNC: 100 MMOL/L — SIGNIFICANT CHANGE UP (ref 96–108)
CHLORIDE SERPL-SCNC: 105 MMOL/L — SIGNIFICANT CHANGE UP (ref 96–108)
CO2 SERPL-SCNC: 23 MMOL/L — SIGNIFICANT CHANGE UP (ref 22–31)
CO2 SERPL-SCNC: 25 MMOL/L — SIGNIFICANT CHANGE UP (ref 22–31)
CREAT SERPL-MCNC: 0.64 MG/DL — SIGNIFICANT CHANGE UP (ref 0.5–1.3)
CREAT SERPL-MCNC: 0.7 MG/DL — SIGNIFICANT CHANGE UP (ref 0.5–1.3)
EGFR: 91 ML/MIN/1.73M2 — SIGNIFICANT CHANGE UP
EGFR: 91 ML/MIN/1.73M2 — SIGNIFICANT CHANGE UP
EGFR: 93 ML/MIN/1.73M2 — SIGNIFICANT CHANGE UP
EGFR: 93 ML/MIN/1.73M2 — SIGNIFICANT CHANGE UP
GLUCOSE SERPL-MCNC: 103 MG/DL — HIGH (ref 70–99)
GLUCOSE SERPL-MCNC: 108 MG/DL — HIGH (ref 70–99)
HCT VFR BLD CALC: 38.1 % — SIGNIFICANT CHANGE UP (ref 34.5–45)
HGB BLD-MCNC: 12.4 G/DL — SIGNIFICANT CHANGE UP (ref 11.5–15.5)
MAGNESIUM SERPL-MCNC: 1.8 MG/DL — SIGNIFICANT CHANGE UP (ref 1.6–2.6)
MCHC RBC-ENTMCNC: 29.9 PG — SIGNIFICANT CHANGE UP (ref 27–34)
MCHC RBC-ENTMCNC: 32.5 G/DL — SIGNIFICANT CHANGE UP (ref 32–36)
MCV RBC AUTO: 91.8 FL — SIGNIFICANT CHANGE UP (ref 80–100)
NRBC BLD AUTO-RTO: 0 /100 WBCS — SIGNIFICANT CHANGE UP (ref 0–0)
PHOSPHATE SERPL-MCNC: 2.6 MG/DL — SIGNIFICANT CHANGE UP (ref 2.5–4.5)
PLATELET # BLD AUTO: 193 K/UL — SIGNIFICANT CHANGE UP (ref 150–400)
POTASSIUM SERPL-MCNC: 3.4 MMOL/L — LOW (ref 3.5–5.3)
POTASSIUM SERPL-MCNC: 3.7 MMOL/L — SIGNIFICANT CHANGE UP (ref 3.5–5.3)
POTASSIUM SERPL-SCNC: 3.4 MMOL/L — LOW (ref 3.5–5.3)
POTASSIUM SERPL-SCNC: 3.7 MMOL/L — SIGNIFICANT CHANGE UP (ref 3.5–5.3)
RBC # BLD: 4.15 M/UL — SIGNIFICANT CHANGE UP (ref 3.8–5.2)
RBC # FLD: 14.1 % — SIGNIFICANT CHANGE UP (ref 10.3–14.5)
SODIUM SERPL-SCNC: 136 MMOL/L — SIGNIFICANT CHANGE UP (ref 135–145)
SODIUM SERPL-SCNC: 141 MMOL/L — SIGNIFICANT CHANGE UP (ref 135–145)
WBC # BLD: 14.55 K/UL — HIGH (ref 3.8–10.5)
WBC # FLD AUTO: 14.55 K/UL — HIGH (ref 3.8–10.5)

## 2025-05-10 PROCEDURE — 99233 SBSQ HOSP IP/OBS HIGH 50: CPT | Mod: GC

## 2025-05-10 RX ORDER — SOD PHOS DI, MONO/K PHOS MONO 250 MG
1 TABLET ORAL ONCE
Refills: 0 | Status: COMPLETED | OUTPATIENT
Start: 2025-05-10 | End: 2025-05-10

## 2025-05-10 RX ADMIN — Medication 125 MILLIGRAM(S): at 00:20

## 2025-05-10 RX ADMIN — Medication 650 MILLIGRAM(S): at 01:20

## 2025-05-10 RX ADMIN — Medication 1 PACKET(S): at 13:01

## 2025-05-10 RX ADMIN — Medication 125 MILLIGRAM(S): at 17:41

## 2025-05-10 RX ADMIN — Medication 650 MILLIGRAM(S): at 00:20

## 2025-05-10 RX ADMIN — Medication 125 MILLIGRAM(S): at 11:25

## 2025-05-10 RX ADMIN — Medication 125 MILLIGRAM(S): at 23:40

## 2025-05-10 RX ADMIN — Medication 125 MILLIGRAM(S): at 06:30

## 2025-05-10 NOTE — PROGRESS NOTE ADULT - SUBJECTIVE AND OBJECTIVE BOX
Patient is a 75y old  Female who presents with a chief complaint of   INTERVAL EVENTS: CELIA    SUBJECTIVE:  Patient was seen and examined at bedside. Patient reports continued diarrhea. Reports quantity is low but frequency is very high.     Review of systems: Patient denies: fever, chills, dizziness, HA, Changes in vision, CP, dyspnea, nausea or vomiting, dysuria, changes in bowel movements, LE edema. Rest of 12 point Review of systems negative unless otherwise documented elsewhere in note.         MEDICATIONS:  MEDICATIONS  (STANDING):  sodium chloride 0.9%. 1000 milliLiter(s) (80 mL/Hr) IV Continuous <Continuous>  vancomycin    Solution 125 milliGRAM(s) Oral every 6 hours    MEDICATIONS  (PRN):  acetaminophen     Tablet .. 650 milliGRAM(s) Oral every 6 hours PRN Temp greater or equal to 38C (100.4F), Mild Pain (1 - 3)  ALPRAZolam 0.125 milliGRAM(s) Oral at bedtime PRN insomnia  aluminum hydroxide/magnesium hydroxide/simethicone Suspension 30 milliLiter(s) Oral every 4 hours PRN Dyspepsia  melatonin 3 milliGRAM(s) Oral at bedtime PRN Insomnia  ondansetron Injectable 4 milliGRAM(s) IV Push every 8 hours PRN Nausea and/or Vomiting      Allergies    [place] (Vomiting)  Turmeric (Unknown)  sulfa drugs (Swelling)  penicillins (Flushing)  IV Contrast (Other)    Intolerances        OBJECTIVE:  Vital Signs Last 24 Hrs  T(C): 36.3 (10 May 2025 05:57), Max: 37.1 (09 May 2025 21:00)  T(F): 97.4 (10 May 2025 05:57), Max: 98.7 (09 May 2025 21:00)  HR: 80 (10 May 2025 05:57) (80 - 95)  BP: 152/73 (10 May 2025 05:57) (135/74 - 152/73)  BP(mean): --  RR: 18 (10 May 2025 05:57) (18 - 20)  SpO2: 98% (10 May 2025 05:57) (97% - 98%)    Parameters below as of 10 May 2025 05:57  Patient On (Oxygen Delivery Method): room air      I&O's Summary      PHYSICAL EXAM:  Gen: Reclining in bed at time of exam, appears stated age  HEENT: NCAT, MMM, clear OP  Neck: supple, trachea at midline  CV: RRR, +S1/S2  Pulm: adequate respiratory effort, no increase in work of breathing  Abd: soft, NTND  Skin: warm and dry, no new rashes vs prior report  Ext: WWP, no LE edema  Neuro: AOx3, no gross focal neurological deficits  Psych: affect and behavior appropriate, pleasant at time of interview    LABS:                        12.4   14.55 )-----------( 193      ( 10 May 2025 07:51 )             38.1     05-10    141  |  105  |  6[L]  ----------------------------<  108[H]  3.4[L]   |  23  |  0.70    Ca    8.9      10 May 2025 07:51  Phos  2.6     05-10  Mg     1.8     05-10          CAPILLARY BLOOD GLUCOSE      POCT Blood Glucose.: 106 mg/dL (10 May 2025 12:09)    Urinalysis Basic - ( 10 May 2025 07:51 )    Color: x / Appearance: x / SG: x / pH: x  Gluc: 108 mg/dL / Ketone: x  / Bili: x / Urobili: x   Blood: x / Protein: x / Nitrite: x   Leuk Esterase: x / RBC: x / WBC x   Sq Epi: x / Non Sq Epi: x / Bacteria: x        MICRODATA:    Urinalysis with Rflx Culture (collected 09 May 2025 06:30)        RADIOLOGY/OTHER STUDIES:

## 2025-05-10 NOTE — PROGRESS NOTE ADULT - PROBLEM SELECTOR PLAN 4
Pt post menopausal for which she takes estrace 0.5mg qd, progesterone 20mg every 3 days. Patient will have her  bring in home medications.  - restart home meds once  brings to the hospital

## 2025-05-10 NOTE — PROGRESS NOTE ADULT - SUBJECTIVE AND OBJECTIVE BOX
SUBJECTIVE: Pt reports still having multiple BMs along with flatus. Has mild abdominal discomfort. Tolerating diet and ambulating.    MEDICATIONS  (STANDING):  sodium chloride 0.9%. 1000 milliLiter(s) (80 mL/Hr) IV Continuous <Continuous>  vancomycin    Solution 125 milliGRAM(s) Oral every 6 hours    MEDICATIONS  (PRN):  acetaminophen     Tablet .. 650 milliGRAM(s) Oral every 6 hours PRN Temp greater or equal to 38C (100.4F), Mild Pain (1 - 3)  ALPRAZolam 0.125 milliGRAM(s) Oral at bedtime PRN insomnia  aluminum hydroxide/magnesium hydroxide/simethicone Suspension 30 milliLiter(s) Oral every 4 hours PRN Dyspepsia  melatonin 3 milliGRAM(s) Oral at bedtime PRN Insomnia  ondansetron Injectable 4 milliGRAM(s) IV Push every 8 hours PRN Nausea and/or Vomiting      Vital Signs Last 24 Hrs  T(C): 36.6 (10 May 2025 16:00), Max: 37.1 (09 May 2025 21:00)  T(F): 97.8 (10 May 2025 16:00), Max: 98.7 (09 May 2025 21:00)  HR: 91 (10 May 2025 16:00) (80 - 95)  BP: 159/77 (10 May 2025 16:00) (151/69 - 159/77)  BP(mean): --  RR: 18 (10 May 2025 16:00) (18 - 18)  SpO2: 97% (10 May 2025 16:00) (97% - 98%)    Parameters below as of 10 May 2025 16:00  Patient On (Oxygen Delivery Method): room air        PHYSICAL EXAM:    Constitutional: A&Ox3, nad  Respiratory: non labored breathing, no respiratory distress  Cardiovascular: NSR, RRR  Gastrointestinal: abd soft, NT, ND  Genitourinary: voiding  Extremities: (-) edema                  I&O's Detail      LABS:                        12.4   14.55 )-----------( 193      ( 10 May 2025 07:51 )             38.1     05-10    141  |  105  |  6[L]  ----------------------------<  108[H]  3.4[L]   |  23  |  0.70    Ca    8.9      10 May 2025 07:51  Phos  2.6     05-10  Mg     1.8     05-10        Urinalysis Basic - ( 10 May 2025 07:51 )    Color: x / Appearance: x / SG: x / pH: x  Gluc: 108 mg/dL / Ketone: x  / Bili: x / Urobili: x   Blood: x / Protein: x / Nitrite: x   Leuk Esterase: x / RBC: x / WBC x   Sq Epi: x / Non Sq Epi: x / Bacteria: x        RADIOLOGY & ADDITIONAL STUDIES:

## 2025-05-11 LAB
ALBUMIN SERPL ELPH-MCNC: 3.5 G/DL — SIGNIFICANT CHANGE UP (ref 3.3–5)
ALP SERPL-CCNC: 65 U/L — SIGNIFICANT CHANGE UP (ref 40–120)
ALT FLD-CCNC: 14 U/L — SIGNIFICANT CHANGE UP (ref 10–45)
ANION GAP SERPL CALC-SCNC: 9 MMOL/L — SIGNIFICANT CHANGE UP (ref 5–17)
AST SERPL-CCNC: 18 U/L — SIGNIFICANT CHANGE UP (ref 10–40)
BASOPHILS # BLD AUTO: 0.03 K/UL — SIGNIFICANT CHANGE UP (ref 0–0.2)
BASOPHILS NFR BLD AUTO: 0.3 % — SIGNIFICANT CHANGE UP (ref 0–2)
BILIRUB SERPL-MCNC: 0.4 MG/DL — SIGNIFICANT CHANGE UP (ref 0.2–1.2)
BUN SERPL-MCNC: 6 MG/DL — LOW (ref 7–23)
CALCIUM SERPL-MCNC: 8.8 MG/DL — SIGNIFICANT CHANGE UP (ref 8.4–10.5)
CHLORIDE SERPL-SCNC: 104 MMOL/L — SIGNIFICANT CHANGE UP (ref 96–108)
CO2 SERPL-SCNC: 25 MMOL/L — SIGNIFICANT CHANGE UP (ref 22–31)
CREAT SERPL-MCNC: 0.66 MG/DL — SIGNIFICANT CHANGE UP (ref 0.5–1.3)
EGFR: 92 ML/MIN/1.73M2 — SIGNIFICANT CHANGE UP
EGFR: 92 ML/MIN/1.73M2 — SIGNIFICANT CHANGE UP
EOSINOPHIL # BLD AUTO: 0.15 K/UL — SIGNIFICANT CHANGE UP (ref 0–0.5)
EOSINOPHIL NFR BLD AUTO: 1.6 % — SIGNIFICANT CHANGE UP (ref 0–6)
GLUCOSE SERPL-MCNC: 110 MG/DL — HIGH (ref 70–99)
HCT VFR BLD CALC: 33.6 % — LOW (ref 34.5–45)
HGB BLD-MCNC: 11.1 G/DL — LOW (ref 11.5–15.5)
IMM GRANULOCYTES NFR BLD AUTO: 0.3 % — SIGNIFICANT CHANGE UP (ref 0–0.9)
LYMPHOCYTES # BLD AUTO: 1.94 K/UL — SIGNIFICANT CHANGE UP (ref 1–3.3)
LYMPHOCYTES # BLD AUTO: 20.7 % — SIGNIFICANT CHANGE UP (ref 13–44)
MAGNESIUM SERPL-MCNC: 2 MG/DL — SIGNIFICANT CHANGE UP (ref 1.6–2.6)
MCHC RBC-ENTMCNC: 29.8 PG — SIGNIFICANT CHANGE UP (ref 27–34)
MCHC RBC-ENTMCNC: 33 G/DL — SIGNIFICANT CHANGE UP (ref 32–36)
MCV RBC AUTO: 90.1 FL — SIGNIFICANT CHANGE UP (ref 80–100)
MONOCYTES # BLD AUTO: 1.09 K/UL — HIGH (ref 0–0.9)
MONOCYTES NFR BLD AUTO: 11.6 % — SIGNIFICANT CHANGE UP (ref 2–14)
NEUTROPHILS # BLD AUTO: 6.13 K/UL — SIGNIFICANT CHANGE UP (ref 1.8–7.4)
NEUTROPHILS NFR BLD AUTO: 65.5 % — SIGNIFICANT CHANGE UP (ref 43–77)
NRBC BLD AUTO-RTO: 0 /100 WBCS — SIGNIFICANT CHANGE UP (ref 0–0)
PHOSPHATE SERPL-MCNC: 2.3 MG/DL — LOW (ref 2.5–4.5)
PLATELET # BLD AUTO: 179 K/UL — SIGNIFICANT CHANGE UP (ref 150–400)
POTASSIUM SERPL-MCNC: 3.2 MMOL/L — LOW (ref 3.5–5.3)
POTASSIUM SERPL-SCNC: 3.2 MMOL/L — LOW (ref 3.5–5.3)
PROT SERPL-MCNC: 5.6 G/DL — LOW (ref 6–8.3)
RBC # BLD: 3.73 M/UL — LOW (ref 3.8–5.2)
RBC # FLD: 14 % — SIGNIFICANT CHANGE UP (ref 10.3–14.5)
SODIUM SERPL-SCNC: 138 MMOL/L — SIGNIFICANT CHANGE UP (ref 135–145)
WBC # BLD: 9.37 K/UL — SIGNIFICANT CHANGE UP (ref 3.8–10.5)
WBC # FLD AUTO: 9.37 K/UL — SIGNIFICANT CHANGE UP (ref 3.8–10.5)

## 2025-05-11 PROCEDURE — 99232 SBSQ HOSP IP/OBS MODERATE 35: CPT | Mod: GC

## 2025-05-11 RX ORDER — VANCOMYCIN HCL IN 5 % DEXTROSE 1.5G/250ML
1 PLASTIC BAG, INJECTION (ML) INTRAVENOUS
Qty: 28 | Refills: 0
Start: 2025-05-11 | End: 2025-05-17

## 2025-05-11 RX ORDER — SOD PHOS DI, MONO/K PHOS MONO 250 MG
1 TABLET ORAL ONCE
Refills: 0 | Status: COMPLETED | OUTPATIENT
Start: 2025-05-11 | End: 2025-05-11

## 2025-05-11 RX ORDER — SIMETHICONE 80 MG
80 TABLET,CHEWABLE ORAL
Refills: 0 | Status: DISCONTINUED | OUTPATIENT
Start: 2025-05-11 | End: 2025-05-12

## 2025-05-11 RX ADMIN — Medication 125 MILLIGRAM(S): at 18:41

## 2025-05-11 RX ADMIN — Medication 40 MILLIEQUIVALENT(S): at 12:38

## 2025-05-11 RX ADMIN — Medication 125 MILLIGRAM(S): at 06:35

## 2025-05-11 RX ADMIN — Medication 1 PACKET(S): at 12:38

## 2025-05-11 RX ADMIN — Medication 125 MILLIGRAM(S): at 12:38

## 2025-05-11 RX ADMIN — Medication 40 MILLIEQUIVALENT(S): at 16:58

## 2025-05-11 RX ADMIN — Medication 4 MILLIGRAM(S): at 13:06

## 2025-05-11 NOTE — PROGRESS NOTE ADULT - PROBLEM SELECTOR PLAN 4
Pt post menopausal for which she takes estrace 0.5mg qd, progesterone 20mg every 3 days. Patient will have her  bring in home medications.  - restart home meds once  brings to the hospital Pt post menopausal for which she takes estrace 0.5mg qd, progesterone 20mg every 3 days. Patient will have her  bring in home medications.    - restart home meds once  brings to the hospital

## 2025-05-11 NOTE — PROGRESS NOTE ADULT - PROBLEM SELECTOR PLAN 5
Plan:  F: s/p 2L NS in the ED   E: replete K<4, Mg<2  N: full liquid diet, advance as tolerated  VTE Prophylaxis: none, pt ambulatory   GI: hold home protonix  C: Full Code  D: F F: s/p 2L NS in the ED   E: replete K<4, Mg<2  N: full liquid diet, advance as tolerated  VTE Prophylaxis: none, pt ambulatory   GI: hold home protonix  C: Full Code  D: F

## 2025-05-11 NOTE — PROGRESS NOTE ADULT - PROBLEM SELECTOR PLAN 1
Present on admission, HR > 90, WBC > 12, SBP drop > 40 2/2 sepsis  -Infectious source - C- diff RESOLVED  Present on admission, HR > 90, WBC > 12, SBP drop > 40 2/2 sepsis    -Infectious source - C- diff  - c/w PO vanc

## 2025-05-11 NOTE — PROGRESS NOTE ADULT - ATTENDING COMMENTS
Patient was seen and examined at bedside on 5/11/2025 at present. Patient reports she feels improved. Continued diarrhea although frequency is improving. Patient denies SOB, CP. ROS is otherwise negative. Vitals, labwork and pertinent imaging reviewed. Physical exam - NAD, AAO x 4, MMM, EOMI, chest - CTA b/l, CV - rrr, s1s2, no m/r/g, abd - soft, NTND, + BS, ext - wwp, back - midline, psych - normal affect, skin - no rash    Plan  -C/w PO Vancomycin  -Stool count  -Replete K  -Anticipate patient will be medically ready for d/c in AM

## 2025-05-11 NOTE — PROGRESS NOTE ADULT - SUBJECTIVE AND OBJECTIVE BOX
HOSPITAL COURSE: 74yo  w/ PMH diverticulitis, ischemic colitis presenting with 3d hx of worsening diarrhea admitted for severe C diff colitis.    INTERVAL HPI/OVERNIGHT EVENTS: CELIA    SUBJECTIVE: Patient seen and examined at bedside, resting comfortably in bed, in no acute distress. Patient reports    Vital Signs Last 24 Hrs  T(C): 36.4 (11 May 2025 05:42), Max: 36.7 (10 May 2025 22:05)  T(F): 97.5 (11 May 2025 05:42), Max: 98.1 (10 May 2025 22:05)  HR: 71 (11 May 2025 05:42) (71 - 92)  BP: 135/72 (11 May 2025 05:42) (135/72 - 159/77)  BP(mean): --  RR: 18 (11 May 2025 05:42) (17 - 18)  SpO2: 98% (11 May 2025 05:42) (96% - 98%)    Parameters below as of 11 May 2025 05:42  Patient On (Oxygen Delivery Method): room air      PHYSICAL EXAM:  General: in no acute distress  Eyes: PERRL  HENT: Moist mucous membranes  Lungs: CTA B/L. No wheezes, rales, or rhonchi  Cardiovascular: RRR. No murmurs, rubs, or gallops  Abdomen: Soft, non-tender non-distended; No rebound or guarding  Extremities: WWP, no edema  Neurological: Alert and oriented  Skin: Warm and dry, no obvious rash    LABS:                        12.4   14.55 )-----------( 193      ( 10 May 2025 07:51 )             38.1     05-10    136  |  100  |  6[L]  ----------------------------<  103[H]  3.7   |  25  |  0.64    Ca    9.5      10 May 2025 19:14  Phos  2.6     05-10  Mg     1.8     05-10     HOSPITAL COURSE: 76yo  w/ PMH diverticulitis, ischemic colitis presenting with 3d hx of worsening diarrhea admitted for severe C diff colitis.    INTERVAL HPI/OVERNIGHT EVENTS: CELIA    SUBJECTIVE: Patient seen and examined at bedside, resting comfortably in bed, in no acute distress. Patient reports that she had about 15 episodes of diarrhea over the past day. Feels dehydrated but has been drinking water and tolerating PO. Slightly bloated/gassy but no discrete abdominal pain. Does not feel ready to go home today.    Vital Signs Last 24 Hrs  T(C): 36.4 (11 May 2025 05:42), Max: 36.7 (10 May 2025 22:05)  T(F): 97.5 (11 May 2025 05:42), Max: 98.1 (10 May 2025 22:05)  HR: 71 (11 May 2025 05:42) (71 - 92)  BP: 135/72 (11 May 2025 05:42) (135/72 - 159/77)  BP(mean): --  RR: 18 (11 May 2025 05:42) (17 - 18)  SpO2: 98% (11 May 2025 05:42) (96% - 98%)    Parameters below as of 11 May 2025 05:42  Patient On (Oxygen Delivery Method): room air      PHYSICAL EXAM:  General: in no acute distress  HEENT: PERRL, dry mucous membranes  Lungs: CTA B/L. No wheezes, rales, or rhonchi  Cardiovascular: RRR. No murmurs, rubs, or gallops  Abdomen: Soft, non-tender, mildly distended, No rebound or guarding  Extremities: WWP, no edema  Neurological: Alert and oriented  Skin: Warm and dry, no obvious rash    LABS:                        12.4   14.55 )-----------( 193      ( 10 May 2025 07:51 )             38.1     05-10    136  |  100  |  6[L]  ----------------------------<  103[H]  3.7   |  25  |  0.64    Ca    9.5      10 May 2025 19:14  Phos  2.6     05-10  Mg     1.8     05-10

## 2025-05-11 NOTE — PROGRESS NOTE ADULT - PROBLEM SELECTOR PLAN 2
Pt presenting with severe nonbloody diarrea, found to be (+) for c. diff on stool testing. Meets criteria for severe c. diff (WBC >15k). CTAP showing no acute abnormality in the abdomen and pelvis including megacolon, pt hemodynamically stable, no evidence of fulminant c. diff. Was admitted mid-april and was on ctx/flagyl during episode of ischemic colitis.  - hold home protonix  - C/w vancomycin  mg q6 x 10 days  - encourage PO intake  - tylenol and zofran PRN for pain/nausea  - pt will reach out to GI to move up her colonoscopy which was initially scheduled for June   - Stool count  - baseline EKG for qtc Pt presenting with severe nonbloody diarrea, found to be (+) for c. diff on stool testing. Meets criteria for severe c. diff (WBC >15k). CTAP showing no acute abnormality in the abdomen and pelvis including megacolon, pt hemodynamically stable, no evidence of fulminant c. diff. Was admitted mid-april and was on ctx/flagyl during episode of ischemic colitis.    - hold home protonix  - C/w vancomycin  mg q6 x 10 days  - encourage PO intake  - tylenol and zofran PRN for pain/nausea  - pt will reach out to GI to move up her colonoscopy which was initially scheduled for June   - Stool count

## 2025-05-12 ENCOUNTER — TRANSCRIPTION ENCOUNTER (OUTPATIENT)
Age: 75
End: 2025-05-12

## 2025-05-12 VITALS — WEIGHT: 108.03 LBS

## 2025-05-12 LAB
ANION GAP SERPL CALC-SCNC: 9 MMOL/L — SIGNIFICANT CHANGE UP (ref 5–17)
BASOPHILS # BLD AUTO: 0.03 K/UL — SIGNIFICANT CHANGE UP (ref 0–0.2)
BASOPHILS NFR BLD AUTO: 0.5 % — SIGNIFICANT CHANGE UP (ref 0–2)
BUN SERPL-MCNC: 8 MG/DL — SIGNIFICANT CHANGE UP (ref 7–23)
CALCIUM SERPL-MCNC: 8.8 MG/DL — SIGNIFICANT CHANGE UP (ref 8.4–10.5)
CHLORIDE SERPL-SCNC: 108 MMOL/L — SIGNIFICANT CHANGE UP (ref 96–108)
CO2 SERPL-SCNC: 26 MMOL/L — SIGNIFICANT CHANGE UP (ref 22–31)
CREAT SERPL-MCNC: 0.82 MG/DL — SIGNIFICANT CHANGE UP (ref 0.5–1.3)
EGFR: 75 ML/MIN/1.73M2 — SIGNIFICANT CHANGE UP
EGFR: 75 ML/MIN/1.73M2 — SIGNIFICANT CHANGE UP
EOSINOPHIL # BLD AUTO: 0.21 K/UL — SIGNIFICANT CHANGE UP (ref 0–0.5)
EOSINOPHIL NFR BLD AUTO: 3.5 % — SIGNIFICANT CHANGE UP (ref 0–6)
GLUCOSE SERPL-MCNC: 103 MG/DL — HIGH (ref 70–99)
HCT VFR BLD CALC: 35.2 % — SIGNIFICANT CHANGE UP (ref 34.5–45)
HGB BLD-MCNC: 11.3 G/DL — LOW (ref 11.5–15.5)
IMM GRANULOCYTES NFR BLD AUTO: 0.5 % — SIGNIFICANT CHANGE UP (ref 0–0.9)
LYMPHOCYTES # BLD AUTO: 2.17 K/UL — SIGNIFICANT CHANGE UP (ref 1–3.3)
LYMPHOCYTES # BLD AUTO: 35.9 % — SIGNIFICANT CHANGE UP (ref 13–44)
MAGNESIUM SERPL-MCNC: 2.1 MG/DL — SIGNIFICANT CHANGE UP (ref 1.6–2.6)
MCHC RBC-ENTMCNC: 29.7 PG — SIGNIFICANT CHANGE UP (ref 27–34)
MCHC RBC-ENTMCNC: 32.1 G/DL — SIGNIFICANT CHANGE UP (ref 32–36)
MCV RBC AUTO: 92.4 FL — SIGNIFICANT CHANGE UP (ref 80–100)
MONOCYTES # BLD AUTO: 0.89 K/UL — SIGNIFICANT CHANGE UP (ref 0–0.9)
MONOCYTES NFR BLD AUTO: 14.7 % — HIGH (ref 2–14)
NEUTROPHILS # BLD AUTO: 2.72 K/UL — SIGNIFICANT CHANGE UP (ref 1.8–7.4)
NEUTROPHILS NFR BLD AUTO: 44.9 % — SIGNIFICANT CHANGE UP (ref 43–77)
NRBC BLD AUTO-RTO: 0 /100 WBCS — SIGNIFICANT CHANGE UP (ref 0–0)
PHOSPHATE SERPL-MCNC: 2.6 MG/DL — SIGNIFICANT CHANGE UP (ref 2.5–4.5)
PLATELET # BLD AUTO: 191 K/UL — SIGNIFICANT CHANGE UP (ref 150–400)
POTASSIUM SERPL-MCNC: 4.1 MMOL/L — SIGNIFICANT CHANGE UP (ref 3.5–5.3)
POTASSIUM SERPL-SCNC: 4.1 MMOL/L — SIGNIFICANT CHANGE UP (ref 3.5–5.3)
RBC # BLD: 3.81 M/UL — SIGNIFICANT CHANGE UP (ref 3.8–5.2)
RBC # FLD: 14.1 % — SIGNIFICANT CHANGE UP (ref 10.3–14.5)
SODIUM SERPL-SCNC: 143 MMOL/L — SIGNIFICANT CHANGE UP (ref 135–145)
WBC # BLD: 6.05 K/UL — SIGNIFICANT CHANGE UP (ref 3.8–10.5)
WBC # FLD AUTO: 6.05 K/UL — SIGNIFICANT CHANGE UP (ref 3.8–10.5)

## 2025-05-12 PROCEDURE — 99239 HOSP IP/OBS DSCHRG MGMT >30: CPT

## 2025-05-12 RX ADMIN — Medication 125 MILLIGRAM(S): at 12:25

## 2025-05-12 RX ADMIN — Medication 125 MILLIGRAM(S): at 06:03

## 2025-05-12 RX ADMIN — Medication 125 MILLIGRAM(S): at 00:32

## 2025-05-12 NOTE — PROGRESS NOTE ADULT - PROBLEM SELECTOR PLAN 2
Pt presenting with severe nonbloody diarrea, found to be (+) for c. diff on stool testing. Meets criteria for severe c. diff (WBC >15k). CTAP showing no acute abnormality in the abdomen and pelvis including megacolon, pt hemodynamically stable, no evidence of fulminant c. diff. Was admitted mid-april and was on ctx/flagyl during episode of ischemic colitis.    - hold home protonix  - C/w vancomycin  mg q6 x 10 days  - encourage PO intake  - tylenol and zofran PRN for pain/nausea  - pt will reach out to GI to move up her colonoscopy which was initially scheduled for June   - Stool count

## 2025-05-12 NOTE — PROGRESS NOTE ADULT - PROBLEM SELECTOR PLAN 1
RESOLVED  Present on admission, HR > 90, WBC > 12, SBP drop > 40 2/2 sepsis    -Infectious source - C- diff  - c/w PO vanc

## 2025-05-12 NOTE — DIETITIAN INITIAL EVALUATION ADULT - PERTINENT LABORATORY DATA
05-12    143  |  108  |  8   ----------------------------<  103[H]  4.1   |  26  |  0.82    Ca    8.8      12 May 2025 05:30  Phos  2.6     05-12  Mg     2.1     05-12    TPro  5.6[L]  /  Alb  3.5  /  TBili  0.4  /  DBili  x   /  AST  18  /  ALT  14  /  AlkPhos  65  05-11

## 2025-05-12 NOTE — DISCHARGE NOTE PROVIDER - CARE PROVIDER_API CALL
RANDY JAMES  [address] [facility] [place] [address]  Phone: [phone]  Fax: [phone]  Established Patient  Follow Up Time: 2 weeks

## 2025-05-12 NOTE — DIETITIAN INITIAL EVALUATION ADULT - OTHER INFO
"74yo  w/ PMH diverticulitis, ischemic colitis presenting with 3d hx of worsening diarrhea admitted for severe C diff colitis."    Visited pt at bedside on 4UR for initial assessment. States that at baseline pt consumes 2 meals at baseline with no recent changes. Does not follow any therapeutic diet at home. No cultural, ethnic, Caodaism food preferences noted. Confirms allergy to tumeric and salmon. States that pt had become anaphylactic in the past when consuming food with tumeric, and salmon causes vomiting. States that she takes vitamin D3, B complex, biotin, Calcium. Reports no additional use of oral nutritional supplement. No difficulties chewing or swallowing reported per pt at time of visit. No issues with nausea/vomiting/constipation. States that she was previously having diarrhea. No diarrhea today at time of visit. Current diet: Regular. States that pt has been keeping up her appetite and consuming % while inpatient. Dosing weight 108 pounds, UBW: ~108 pounds, IBW: 115 pounds, %IBW: 93%. Observed with no overt signs and symptoms of muscle or fat wasting. Based on ASPEN guidelines, pt does not meet criteria for malnutrition. 0/10 pain per flow sheets. No Pressure Injuries per flow sheets. Murali Score: 20; No Edema per flow sheets. Meds reviewed. simethicone, zofran PRN. Nutritionally pertinent Labs 5/12 Gluc: 103 (High). See Nutrition recommendations below.

## 2025-05-12 NOTE — PROGRESS NOTE ADULT - SUBJECTIVE AND OBJECTIVE BOX
**INCOMPLETE NOTE    INTERVAL/OVERNIGHT EVENTS: None    SUBJECTIVE:  Patient seen and examined at bedside, comfortable, NAD. Denied fever, chest pain, dyspnea, abdominal pain.     Vital Signs Last 12 Hrs  T(F): 97.4 (05-12-25 @ 06:10), Max: 98.3 (05-11-25 @ 22:22)  HR: 67 (05-12-25 @ 06:10) (67 - 86)  BP: 126/76 (05-12-25 @ 06:10) (126/76 - 168/85)  BP(mean): --  RR: 18 (05-12-25 @ 06:10) (18 - 18)  SpO2: 96% (05-12-25 @ 06:10) (96% - 96%)  I&O's Summary      PHYSICAL EXAM:  General: NAD  HEENT: PERRL, EOM intact, sclera anicteric, MMM  Cardiovascular: RRR; no MRG; no JVD  Respiratory: CTAB; no WRR  GI/: soft; NTND; BS x4  Extremities: WWP; 2+ peripheral pulses bilaterally; no LE edema  Skin: normal color & turgor; no rash  Neurologic: aox3; no focal deficits    LABS:                        11.1   9.37  )-----------( 179      ( 11 May 2025 05:30 )             33.6     05-11    138  |  104  |  6[L]  ----------------------------<  110[H]  3.2[L]   |  25  |  0.66    Ca    8.8      11 May 2025 05:30  Phos  2.3     05-11  Mg     2.0     05-11    TPro  5.6[L]  /  Alb  3.5  /  TBili  0.4  /  DBili  x   /  AST  18  /  ALT  14  /  AlkPhos  65  05-11      Urinalysis Basic - ( 11 May 2025 05:30 )    Color: x / Appearance: x / SG: x / pH: x  Gluc: 110 mg/dL / Ketone: x  / Bili: x / Urobili: x   Blood: x / Protein: x / Nitrite: x   Leuk Esterase: x / RBC: x / WBC x   Sq Epi: x / Non Sq Epi: x / Bacteria: x          RADIOLOGY & ADDITIONAL TESTS:    MEDICATIONS  (STANDING):  sodium chloride 0.9%. 1000 milliLiter(s) (80 mL/Hr) IV Continuous <Continuous>  vancomycin    Solution 125 milliGRAM(s) Oral every 6 hours    MEDICATIONS  (PRN):  acetaminophen     Tablet .. 650 milliGRAM(s) Oral every 6 hours PRN Temp greater or equal to 38C (100.4F), Mild Pain (1 - 3)  ALPRAZolam 0.125 milliGRAM(s) Oral at bedtime PRN insomnia  aluminum hydroxide/magnesium hydroxide/simethicone Suspension 30 milliLiter(s) Oral every 4 hours PRN Dyspepsia  melatonin 3 milliGRAM(s) Oral at bedtime PRN Insomnia  ondansetron Injectable 4 milliGRAM(s) IV Push every 8 hours PRN Nausea and/or Vomiting  simethicone 80 milliGRAM(s) Chew four times a day PRN Gas

## 2025-05-12 NOTE — PROGRESS NOTE ADULT - ASSESSMENT
74yo  w/ PMH diverticulitis, ischemic colitis (2016, recent admit to [facility] 4/6/25-4/10/25 under Dr. Isaac) and H adenoidectomy, hysteroscopy pw 3d hx of worsening diarrhea and a 1d hx of nausea. Pt states that 3d prior to presentation she began having multiple loose, mucus-mixed nonbloody stools per day. On day of presentation she had reportedly >25 mucus-mixed nonbloody loose stools and nausea. Patient with subjective fever/chills, nausea w/out emesis. Labs notable for WBC 19.2, but otherwise labs including GI PCR WNL. CT AP showing no acute abnormality in the abdomen and pelvis compared to CTAP done on prior admission on 4/6/25 that showed thickening and inflammatory changes in the descending colon and sigmoid colon suspicious for colitis. Patient with non bloody diarrhea. Low concern for recurrent ischemic colitis. Continue to work up infectious etiologies.     No acute surgical intervention or admission to surgery warranted at this time  rest of care per primary team    Team 4c will continue to follow
74yo  w/ PMH diverticulitis, ischemic colitis (2016, recent admit to [facility] 4/6/25-4/10/25 under Dr. Isaac) and PSH adenoidectomy, hysteroscopy pw 3d hx of worsening diarrhea and a 1d hx of nausea. Pt states that 3d prior to presentation she began having multiple loose, mucus-mixed nonbloody stools per day. C. Diff +. CT AP showing no acute abnormality in the abdomen and pelvis compared to CTAP done on prior admission on 4/6/25 that showed thickening and inflammatory changes in the descending colon and sigmoid colon suspicious for colitis. Patient with non bloody diarrhea. General surgery consulted for possible colitis, but low concern for recurrent ischemic colitis.     Recommendations  No need for acute surgical intervention  Patient with improving symptoms  Rest of care as per primary team    General surgery team 4c will sign off on this patient. 
74yo  w/ PMH diverticulitis, ischemic colitis (2016, recent admit to [facility] 4/6/25-4/10/25 under Dr. Isaac) and PSH adenoidectomy, hysteroscopy pw 3d hx of worsening diarrhea and a 1d hx of nausea. Pt states that 3d prior to presentation she began having multiple loose, mucus-mixed nonbloody stools per day. On day of presentation she had reportedly >25 mucus-mixed nonbloody loose stools and nausea. Patient with subjective fever/chills, nausea w/out emesis. Labs notable for WBC 19.2, but otherwise labs including GI PCR WNL. CT AP showing no acute abnormality in the abdomen and pelvis compared to CTAP done on prior admission on 4/6/25 that showed thickening and inflammatory changes in the descending colon and sigmoid colon suspicious for colitis. Patient with non bloody diarrhea. Low concern for recurrent ischemic colitis. Continue to work up infectious etiologies.     Recommendations  - No acute surgical intervention or admission to surgery warranted at this time  - Rest of care per primary team  - Team 4c will continue to follow      Plan discussed with attending and chief resident.   ____________________________________________________  Lori Mason - Resident   Surgery   
76yo  w/ PMH diverticulitis, ischemic colitis presenting with 3d hx of worsening diarrhea admitted for severe C diff colitis.
76yo  w/ PMH diverticulitis, ischemic colitis presenting with 3d hx of worsening diarrhea admitted for severe C diff colitis.
74yo  w/ PMH diverticulitis, ischemic colitis presenting with 3d hx of worsening diarrhea admitted for severe C diff colitis.

## 2025-05-12 NOTE — PROGRESS NOTE ADULT - PROBLEM SELECTOR PLAN 5
F: s/p 2L NS in the ED   E: replete K<4, Mg<2  N: full liquid diet, advance as tolerated  VTE Prophylaxis: none, pt ambulatory   GI: hold home protonix  C: Full Code  D: F

## 2025-05-12 NOTE — DIETITIAN INITIAL EVALUATION ADULT - REASON INDICATOR FOR ASSESSMENT
Nutrition consult warranted for: Assessment, Education   Information obtained from: electronic medical record and patient  Chart reviewed, events noted.

## 2025-05-12 NOTE — DIETITIAN INITIAL EVALUATION ADULT - OTHER CALCULATIONS
Actual body weight (49kg) used to calculate energy needs due to pt's current body weight within % (93%) ideal body weight. Needs adjusted for clinical course, advanced age.

## 2025-05-12 NOTE — DISCHARGE NOTE NURSING/CASE MANAGEMENT/SOCIAL WORK - PATIENT PORTAL LINK FT
You can access the FollowMyHealth Patient Portal offered by [facility] by registering at the following website: [email or web address]. By joining [facility]’s FollowMyHealth portal, you will also be able to view your health information using other applications (apps) compatible with our system.

## 2025-05-12 NOTE — DISCHARGE NOTE NURSING/CASE MANAGEMENT/SOCIAL WORK - NSDCPEFALRISK_GEN_ALL_CORE
For information on Fall & Injury Prevention, visit: [email or web address] OR  [email or web address] OR  https://www.cdc.gov/steadi/patient.html

## 2025-05-12 NOTE — DISCHARGE NOTE PROVIDER - HOSPITAL COURSE
[ ***ASSESSMENT*** .. pt w hx james, pres w james, found w ....]   76yo  w/ PMH diverticulitis, ischemic colitis presenting with 3d hx of worsening diarrhea admitted for severe C diff colitis  Hospital course (by problem):  #Pt presenting with severe nonbloody diarrea, found to be (+) for c. diff on stool testing. Meets criteria for severe c. diff (WBC >15k). CTAP showing no acute abnormality in the abdomen and pelvis including megacolon, pt hemodynamically stable, no evidence of fulminant c. diff. Was admitted mid-april and was on ctx/flagyl during episode of ischemic colitis.    - hold home protonix  - C/w vancomycin  mg q6 x 10 days  - encourage PO intake  - tylenol and zofran PRN for pain/nausea  - pt will reach out to GI to move up her colonoscopy which was initially scheduled for June   - Stool count    New medications:   Changes to old medications:  Items to follow up outpatient:  Physical Exam at time of Discharge:  ****   [ ***ASSESSMENT*** .. pt w hx james, pres w james, found w ....]   74yo  w/ PMH diverticulitis, ischemic colitis presenting with 3d hx of worsening diarrhea admitted for severe C diff colitis  Hospital course (by problem):  #Severe Sepsis  #Clostridium difficile colitis   Pt presenting with severe nonbloody diarrhea, found to be (+) for c. diff on stool testing; initially metting 3/4 SIRS criteria HR > 90, WBC > 12, SBP drop > 40. Meets criteria for severe c. diff (WBC >15k). CTAP showing no acute abnormality in the abdomen and pelvis including megacolon, pt hemodynamically stable, no evidence of fulminant c. diff. Was admitted mid-april and was on ctx/flagyl during episode of ischemic colitis.    - hold home protonix  - C/w vancomycin  mg q6 x 10 days (5/9-5/18)  - tylenol and zofran PRN for pain/nausea  - pt will reach out to GI to move up her colonoscopy which was initially scheduled for June     #Post menopausal syndrome.   Pt post menopausal for which she takes estrace 0.5mg qd, progesterone 20mg every 3 days. Patient will have her  bring in home medications.  - restart home meds with procurement    New medications: vancomycin 125mg po  Changes to old medications: Holding ppx  Items to follow up outpatient: GI, PCP    Physical Exam at time of Discharge:  General: in no acute distress  HEENT: PERRL, dry mucous membranes  Lungs: CTA B/L. No wheezes, rales, or rhonchi  Cardiovascular: RRR. No murmurs, rubs, or gallops  Abdomen: Soft, non-tender, mildly distended, No rebound or guarding  Extremities: WWP, no edema  Neurological: Alert and oriented  Skin: Warm and dry, no obvious rash

## 2025-05-12 NOTE — DISCHARGE NOTE NURSING/CASE MANAGEMENT/SOCIAL WORK - FINANCIAL ASSISTANCE
[facility] provides services at a reduced cost to those who are determined to be eligible through [facility]’s financial assistance program. Information regarding [facility]’s financial assistance program can be found by going to [email or web address] or by calling [phone].

## 2025-05-12 NOTE — DIETITIAN INITIAL EVALUATION ADULT - PERTINENT MEDS FT
MEDICATIONS  (STANDING):  vancomycin    Solution 125 milliGRAM(s) Oral every 6 hours    MEDICATIONS  (PRN):  acetaminophen     Tablet .. 650 milliGRAM(s) Oral every 6 hours PRN Temp greater or equal to 38C (100.4F), Mild Pain (1 - 3)  ALPRAZolam 0.125 milliGRAM(s) Oral at bedtime PRN insomnia  aluminum hydroxide/magnesium hydroxide/simethicone Suspension 30 milliLiter(s) Oral every 4 hours PRN Dyspepsia  melatonin 3 milliGRAM(s) Oral at bedtime PRN Insomnia  ondansetron Injectable 4 milliGRAM(s) IV Push every 8 hours PRN Nausea and/or Vomiting  simethicone 80 milliGRAM(s) Chew four times a day PRN Gas

## 2025-05-12 NOTE — DISCHARGE NOTE NURSING/CASE MANAGEMENT/SOCIAL WORK - NSTRANSFERBELONGINGSRESP_GEN_A_NUR
yes Subjective: Patient seen and examined. No new events except as noted.   s/p HD yesterday   resting comfortably       REVIEW OF SYSTEMS:    CONSTITUTIONAL: +weakness, fevers or chills  EYES/ENT: No visual changes;  No vertigo or throat pain   NECK: No pain or stiffness  RESPIRATORY: No cough, wheezing, hemoptysis; No shortness of breath  CARDIOVASCULAR: No chest pain or palpitations  GASTROINTESTINAL: No abdominal or epigastric pain. No nausea, vomiting, or hematemesis; No diarrhea or constipation. No melena or hematochezia.  GENITOURINARY: No dysuria, frequency or hematuria  NEUROLOGICAL: No numbness or weakness  SKIN: No itching, burning, rashes, or lesions   All other review of systems is negative unless indicated above.    MEDICATIONS:  MEDICATIONS  (STANDING):      PHYSICAL EXAM:  T(C): 36.7 (06-03-20 @ 06:36), Max: 36.7 (06-02-20 @ 21:30)  HR: 74 (06-03-20 @ 06:36) (70 - 87)  BP: 160/82 (06-03-20 @ 06:36) (117/69 - 206/79)  RR: 18 (06-03-20 @ 06:36) (14 - 18)  SpO2: 100% (06-03-20 @ 06:36) (95% - 100%)  Wt(kg): --  I&O's Summary    02 Jun 2020 07:01  -  03 Jun 2020 07:00  --------------------------------------------------------  IN: 240 mL / OUT: 1800 mL / NET: -1560 mL      Height (cm): 180.34 (06-02 @ 21:40)  Weight (kg): 76.1 (06-02 @ 21:40)  BMI (kg/m2): 23.4 (06-02 @ 21:40)  BSA (m2): 1.96 (06-02 @ 21:40)    Appearance: NAD	  HEENT:   Normal oral mucosa, PERRL, EOMI	  Lymphatic: No lymphadenopathy  Cardiovascular: Normal S1 S2, No JVD, No murmurs, No edema  Respiratory: Lungs clear to auscultation	  Psychiatry: A & O x 3, Mood & affect appropriate  Gastrointestinal:  Soft, Non-tender, + BS	  Skin: No rashes, No ecchymoses, No cyanosis	  Neurologic: Non-focal  Extremities: Normal range of motion, No clubbing, +LUE AV graft   Vascular: Decreased LLE distal pulses        LABS:    CARDIAC MARKERS:                                12.4   8.94  )-----------( 187      ( 03 Jun 2020 05:16 )             39.0     06-03    133<L>  |  93<L>  |  89<H>  ----------------------------<  277<H>  4.7   |  18<L>  |  11.38<H>    Ca    7.9<L>      03 Jun 2020 05:16  Phos  8.9     06-03  Mg     2.9     06-03      proBNP:   Lipid Profile:   HgA1c:   TSH:             TELEMETRY: 	    ECG:  	  RADIOLOGY:   DIAGNOSTIC TESTING:  [ ] Echocardiogram:  [ ]  Catheterization:  [ ] Stress Test:    OTHER:

## 2025-05-12 NOTE — DISCHARGE NOTE NURSING/CASE MANAGEMENT/SOCIAL WORK - NSDCPETBCESMAN_GEN_ALL_CORE
If you are a smoker, it is important for your health to stop smoking. Please be aware that second hand smoke is also harmful.
(V5) oriented

## 2025-05-12 NOTE — DIETITIAN INITIAL EVALUATION ADULT - PROBLEM SELECTOR PLAN 1
Pt presenting with severe nonbloody diarrea, found to be (+) for c. diff on stool testing. Meets criteria for severe c. diff (WBC >15k). CTAP showing no acute abnormality in the abdomen and pelvis including megacolon, pt hemodynamically stable, no evidence of fulminant c. diff. Was admitted mid-april and was on ctx/flagyl during episode of ischemic colitis.  - hold home protonix  - vancomycin  mg q6 x 10 days  - encourage PO intake  - tylenol and zofran PRN for pain/nausea  - pt will reach out to GI to move up her colonoscopy which was initially scheduled for June   - baseline EKG for qtc

## 2025-05-12 NOTE — DISCHARGE NOTE PROVIDER - NSDCMRMEDTOKEN_GEN_ALL_CORE_FT
aspirin 81 mg oral tablet: orally every 3 days  Estrace 0.5 mg oral tablet: 1 tab(s) orally once a day  pantoprazole 20 mg oral delayed release tablet: 1 tab(s) orally once a day  progesterone 100 mg oral capsule: 1 cap(s) orally every 3 days  vancomycin 125 mg oral capsule: 1 cap(s) orally every 6 hours   Estrace 0.5 mg oral tablet: 1 tab(s) orally once a day  progesterone 100 mg oral capsule: 1 cap(s) orally every 3 days  vancomycin 125 mg oral capsule: 1 cap(s) orally every 6 hours

## 2025-05-12 NOTE — DIETITIAN INITIAL EVALUATION ADULT - ADD RECOMMEND
1. Continue with current diet as tolerated: Regular   2. Monitor PO intake, diet tolerance, weight trends, labs, and skin integrity and bowel movement regularity.   3. Encourage PO intake and honor food preferences as able unless otherwise contraindicated.   4. Provide ongoing education as needed.   5. RDN will continue to monitor, reassess, and intervene as appropriate.

## 2025-05-12 NOTE — DISCHARGE NOTE PROVIDER - NSDCCPCAREPLAN_GEN_ALL_CORE_FT
PRINCIPAL DISCHARGE DIAGNOSIS  Diagnosis: Severe diarrhea  Assessment and Plan of Treatment: You came to the hospital due to worsenind diarrhea and were found to have an infection called Clostridium difficle which is a type of infectious diarrhea some people develop after taking antibiotics. You were started on an oral antibiotic called vancomycin and we ask that you continue it for a full ten day course. In the time that you have been on this medication, your symptoms have improved. We ask that you continue to hold off taking your pantoprazole as this medication can worsen your C.diff     PRINCIPAL DISCHARGE DIAGNOSIS  Diagnosis: Severe diarrhea  Assessment and Plan of Treatment: You came to the hospital due to worsening diarrhea and were found to have an infection called Clostridium difficle which is a type of infectious diarrhea some people develop after taking antibiotics. You were started on an oral antibiotic called vancomycin and we ask that you continue it for a full ten day course. In the time that you have been on this medication, your symptoms have improved. We ask that you continue to hold off taking your pantoprazole as this medication can worsen your C.diff and follow up with your primary care provider for continued monitoring. We have made no further changes to your home medications, please continue to take as prescribed.  PLEASE CONTINUE TO TAKE VANCOMYCIN 125MG EVERY 6 HOURS UNTIL 5/18

## 2025-05-12 NOTE — PROGRESS NOTE ADULT - SUBJECTIVE AND OBJECTIVE BOX
INTERVAL HPI/OVERNIGHT EVENTS: No acute events overnight.     SUBJECTIVE:  Patient seen and examined on AM rounds. Afebrile. VSS. Patient refers improving abdominal pain. No complaints of nausea or emesis. Refers decreased number of bowel movements.     MEDICATIONS  (STANDING):  vancomycin    Solution 125 milliGRAM(s) Oral every 6 hours    MEDICATIONS  (PRN):  acetaminophen     Tablet .. 650 milliGRAM(s) Oral every 6 hours PRN Temp greater or equal to 38C (100.4F), Mild Pain (1 - 3)  ALPRAZolam 0.125 milliGRAM(s) Oral at bedtime PRN insomnia  aluminum hydroxide/magnesium hydroxide/simethicone Suspension 30 milliLiter(s) Oral every 4 hours PRN Dyspepsia  melatonin 3 milliGRAM(s) Oral at bedtime PRN Insomnia  ondansetron Injectable 4 milliGRAM(s) IV Push every 8 hours PRN Nausea and/or Vomiting  simethicone 80 milliGRAM(s) Chew four times a day PRN Gas      Vital Signs Last 24 Hrs  T(C): 36.2 (12 May 2025 09:17), Max: 36.8 (11 May 2025 22:22)  T(F): 97.2 (12 May 2025 09:17), Max: 98.3 (11 May 2025 22:22)  HR: 73 (12 May 2025 09:17) (67 - 86)  BP: 139/63 (12 May 2025 09:17) (126/76 - 168/85)  BP(mean): --  RR: 18 (12 May 2025 09:17) (18 - 18)  SpO2: 96% (12 May 2025 09:17) (96% - 97%)    Parameters below as of 12 May 2025 09:17  Patient On (Oxygen Delivery Method): room air        PHYSICAL EXAM:    Constitutional: A&Ox3, nad  Respiratory: non labored breathing, no respiratory distress  Cardiovascular: NSR, RRR  Gastrointestinal: abd soft, mild TTP on lower abdomen, ND  Genitourinary: voiding  Extremities: (-) edema            I&O's Detail      LABS:                        11.3   6.05  )-----------( 191      ( 12 May 2025 05:30 )             35.2     05-12    143  |  108  |  8   ----------------------------<  103[H]  4.1   |  26  |  0.82    Ca    8.8      12 May 2025 05:30  Phos  2.6     05-12  Mg     2.1     05-12    TPro  5.6[L]  /  Alb  3.5  /  TBili  0.4  /  DBili  x   /  AST  18  /  ALT  14  /  AlkPhos  65  05-11      Urinalysis Basic - ( 12 May 2025 05:30 )    Color: x / Appearance: x / SG: x / pH: x  Gluc: 103 mg/dL / Ketone: x  / Bili: x / Urobili: x   Blood: x / Protein: x / Nitrite: x   Leuk Esterase: x / RBC: x / WBC x   Sq Epi: x / Non Sq Epi: x / Bacteria: x        RADIOLOGY & ADDITIONAL STUDIES:

## 2025-05-12 NOTE — PROGRESS NOTE ADULT - PROBLEM SELECTOR PROBLEM 2
Colitis due to Clostridioides difficile

## 2025-05-13 VITALS
SYSTOLIC BLOOD PRESSURE: 143 MMHG | HEIGHT: 63 IN | DIASTOLIC BLOOD PRESSURE: 83 MMHG | TEMPERATURE: 98 F | RESPIRATION RATE: 18 BRPM | WEIGHT: 108.03 LBS | OXYGEN SATURATION: 97 % | HEART RATE: 101 BPM

## 2025-05-13 LAB
ANION GAP SERPL CALC-SCNC: 16 MMOL/L — SIGNIFICANT CHANGE UP (ref 5–17)
BASOPHILS # BLD AUTO: 0 K/UL — SIGNIFICANT CHANGE UP (ref 0–0.2)
BASOPHILS NFR BLD AUTO: 0 % — SIGNIFICANT CHANGE UP (ref 0–2)
BUN SERPL-MCNC: 10 MG/DL — SIGNIFICANT CHANGE UP (ref 7–23)
CALCIUM SERPL-MCNC: 10.1 MG/DL — SIGNIFICANT CHANGE UP (ref 8.4–10.5)
CHLORIDE SERPL-SCNC: 98 MMOL/L — SIGNIFICANT CHANGE UP (ref 96–108)
CO2 SERPL-SCNC: 25 MMOL/L — SIGNIFICANT CHANGE UP (ref 22–31)
CREAT SERPL-MCNC: 0.78 MG/DL — SIGNIFICANT CHANGE UP (ref 0.5–1.3)
EGFR: 80 ML/MIN/1.73M2 — SIGNIFICANT CHANGE UP
EGFR: 80 ML/MIN/1.73M2 — SIGNIFICANT CHANGE UP
EOSINOPHIL # BLD AUTO: 0 K/UL — SIGNIFICANT CHANGE UP (ref 0–0.5)
EOSINOPHIL NFR BLD AUTO: 0 % — SIGNIFICANT CHANGE UP (ref 0–6)
GLUCOSE SERPL-MCNC: 128 MG/DL — HIGH (ref 70–99)
HCT VFR BLD CALC: 41.5 % — SIGNIFICANT CHANGE UP (ref 34.5–45)
HGB BLD-MCNC: 13.8 G/DL — SIGNIFICANT CHANGE UP (ref 11.5–15.5)
LYMPHOCYTES # BLD AUTO: 1.38 K/UL — SIGNIFICANT CHANGE UP (ref 1–3.3)
LYMPHOCYTES # BLD AUTO: 7.9 % — LOW (ref 13–44)
MAGNESIUM SERPL-MCNC: 1.7 MG/DL — SIGNIFICANT CHANGE UP (ref 1.6–2.6)
MCHC RBC-ENTMCNC: 29.5 PG — SIGNIFICANT CHANGE UP (ref 27–34)
MCHC RBC-ENTMCNC: 33.3 G/DL — SIGNIFICANT CHANGE UP (ref 32–36)
MCV RBC AUTO: 88.7 FL — SIGNIFICANT CHANGE UP (ref 80–100)
MONOCYTES # BLD AUTO: 1.54 K/UL — HIGH (ref 0–0.9)
MONOCYTES NFR BLD AUTO: 8.8 % — SIGNIFICANT CHANGE UP (ref 2–14)
NEUTROPHILS # BLD AUTO: 14.54 K/UL — HIGH (ref 1.8–7.4)
NEUTROPHILS NFR BLD AUTO: 83.3 % — HIGH (ref 43–77)
PLATELET # BLD AUTO: 225 K/UL — SIGNIFICANT CHANGE UP (ref 150–400)
POTASSIUM SERPL-MCNC: 3.6 MMOL/L — SIGNIFICANT CHANGE UP (ref 3.5–5.3)
POTASSIUM SERPL-SCNC: 3.6 MMOL/L — SIGNIFICANT CHANGE UP (ref 3.5–5.3)
RBC # BLD: 4.68 M/UL — SIGNIFICANT CHANGE UP (ref 3.8–5.2)
RBC # FLD: 13.6 % — SIGNIFICANT CHANGE UP (ref 10.3–14.5)
SODIUM SERPL-SCNC: 139 MMOL/L — SIGNIFICANT CHANGE UP (ref 135–145)
WBC # BLD: 17.45 K/UL — HIGH (ref 3.8–10.5)
WBC # FLD AUTO: 17.45 K/UL — HIGH (ref 3.8–10.5)

## 2025-05-13 PROCEDURE — 99285 EMERGENCY DEPT VISIT HI MDM: CPT

## 2025-05-13 PROCEDURE — 71045 X-RAY EXAM CHEST 1 VIEW: CPT | Mod: 26

## 2025-05-13 RX ORDER — IOHEXOL 350 MG/ML
30 INJECTION, SOLUTION INTRAVENOUS ONCE
Refills: 0 | Status: COMPLETED | OUTPATIENT
Start: 2025-05-13 | End: 2025-05-13

## 2025-05-13 RX ORDER — SODIUM CHLORIDE 9 G/1000ML
1000 INJECTION, SOLUTION INTRAVENOUS ONCE
Refills: 0 | Status: COMPLETED | OUTPATIENT
Start: 2025-05-13 | End: 2025-05-13

## 2025-05-13 RX ORDER — MAGNESIUM SULFATE 500 MG/ML
2 SYRINGE (ML) INJECTION ONCE
Refills: 0 | Status: COMPLETED | OUTPATIENT
Start: 2025-05-13 | End: 2025-05-13

## 2025-05-13 RX ADMIN — Medication 25 GRAM(S): at 22:37

## 2025-05-13 RX ADMIN — IOHEXOL 30 MILLILITER(S): 350 INJECTION, SOLUTION INTRAVENOUS at 22:37

## 2025-05-13 RX ADMIN — SODIUM CHLORIDE 1000 MILLILITER(S): 9 INJECTION, SOLUTION INTRAVENOUS at 21:23

## 2025-05-13 NOTE — ED ADULT NURSE NOTE - OBJECTIVE STATEMENT
Pt is a 75 yr old female coming in with complaint of increased diarrhea. Pt has hx of recent ischemic colitis and was put on antibiotics. Pt developed diarrhea which was found to be positive for C-diff and discharged with oral vanco. Today would be pt's 4th dose. Pt notes that her sxs were actually improving Sunday and Monday had very little diarrhea. However, today from 2PM-7:30Pm pt noted to have 13 episodes of diarrhea. Pt notes she is having mostly loose and some formed stools. no blood. Pt denies any fevers today. No CP/SOB.

## 2025-05-14 ENCOUNTER — INPATIENT (INPATIENT)
Facility: HOSPITAL | Age: 75
LOS: 5 days | Discharge: HOME | End: 2025-05-20
Attending: STUDENT IN AN ORGANIZED HEALTH CARE EDUCATION/TRAINING PROGRAM | Admitting: STUDENT IN AN ORGANIZED HEALTH CARE EDUCATION/TRAINING PROGRAM
Payer: MEDICARE

## 2025-05-14 DIAGNOSIS — N95.1 MENOPAUSAL AND FEMALE CLIMACTERIC STATES: ICD-10-CM

## 2025-05-14 DIAGNOSIS — A04.72 ENTEROCOLITIS DUE TO CLOSTRIDIUM DIFFICILE, NOT SPECIFIED AS RECURRENT: ICD-10-CM

## 2025-05-14 DIAGNOSIS — A41.9 SEPSIS, UNSPECIFIED ORGANISM: ICD-10-CM

## 2025-05-14 DIAGNOSIS — Z29.9 ENCOUNTER FOR PROPHYLACTIC MEASURES, UNSPECIFIED: ICD-10-CM

## 2025-05-14 LAB
ALBUMIN SERPL ELPH-MCNC: 3.8 G/DL — SIGNIFICANT CHANGE UP (ref 3.3–5)
ALP SERPL-CCNC: 74 U/L — SIGNIFICANT CHANGE UP (ref 40–120)
ALT FLD-CCNC: 17 U/L — SIGNIFICANT CHANGE UP (ref 10–45)
ANION GAP SERPL CALC-SCNC: 13 MMOL/L — SIGNIFICANT CHANGE UP (ref 5–17)
AST SERPL-CCNC: 22 U/L — SIGNIFICANT CHANGE UP (ref 10–40)
BASOPHILS # BLD AUTO: 0 K/UL — SIGNIFICANT CHANGE UP (ref 0–0.2)
BASOPHILS NFR BLD AUTO: 0 % — SIGNIFICANT CHANGE UP (ref 0–2)
BILIRUB SERPL-MCNC: 0.4 MG/DL — SIGNIFICANT CHANGE UP (ref 0.2–1.2)
BUN SERPL-MCNC: 9 MG/DL — SIGNIFICANT CHANGE UP (ref 7–23)
CALCIUM SERPL-MCNC: 9 MG/DL — SIGNIFICANT CHANGE UP (ref 8.4–10.5)
CHLORIDE SERPL-SCNC: 101 MMOL/L — SIGNIFICANT CHANGE UP (ref 96–108)
CO2 SERPL-SCNC: 24 MMOL/L — SIGNIFICANT CHANGE UP (ref 22–31)
CREAT SERPL-MCNC: 0.71 MG/DL — SIGNIFICANT CHANGE UP (ref 0.5–1.3)
EGFR: 89 ML/MIN/1.73M2 — SIGNIFICANT CHANGE UP
EGFR: 89 ML/MIN/1.73M2 — SIGNIFICANT CHANGE UP
EOSINOPHIL # BLD AUTO: 0 K/UL — SIGNIFICANT CHANGE UP (ref 0–0.5)
EOSINOPHIL NFR BLD AUTO: 0 % — SIGNIFICANT CHANGE UP (ref 0–6)
GI PCR PANEL: SIGNIFICANT CHANGE UP
GLUCOSE SERPL-MCNC: 123 MG/DL — HIGH (ref 70–99)
HCT VFR BLD CALC: 37.4 % — SIGNIFICANT CHANGE UP (ref 34.5–45)
HGB BLD-MCNC: 12.7 G/DL — SIGNIFICANT CHANGE UP (ref 11.5–15.5)
LACTATE SERPL-SCNC: 1.7 MMOL/L — SIGNIFICANT CHANGE UP (ref 0.5–2)
LYMPHOCYTES # BLD AUTO: 0.7 K/UL — LOW (ref 1–3.3)
LYMPHOCYTES # BLD AUTO: 3.5 % — LOW (ref 13–44)
MAGNESIUM SERPL-MCNC: 2.4 MG/DL — SIGNIFICANT CHANGE UP (ref 1.6–2.6)
MCHC RBC-ENTMCNC: 30 PG — SIGNIFICANT CHANGE UP (ref 27–34)
MCHC RBC-ENTMCNC: 34 G/DL — SIGNIFICANT CHANGE UP (ref 32–36)
MCV RBC AUTO: 88.2 FL — SIGNIFICANT CHANGE UP (ref 80–100)
MONOCYTES # BLD AUTO: 1.77 K/UL — HIGH (ref 0–0.9)
MONOCYTES NFR BLD AUTO: 8.8 % — SIGNIFICANT CHANGE UP (ref 2–14)
NEUTROPHILS # BLD AUTO: 17.59 K/UL — HIGH (ref 1.8–7.4)
NEUTROPHILS NFR BLD AUTO: 87.7 % — HIGH (ref 43–77)
PHOSPHATE SERPL-MCNC: 3.8 MG/DL — SIGNIFICANT CHANGE UP (ref 2.5–4.5)
PLATELET # BLD AUTO: 229 K/UL — SIGNIFICANT CHANGE UP (ref 150–400)
POTASSIUM SERPL-MCNC: 3.8 MMOL/L — SIGNIFICANT CHANGE UP (ref 3.5–5.3)
POTASSIUM SERPL-SCNC: 3.8 MMOL/L — SIGNIFICANT CHANGE UP (ref 3.5–5.3)
PROT SERPL-MCNC: 6.3 G/DL — SIGNIFICANT CHANGE UP (ref 6–8.3)
RBC # BLD: 4.24 M/UL — SIGNIFICANT CHANGE UP (ref 3.8–5.2)
RBC # FLD: 13.8 % — SIGNIFICANT CHANGE UP (ref 10.3–14.5)
SODIUM SERPL-SCNC: 138 MMOL/L — SIGNIFICANT CHANGE UP (ref 135–145)
WBC # BLD: 20.06 K/UL — HIGH (ref 3.8–10.5)
WBC # FLD AUTO: 20.06 K/UL — HIGH (ref 3.8–10.5)

## 2025-05-14 PROCEDURE — 99223 1ST HOSP IP/OBS HIGH 75: CPT

## 2025-05-14 PROCEDURE — 74176 CT ABD & PELVIS W/O CONTRAST: CPT | Mod: 26

## 2025-05-14 PROCEDURE — 99223 1ST HOSP IP/OBS HIGH 75: CPT | Mod: GC

## 2025-05-14 PROCEDURE — G0545: CPT

## 2025-05-14 RX ORDER — MELATONIN 5 MG
3 TABLET ORAL AT BEDTIME
Refills: 0 | Status: DISCONTINUED | OUTPATIENT
Start: 2025-05-14 | End: 2025-05-20

## 2025-05-14 RX ORDER — FIDAXOMICIN 200 MG/5ML
200 GRANULE, FOR SUSPENSION ORAL
Refills: 0 | Status: DISCONTINUED | OUTPATIENT
Start: 2025-05-14 | End: 2025-05-14

## 2025-05-14 RX ORDER — ENOXAPARIN SODIUM 100 MG/ML
30 INJECTION SUBCUTANEOUS EVERY 24 HOURS
Refills: 0 | Status: DISCONTINUED | OUTPATIENT
Start: 2025-05-14 | End: 2025-05-20

## 2025-05-14 RX ORDER — VANCOMYCIN HCL IN 5 % DEXTROSE 1.5G/250ML
500 PLASTIC BAG, INJECTION (ML) INTRAVENOUS EVERY 6 HOURS
Refills: 0 | Status: DISCONTINUED | OUTPATIENT
Start: 2025-05-14 | End: 2025-05-20

## 2025-05-14 RX ORDER — FIDAXOMICIN 200 MG/5ML
200 GRANULE, FOR SUSPENSION ORAL ONCE
Refills: 0 | Status: COMPLETED | OUTPATIENT
Start: 2025-05-14 | End: 2025-05-14

## 2025-05-14 RX ORDER — SODIUM CHLORIDE 9 G/1000ML
1000 INJECTION, SOLUTION INTRAVENOUS ONCE
Refills: 0 | Status: COMPLETED | OUTPATIENT
Start: 2025-05-14 | End: 2025-05-14

## 2025-05-14 RX ORDER — ACETAMINOPHEN 500 MG/5ML
725 LIQUID (ML) ORAL ONCE
Refills: 0 | Status: COMPLETED | OUTPATIENT
Start: 2025-05-14 | End: 2025-05-14

## 2025-05-14 RX ORDER — LACTOBACILLUS ACIDOPHILUS/PECT 75 MM-100
1 CAPSULE ORAL DAILY
Refills: 0 | Status: DISCONTINUED | OUTPATIENT
Start: 2025-05-14 | End: 2025-05-14

## 2025-05-14 RX ORDER — ACETAMINOPHEN 500 MG/5ML
650 LIQUID (ML) ORAL EVERY 6 HOURS
Refills: 0 | Status: DISCONTINUED | OUTPATIENT
Start: 2025-05-14 | End: 2025-05-20

## 2025-05-14 RX ORDER — KETOROLAC TROMETHAMINE 30 MG/ML
15 INJECTION, SOLUTION INTRAMUSCULAR; INTRAVENOUS ONCE
Refills: 0 | Status: DISCONTINUED | OUTPATIENT
Start: 2025-05-14 | End: 2025-05-14

## 2025-05-14 RX ORDER — METRONIDAZOLE 250 MG
500 TABLET ORAL EVERY 8 HOURS
Refills: 0 | Status: DISCONTINUED | OUTPATIENT
Start: 2025-05-14 | End: 2025-05-18

## 2025-05-14 RX ORDER — ONDANSETRON HCL/PF 4 MG/2 ML
4 VIAL (ML) INJECTION EVERY 8 HOURS
Refills: 0 | Status: DISCONTINUED | OUTPATIENT
Start: 2025-05-14 | End: 2025-05-20

## 2025-05-14 RX ADMIN — Medication 100 MILLIGRAM(S): at 22:00

## 2025-05-14 RX ADMIN — Medication 650 MILLIGRAM(S): at 09:05

## 2025-05-14 RX ADMIN — Medication 20 MILLIGRAM(S): at 17:53

## 2025-05-14 RX ADMIN — Medication 650 MILLIGRAM(S): at 09:35

## 2025-05-14 RX ADMIN — Medication 500 MILLIGRAM(S): at 20:53

## 2025-05-14 RX ADMIN — KETOROLAC TROMETHAMINE 15 MILLIGRAM(S): 30 INJECTION, SOLUTION INTRAMUSCULAR; INTRAVENOUS at 02:39

## 2025-05-14 RX ADMIN — Medication 500 MILLIGRAM(S): at 13:49

## 2025-05-14 RX ADMIN — FIDAXOMICIN 200 MILLIGRAM(S): 200 GRANULE, FOR SUSPENSION ORAL at 04:16

## 2025-05-14 RX ADMIN — Medication 290 MILLIGRAM(S): at 02:39

## 2025-05-14 RX ADMIN — Medication 1 TABLET(S): at 04:16

## 2025-05-14 RX ADMIN — Medication 100 MILLIGRAM(S): at 13:48

## 2025-05-14 RX ADMIN — SODIUM CHLORIDE 1000 MILLILITER(S): 9 INJECTION, SOLUTION INTRAVENOUS at 02:40

## 2025-05-14 NOTE — H&P ADULT - PROBLEM SELECTOR PLAN 3
Pt post menopausal for which she takes estrace 0.5mg qd, progesterone 20mg every 3 days. Patient will have her  bring in home medications.    - restart home meds once  brings to the hospital.

## 2025-05-14 NOTE — ED PROVIDER NOTE - OBJECTIVE STATEMENT
75yF w/ pmhx diverticulitis, ischemic colitis, recent admit 5/9-13/25 for c. dif being treated w/ PO vancomycin, comes in for eval. States her diarrhea was improving but today began feeling worse. Has been compliant w/ PO vancomycin, took dose tonight. Reports today had >13 BMs (not large volume), also reports feeling presyncopal and lightheaded. States she has been hydrating a lot. Reports subjective chills and weakness, mild LLQ pain as well.  Also says she checked her pulse ox at home and SpO2 was 91% and then came back up to >95%, denies chest pain or SOB.

## 2025-05-14 NOTE — CONSULT NOTE ADULT - SUBJECTIVE AND OBJECTIVE BOX
INFECTIOUS DISEASES INITIAL CONSULT NOTE    HPI:  75F w/ hx diverticulitis, prior episode of ischemic colitis in 2016, and recent admission 4/6-11/25 for colitis, possibly ischemic, managed conservatively with IVF, bowel rest, and 5 days of ceftriaxone/flagyl. Then admitted 5/9-12/2025 with worsening diarrhea (was having 30 BM/day, loose, with cramping and chills), given a dose of cipro/flagyl but ultimately found to have CDI colitis (severe with WBC 19k), CTAP w/ PO contrast normal, treated with PO vanc and diarrhea/cramping/chills all resolved, WBC normalized and patient discharged home. Then came back to the following day (5/13) with worsening diarrhea (17 BM/day, worsening LLQ pain/cramping, recurrent chills and lightheadedness). WBC up to 17.5k -> 20k, repeat CTAP w/ PO contrast now with wall thickening of descending/rectosigmoid colon and marked stool throughout the large bowel concerning for fecal stasis. Lactate and vital signs normal. Given a dose of fidaxomicin overnight – called for antimicrobial approval for this but was recommended instead to obtain ID consult.         PAST MEDICAL & SURGICAL HISTORY:        Review of Systems:   Constitutional, eyes, ENT, cardiovascular, respiratory, gastrointestinal, genitourinary, integumentary, neurological, psychiatric and heme/lymph are otherwise negative other than noted above       ANTIBIOTICS:  MEDICATIONS  (STANDING):  dicyclomine 20 milliGRAM(s) Oral three times a day before meals  enoxaparin Injectable 30 milliGRAM(s) SubCutaneous every 24 hours  metroNIDAZOLE  IVPB 500 milliGRAM(s) IV Intermittent every 8 hours  vancomycin    Solution 500 milliGRAM(s) Oral every 6 hours    MEDICATIONS  (PRN):  acetaminophen     Tablet .. 650 milliGRAM(s) Oral every 6 hours PRN Temp greater or equal to 38C (100.4F), Mild Pain (1 - 3)  melatonin 3 milliGRAM(s) Oral at bedtime PRN Insomnia  ondansetron Injectable 4 milliGRAM(s) IV Push every 8 hours PRN Nausea and/or Vomiting      Allergies    sulfa drugs (Swelling)  penicillins (Flushing)  [place] (Vomiting)  Turmeric (Unknown)  IV Contrast (Other)    Intolerances        SOCIAL HISTORY:  Lives in [facility]    FAMILY HISTORY:   no FH leading to current infection    Vital Signs Last 24 Hrs  T(C): 36.7 (14 May 2025 09:08), Max: 37.6 (13 May 2025 22:18)  T(F): 98.1 (14 May 2025 09:08), Max: 99.6 (13 May 2025 22:18)  HR: 89 (14 May 2025 09:08) (80 - 101)  BP: 133/62 (14 May 2025 09:08) (118/57 - 148/67)  BP(mean): --  RR: 16 (14 May 2025 09:08) (16 - 18)  SpO2: 96% (14 May 2025 09:08) (95% - 97%)    Parameters below as of 14 May 2025 09:08  Patient On (Oxygen Delivery Method): room air          PHYSICAL EXAM:  Constitutional: alert, NAD, non-toxic  Eyes: the sclera and conjunctiva were normal.   ENT: the ears and nose were normal in appearance.   Neck: the appearance of the neck was normal and the neck was supple.   Pulmonary: no respiratory distress  Heart: heart rate was normal and rhythm regular  Abdomen: soft, moderate tenderness in LLQ  Neurological: no focal deficits.   Psychiatric: the affect was normal  Skin: no rash      LABS:                        12.7   20.06 )-----------( 229      ( 14 May 2025 06:30 )             37.4     05-14    138  |  101  |  9   ----------------------------<  123[H]  3.8   |  24  |  0.71    Ca    9.0      14 May 2025 06:30  Phos  3.8     05-14  Mg     2.4     05-14    TPro  6.3  /  Alb  3.8  /  TBili  0.4  /  DBili  x   /  AST  22  /  ALT  17  /  AlkPhos  74  05-14      Urinalysis Basic - ( 14 May 2025 06:30 )    Color: x / Appearance: x / SG: x / pH: x  Gluc: 123 mg/dL / Ketone: x  / Bili: x / Urobili: x   Blood: x / Protein: x / Nitrite: x   Leuk Esterase: x / RBC: x / WBC x   Sq Epi: x / Non Sq Epi: x / Bacteria: x        MICROBIOLOGY:  Reviewed    RADIOLOGY & ADDITIONAL STUDIES:  Reviewed

## 2025-05-14 NOTE — H&P ADULT - PROBLEM SELECTOR PLAN 1
Patient with recent admission 5/9 - 5/13 for the same problem. Has been discharged on oral Vancomycin as she was improving inpatient. Now she is back with worsening diarrhea, up trending WBC count. CT A/P with large stool burden, worsening of the colitis.    - Switch to Fidaxomycin for 10 days   - Monitor for volume depletion, can give fluids Patient with recent admission 5/9 - 5/13 for the same problem. Has been discharged on oral Vancomycin as she was improving inpatient. Now she is back with worsening diarrhea, up trending WBC count. CT A/P with large stool burden, worsening of the colitis.    - Switch to Fidaxomycin for 10 days   - Monitor for volume depletion, can give fluids  - Check Lactate Patient with recent admission 5/9 - 5/13 for the same problem. Has been discharged on oral Vancomycin as she was improving inpatient. Now she is back with worsening diarrhea, up trending WBC count. CT A/P with large stool burden, worsening of the colitis.    - Switch to Fidaxomycin for 10 days   - Lactobacillus daily   - Monitor for volume depletion, can give fluids  - Check Lactate

## 2025-05-14 NOTE — H&P ADULT - NSHPLABSRESULTS_GEN_ALL_CORE
LABS:                         13.8   17.45 )-----------( 225      ( 13 May 2025 21:38 )             41.5     05-13    139  |  98  |  10  ----------------------------<  128[H]  3.6   |  25  |  0.78    Ca    10.1      13 May 2025 21:38  Phos  2.6     05-12  Mg     1.7     05-13        Urinalysis Basic - ( 13 May 2025 21:38 )    Color: x / Appearance: x / SG: x / pH: x  Gluc: 128 mg/dL / Ketone: x  / Bili: x / Urobili: x   Blood: x / Protein: x / Nitrite: x   Leuk Esterase: x / RBC: x / WBC x   Sq Epi: x / Non Sq Epi: x / Bacteria: x            RADIOLOGY, EKG & ADDITIONAL TESTS: Reviewed.

## 2025-05-14 NOTE — ED ADULT NURSE REASSESSMENT NOTE - NS ED NURSE REASSESS COMMENT FT1
Pt reports she has gone to the bathroom at least 20 times since she has been in the ER. a mix of loose and a couple formed stools. requesting a antispasmotic. Will let MD know.

## 2025-05-14 NOTE — CONSULT NOTE ADULT - SUBJECTIVE AND OBJECTIVE BOX
76yo  w/ PMH diverticulitis, ischemic colitis (2016, recent admit to [facility] 4/6/25-4/10/25 under Dr. Isaac) and [facility] adenoidectomy, recent admission on 5/9 to medicine service for sepsis 2/2 c diff colitis, was managed with PO vanc (until 5.18), surgery was consulted for c/f recurrent ischemic colitis, CT A/P at that time showed no acute abn in abd/pelvis, team 4 signed off at discharge. Patient now re-admitted to medicine service for continued diarrhea, worsenign fatigue, feelings of presyncope, WBC 17, CT showing wall thickening of desc/rectosigmoid colon c/w colitis, marked stool throughout large bowel, fecal stasis, now being m/w 10 day course of Fidaxomycin, ID on board, general surgery consulted for CT findings demosntrating wall thickening, continued colitis.    Labs demonstrate WBC 20 w/ LT shift, CMP WNL.    CTAP showing wall thickening involving the descending and rectosigmoid colon consistent with colitis. Marked stool throughout the large bowel most pronounced within the right and transverse colon consistent with fecal stasis.    PMH: Diverticulitis; Ischemic colitis  PSHx: adenoidectomy; hystereoscopy  Medications: 0.5mg Estrogen daily and 100mg Progesterone - every 3rd day (for post menopausal); ASA 81 q3 days  Allergies: NKDA; IV contrast allergy (hives)  Social Hx: quit smoking >20 years ago  Family Hx: Denies family hx of IBS, Crohn's, UC. Colon cancer in her father. Pancreatic cancer in grandfather  Last colonoscopy: 2022 s/p polypectomy but otherwise normal  Last EGD: 1 year ago, revealed Schatzki ring    T(C): 36.7 (05-14-25 @ 09:08), Max: 37.6 (05-13-25 @ 22:18)  HR: 89 (05-14-25 @ 09:08) (80 - 101)  BP: 133/62 (05-14-25 @ 09:08) (118/57 - 148/67)  RR: 16 (05-14-25 @ 09:08) (16 - 18)  SpO2: 96% (05-14-25 @ 09:08) (95% - 97%)    Physical Exam  General: AAOx3, NAD, laying comfortably in bed  Cardio: S1,S2, No MRG  Pulm: Nonlabored breathing  Abdomen: Soft, non-tender, non-distended, no rebound/guarding   Extremities: WWP, peripheral pulses appreciated   76yo  w/ PMH diverticulitis, ischemic colitis (2016, recent admit to [facility] 4/6/25-4/10/25 under Dr. Isaac) and [facility] adenoidectomy, recent admission on 5/9 to medicine service for sepsis 2/2 c diff colitis, was managed with PO vanc (until 5.18), surgery was consulted for c/f recurrent ischemic colitis, CT A/P at that time showed no acute abn in abd/pelvis, team 4 signed off at discharge. Patient now re-admitted to medicine service for continued diarrhea, worsening fatigue, feelings of presyncope, WBC 17, CT showing wall thickening of desc/rectosigmoid colon c/w colitis, marked stool throughout large bowel, fecal stasis, now being m/w 10 day course of Fidaxomicin ID on board, general surgery consulted for CT findings demonstrating wall thickening, continued colitis.    Labs demonstrate WBC 20 w/ LT shift, CMP WNL.    CTAP showing wall thickening involving the descending and rectosigmoid colon consistent with colitis. Marked stool throughout the large bowel most pronounced within the right and transverse colon consistent with fecal stasis.    PMH: Diverticulitis; Ischemic colitis  PSHx: adenoidectomy; hysteroscopy  Medications: 0.5mg Estrogen daily and 100mg Progesterone - every 3rd day (for post menopausal); ASA 81 q3 days  Allergies: NKDA; IV contrast allergy (hives)  Social Hx: quit smoking >20 years ago  Family Hx: Denies family hx of IBS, Crohn's, UC. Colon cancer in her father. Pancreatic cancer in grandfather  Last colonoscopy: 2022 s/p polypectomy but otherwise normal  Last EGD: 1 year ago, revealed Schatzki ring    T(C): 36.7 (05-14-25 @ 09:08), Max: 37.6 (05-13-25 @ 22:18)  HR: 89 (05-14-25 @ 09:08) (80 - 101)  BP: 133/62 (05-14-25 @ 09:08) (118/57 - 148/67)  RR: 16 (05-14-25 @ 09:08) (16 - 18)  SpO2: 96% (05-14-25 @ 09:08) (95% - 97%)    Physical Exam  General: AAOx3, NAD, laying comfortably in bed  Cardio: S1,S2, No MRG  Pulm: Nonlabored breathing  Abdomen: Soft, non-tender, non-distended, no rebound/guarding   Extremities: WWP, peripheral pulses appreciated   74yo  w/ PMH diverticulitis, ischemic colitis (2016, recent admit to [facility] 4/6/25-4/10/25 under Dr. Isaac) and [facility] adenoidectomy, recent admission on 5/9 to medicine service for sepsis 2/2 c diff colitis, was managed with PO vanc (until 5.18), surgery was consulted for c/f recurrent ischemic colitis, CT A/P at that time showed no acute abn in abd/pelvis, team 4 signed off at discharge. Patient now re-admitted to medicine service for continued diarrhea, worsening fatigue, feelings of presyncope, WBC 17, CT showing wall thickening of desc/rectosigmoid colon c/w colitis, marked stool throughout large bowel, fecal stasis, now being m/w 10 day course of Fidaxomicin ID on board, general surgery consulted for CT findings demonstrating wall thickening, continued colitis.    Labs demonstrate WBC 20 w/ LT shift, CMP WNL.    CTAP showing wall thickening involving the descending and rectosigmoid colon consistent with colitis. Marked stool throughout the large bowel most pronounced within the right and transverse colon consistent with fecal stasis.    PMH: Diverticulitis; Ischemic colitis  PSHx: adenoidectomy; hysteroscopy  Medications: 0.5mg Estrogen daily and 100mg Progesterone - every 3rd day (for post menopausal); ASA 81 q3 days  Allergies: NKDA; IV contrast allergy (hives)  Social Hx: quit smoking >20 years ago  Family Hx: Denies family hx of IBS, Crohn's, UC. Colon cancer in her father. Pancreatic cancer in grandfather  Last colonoscopy: 2022 s/p polypectomy but otherwise normal  Last EGD: 1 year ago, revealed Schatzki ring    T(C): 36.7 (05-14-25 @ 09:08), Max: 37.6 (05-13-25 @ 22:18)  HR: 89 (05-14-25 @ 09:08) (80 - 101)  BP: 133/62 (05-14-25 @ 09:08) (118/57 - 148/67)  RR: 16 (05-14-25 @ 09:08) (16 - 18)  SpO2: 96% (05-14-25 @ 09:08) (95% - 97%)    Physical Exam  General: AAOx3, NAD, laying comfortably in bed  Cardio: S1,S2, No MRG  Pulm: Nonlabored breathing  Abdomen: Soft, non-tender, non-distended, no rebound/guarding   Extremities: WWP, peripheral pulses appreciated                            12.7   20.06 )-----------( 229      ( 14 May 2025 06:30 )             37.4    74yo  w/ PMH diverticulitis, ischemic colitis (2016, recent admit to [facility] 4/6/25-4/10/25 under Dr. Isaac) and [facility] adenoidectomy, recent admission on 5/9 to medicine service for sepsis 2/2 c diff colitis, was managed with PO vanc (until 5.18), surgery was consulted for c/f recurrent ischemic colitis, CT A/P at that time showed no acute abn in abd/pelvis, team 4 signed off at discharge. Patient now re-admitted to medicine service for continued diarrhea, worsening fatigue, feelings of presyncope, WBC 17, CT showing wall thickening of desc/rectosigmoid colon c/w colitis, marked stool throughout large bowel, fecal stasis, now being m/w 10 day course of Fidaxomicin ID on board, general surgery consulted for CT findings demonstrating wall thickening, continued colitis.    Labs demonstrate WBC 20 w/ LT shift, CMP WNL.    CTAP showing wall thickening involving the descending and rectosigmoid colon consistent with colitis. Marked stool throughout the large bowel most pronounced within the right and transverse colon consistent with fecal stasis.    PMH: Diverticulitis; Ischemic colitis  PSHx: adenoidectomy; hysteroscopy  Medications: 0.5mg Estrogen daily and 100mg Progesterone - every 3rd day (for post menopausal); ASA 81 q3 days  Allergies: NKDA; IV contrast allergy (hives)  Social Hx: quit smoking >20 years ago  Family Hx: Denies family hx of IBS, Crohn's, UC. Colon cancer in her father. Pancreatic cancer in grandfather  Last colonoscopy: 2022 s/p polypectomy but otherwise normal  Last EGD: 1 year ago, revealed Schatzki ring    T(C): 36.7 (05-14-25 @ 09:08), Max: 37.6 (05-13-25 @ 22:18)  HR: 89 (05-14-25 @ 09:08) (80 - 101)  BP: 133/62 (05-14-25 @ 09:08) (118/57 - 148/67)  RR: 16 (05-14-25 @ 09:08) (16 - 18)  SpO2: 96% (05-14-25 @ 09:08) (95% - 97%)    Physical Exam  General: AAOx3, NAD, laying comfortably in bed  Cardio: S1,S2, No MRG  Pulm: Nonlabored breathing  Abdomen: Soft, non-tender, non-distended, no rebound/guarding   Extremities: WWP, peripheral pulses appreciated    LABS:                        12.7   20.06 )-----------( 229      ( 14 May 2025 06:30 )             37.4     05-14    138  |  101  |  9   ----------------------------<  123[H]  3.8   |  24  |  0.71    Ca    9.0      14 May 2025 06:30  Phos  3.8     05-14  Mg     2.4     05-14    TPro  6.3  /  Alb  3.8  /  TBili  0.4  /  DBili  x   /  AST  22  /  ALT  17  /  AlkPhos  74  05-14

## 2025-05-14 NOTE — CONSULT NOTE ADULT - ATTENDING COMMENTS
Patient seen and examined, imaging reviewed.    C diff treated recently but with rapid worsening of symptoms shortly after discharge, primarily "feeling worse" without worsening pain.  No BPR. Variable and inconsistent BMs but continues to have bowel function.  Some persistent discomfort in LLQ, but fairly mild.    AFVSS  Non-toxic  Abd soft, ND. mild TTP LLQ without much guarding.    WBC 20K  Lactate 1.7    CTAP demonstrates L sided colitis, solid stool in colon.  No obstruction or ileus.    Imp:  C diff colitis, non-toxic.  Unclear if she has failed vancomycin or insufficient dose was given.  Rec:   1. OK to give vancomycin enemas in addition to oral vanco and IV flagyl  2. Consider fidaxomicin   3. Serial abdominal exams.  4. Discussed escalating care paradigm, indication for surgery.  5. Cautious use of antispasmodic - do not want to slow BMs.

## 2025-05-14 NOTE — H&P ADULT - NSHPPHYSICALEXAM_GEN_ALL_CORE
PHYSICAL EXAM:  GENERAL: NAD, lying in bed comfortably  HEAD:  Atraumatic, Normocephalic  EYES: EOMI, PERRLA, conjunctiva and sclera clear  ENT: Moist mucous membranes  NECK: Supple, No JVD  CHEST/LUNG: Clear to auscultation bilaterally; No rales, rhonchi, wheezing, or rubs. Unlabored respirations  HEART: Regular rate and rhythm; systolic ejection murmur   ABDOMEN: Bowel sounds present; Soft, Nondistended. No hepatomegally, mild left LQ tenderness  EXTREMITIES:  2+ Peripheral Pulses, brisk capillary refill. No clubbing, cyanosis, or edema  NERVOUS SYSTEM:  Alert & Oriented X3, speech clear. No deficits   MSK: FROM all 4 extremities, full and equal strength  SKIN: No rashes or lesions

## 2025-05-14 NOTE — ED PROVIDER NOTE - CLINICAL SUMMARY MEDICAL DECISION MAKING FREE TEXT BOX
Triage VS -  otherwise normal  orthostatics negative  on exam abdomen soft minimally tender in LLQ no rebound/guarding. breathing comfortably on room air w/ clear BS, b/l LE no edema or asymmetry    A/P- check labs/electrolytes, IV fluids, CXR, reassess  -->Labs w/ WBC 17.45 from 6.05 yesterday. Left shift w/ neutrophils 83.3%. K 3.6, Mg 1.7. Ordered for CTAP given increase in WBC- shows "Wall thickening involving the descending and rectosigmoid colon consistent with colitis. Marked stool throughout the large bowel most pronounced within the right and transverse colon consistent with fecal stasis."  CXR on my wet read w/o acute disease in the chest. Pt states she does not feel well and cannot go home in this condition.

## 2025-05-14 NOTE — H&P ADULT - NSHPREVIEWOFSYSTEMS_GEN_ALL_CORE
REVIEW OF SYSTEMS:    EYES/ENT: No visual changes;  No vertigo or throat pain   NECK: No pain or stiffness  RESPIRATORY: No cough, wheezing, hemoptysis; No shortness of breath  CARDIOVASCULAR: No chest pain or palpitations  GENITOURINARY: No dysuria, frequency or hematuria  NEUROLOGICAL: No numbness or weakness  SKIN: No itching, rashes

## 2025-05-14 NOTE — H&P ADULT - PROBLEM SELECTOR PLAN 4
Fluids: as needed  Electrolytes: replete as needed  Diet: CLD can advance as tolerated  DVT: Lovenox  Code: full  Dispo: [facility]

## 2025-05-14 NOTE — H&P ADULT - ASSESSMENT
75yF w/ pmhx diverticulitis, ischemic colitis, recent admit 5/9-13/25 for c. dif being treated w/ PO vancomycin, comes in for worsening diarrhea. Found to have uptrending WBC count. CT A/P with large stool burden and colitis. Admitted for further management.

## 2025-05-14 NOTE — ED PROVIDER NOTE - PHYSICAL EXAMINATION
CONST: nontoxic NAD speaking in full sentences  HEAD: atraumatic  EYES: conjunctivae clear  NECK: supple  CARD: regular rate  CHEST: breathing comfortably, no stridor/retractions/tripoding, clear BS  ABD: soft minimally tender in LLQ no rebound/guarding  EXT: FROM  SKIN: warm, dry  NEURO: awake alert answering questions following commands moving all extremities

## 2025-05-14 NOTE — CONSULT NOTE ADULT - ASSESSMENT
# CDI, first episode, severe  - Patient initially had clinical improvement on vancomycin 125 q6h with resolution of diarrhea as of 5/12, but despite good adherence to PO vanc upon discharge and no additional systemic abx exposure sine that time, her symptoms again worsened on 5/13, with associated rising WBC and worsened colitis/fecal stasis on CTAP. Clinically does not have toxic megacolon or ileus, but I recommend aggressive antimicrobial treatment aimed to avoid these complications.    Recommendations:  - Stop fidaxomicin  - Start vancomycin 500mg PO q6h and flagyl 500mg IV q8h  - Recommend surgical consultation (patient follows with Dr. Isaac) given severity of infection. Would consider vancomycin enemas, but reasonable to try above higher dose PO/IV regimen first, as patient is tolerating diet without nausea/vomiting, and is having BMs.    ID Team 2 will follow

## 2025-05-14 NOTE — H&P ADULT - ATTENDING COMMENTS
75yF w/ pmhx diverticulitis, ischemic colitis, recent admit 5/9-13/25 for c. dif being treated w/ PO vancomycin, comes in for worsening diarrhea. Found to have uptrending WBC count. CT A/P with large stool burden and colitis. Admitted for further management.     Labs and imaging reviewed    Problem List  #Severe C. dif   #Sepsis POA  #Ischemic Colitis    Plan  -Continue PO Vanc/IV Flagyl  -Gen surg and ID consulted  -Patient can hold off on Vanc Enema for now pending input from surg  -Antispasmodic for pain   Rest as above

## 2025-05-14 NOTE — H&P ADULT - HISTORY OF PRESENT ILLNESS
75yF w/ pmhx diverticulitis, ischemic colitis, recent admit 5/9-13/25 for c. dif being treated w/ PO vancomycin, comes in for eval. States her diarrhea was improving but today began feeling worse. Has been compliant w/ PO vancomycin, took dose tonight. Reports today had >13 BMs (not large volume), also reports feeling presyncopal and lightheaded. States she has been hydrating a lot. Reports subjective chills and weakness, mild LLQ pain as well., denies chest pain or SOB.  Pt saw Dr. Isaac in mid-April 1w post discharge and felt well at the time. Patient reports having a colonoscopy last in 2022 that revealed polyps that were removed but otherwise normal. Last EGD was a year ago and it revealed a Schatzki ring. Denies personal and family history of UC/Crohn's. Denies history of colon cancer. Reports family history of colon cancer in her father and pancreatic cancer in her grandfather.     In the ED:  Initial vital signs: T: 98.1 F, HR: 101 , BP: 143/83 , R: 18, SpO2: 97 % on RA, orthostatics negative  ED course:   Labs: significant for WBC 17, Neutrophil% 83,   Imaging:  CT A/P with oral contrast: Wall thickening involving the descending and rectosigmoid colon consistent with colitis. Marked stool throughout the large bowel most pronounced within the right and transverse colon consistent with fecal stasis.  Medications: 2g MgSO4, 1 L LR

## 2025-05-14 NOTE — PATIENT PROFILE ADULT - FALL HARM RISK - HARM RISK INTERVENTIONS

## 2025-05-14 NOTE — CONSULT NOTE ADULT - ASSESSMENT
75F w/ recent admission for sepsis 2/2 c. diff colitis, now re-admitted to medicine service for continued episodes of diarrhea and presyncopal sensation. Surgery consulted for concern of fulminant colitis, toxic megacolon. Overall low clinical suspicison for fulminant colitis/toxic megacolon given normal vitals, unremarkable abdominal exam. CT scan findings of thickening of descenidng & rectosigmoid colon likely due to continued c diff infection. No acute surgical intervention is recommended at this time.     -No acute surgical intervention is recommended at this time  -Continue with antiobiotic regimen as recommended by infectious disease  -Surgery Team 4C will continue to follow, please reach out with any questions or concerns  -Plan discussed with Dr. Isaac 75F w/ recent admission for sepsis 2/2 c. diff colitis, now re-admitted to medicine service for continued episodes of diarrhea and presyncopal sensation. Surgery consulted for concern of fulminant colitis, toxic megacolon. Overall low clinical suspicion for fulminant colitis/toxic megacolon given normal vitals, unremarkable abdominal exam. CT scan findings of thickening of descenidng & rectosigmoid colon likely due to continued c diff infection. No acute surgical intervention is recommended at this time.     -No acute surgical intervention is recommended at this time  -Continue with Antiobiotic regimen as recommended by infectious disease  -Surgery Team 4C will continue to follow, please reach out with any questions or concerns  -Plan discussed with Dr. Isaac 75F w/ recent admission for sepsis 2/2 c. diff colitis, now re-admitted to medicine service for continued episodes of diarrhea and presyncopal sensation. Surgery consulted for concern of fulminant colitis, toxic megacolon. Overall low clinical suspicion for fulminant colitis/toxic megacolon given normal vitals, unremarkable abdominal exam. CT scan findings of thickening of descending & rectosigmoid colon likely due to continued c diff infection. No acute surgical intervention is recommended at this time.     -No acute surgical intervention is recommended at this time  -Continue with Antiobiotic regimen as recommended by infectious disease  -Surgery Team 4C will continue to follow, please reach out with any questions or concerns  -Plan discussed with Dr. Isaac

## 2025-05-15 LAB
ALBUMIN SERPL ELPH-MCNC: 3.1 G/DL — LOW (ref 3.3–5)
ALP SERPL-CCNC: 67 U/L — SIGNIFICANT CHANGE UP (ref 40–120)
ALT FLD-CCNC: 15 U/L — SIGNIFICANT CHANGE UP (ref 10–45)
ANION GAP SERPL CALC-SCNC: 9 MMOL/L — SIGNIFICANT CHANGE UP (ref 5–17)
AST SERPL-CCNC: 18 U/L — SIGNIFICANT CHANGE UP (ref 10–40)
BASOPHILS # BLD AUTO: 0 K/UL — SIGNIFICANT CHANGE UP (ref 0–0.2)
BASOPHILS NFR BLD AUTO: 0 % — SIGNIFICANT CHANGE UP (ref 0–2)
BILIRUB SERPL-MCNC: 0.4 MG/DL — SIGNIFICANT CHANGE UP (ref 0.2–1.2)
BUN SERPL-MCNC: 9 MG/DL — SIGNIFICANT CHANGE UP (ref 7–23)
CALCIUM SERPL-MCNC: 8.3 MG/DL — LOW (ref 8.4–10.5)
CHLORIDE SERPL-SCNC: 99 MMOL/L — SIGNIFICANT CHANGE UP (ref 96–108)
CO2 SERPL-SCNC: 27 MMOL/L — SIGNIFICANT CHANGE UP (ref 22–31)
CREAT SERPL-MCNC: 0.82 MG/DL — SIGNIFICANT CHANGE UP (ref 0.5–1.3)
EGFR: 75 ML/MIN/1.73M2 — SIGNIFICANT CHANGE UP
EGFR: 75 ML/MIN/1.73M2 — SIGNIFICANT CHANGE UP
EOSINOPHIL # BLD AUTO: 0 K/UL — SIGNIFICANT CHANGE UP (ref 0–0.5)
EOSINOPHIL NFR BLD AUTO: 0 % — SIGNIFICANT CHANGE UP (ref 0–6)
GLUCOSE SERPL-MCNC: 178 MG/DL — HIGH (ref 70–99)
HCT VFR BLD CALC: 35.6 % — SIGNIFICANT CHANGE UP (ref 34.5–45)
HGB BLD-MCNC: 11.6 G/DL — SIGNIFICANT CHANGE UP (ref 11.5–15.5)
LYMPHOCYTES # BLD AUTO: 0.46 K/UL — LOW (ref 1–3.3)
LYMPHOCYTES # BLD AUTO: 3.5 % — LOW (ref 13–44)
MAGNESIUM SERPL-MCNC: 1.9 MG/DL — SIGNIFICANT CHANGE UP (ref 1.6–2.6)
MCHC RBC-ENTMCNC: 29.3 PG — SIGNIFICANT CHANGE UP (ref 27–34)
MCHC RBC-ENTMCNC: 32.6 G/DL — SIGNIFICANT CHANGE UP (ref 32–36)
MCV RBC AUTO: 89.9 FL — SIGNIFICANT CHANGE UP (ref 80–100)
MONOCYTES # BLD AUTO: 1.95 K/UL — HIGH (ref 0–0.9)
MONOCYTES NFR BLD AUTO: 14.8 % — HIGH (ref 2–14)
NEUTROPHILS # BLD AUTO: 10.63 K/UL — HIGH (ref 1.8–7.4)
NEUTROPHILS NFR BLD AUTO: 75.6 % — SIGNIFICANT CHANGE UP (ref 43–77)
NRBC BLD AUTO-RTO: SIGNIFICANT CHANGE UP /100 WBCS (ref 0–0)
PHOSPHATE SERPL-MCNC: 2.2 MG/DL — LOW (ref 2.5–4.5)
PLATELET # BLD AUTO: 208 K/UL — SIGNIFICANT CHANGE UP (ref 150–400)
POTASSIUM SERPL-MCNC: 3.2 MMOL/L — LOW (ref 3.5–5.3)
POTASSIUM SERPL-SCNC: 3.2 MMOL/L — LOW (ref 3.5–5.3)
PROT SERPL-MCNC: 5.3 G/DL — LOW (ref 6–8.3)
RBC # BLD: 3.96 M/UL — SIGNIFICANT CHANGE UP (ref 3.8–5.2)
RBC # FLD: 13.9 % — SIGNIFICANT CHANGE UP (ref 10.3–14.5)
SODIUM SERPL-SCNC: 135 MMOL/L — SIGNIFICANT CHANGE UP (ref 135–145)
WBC # BLD: 13.16 K/UL — HIGH (ref 3.8–10.5)
WBC # FLD AUTO: 13.16 K/UL — HIGH (ref 3.8–10.5)

## 2025-05-15 PROCEDURE — 99233 SBSQ HOSP IP/OBS HIGH 50: CPT | Mod: GC

## 2025-05-15 PROCEDURE — G0545: CPT

## 2025-05-15 PROCEDURE — 99233 SBSQ HOSP IP/OBS HIGH 50: CPT

## 2025-05-15 RX ORDER — SODIUM CHLORIDE 9 G/1000ML
1000 INJECTION, SOLUTION INTRAVENOUS
Refills: 0 | Status: DISCONTINUED | OUTPATIENT
Start: 2025-05-15 | End: 2025-05-20

## 2025-05-15 RX ORDER — VANCOMYCIN HCL IN 5 % DEXTROSE 1.5G/250ML
500 PLASTIC BAG, INJECTION (ML) INTRAVENOUS EVERY 6 HOURS
Refills: 0 | Status: DISCONTINUED | OUTPATIENT
Start: 2025-05-15 | End: 2025-05-17

## 2025-05-15 RX ORDER — POTASSIUM PHOSPHATE, MONOBASIC POTASSIUM PHOSPHATE, DIBASIC INJECTION, 236; 224 MG/ML; MG/ML
30 SOLUTION, CONCENTRATE INTRAVENOUS ONCE
Refills: 0 | Status: COMPLETED | OUTPATIENT
Start: 2025-05-15 | End: 2025-05-15

## 2025-05-15 RX ADMIN — SODIUM CHLORIDE 100 MILLILITER(S): 9 INJECTION, SOLUTION INTRAVENOUS at 12:04

## 2025-05-15 RX ADMIN — Medication 100 MILLIEQUIVALENT(S): at 12:04

## 2025-05-15 RX ADMIN — Medication 100 MILLIGRAM(S): at 13:45

## 2025-05-15 RX ADMIN — Medication 500 MILLIGRAM(S): at 15:20

## 2025-05-15 RX ADMIN — Medication 500 MILLIGRAM(S): at 23:19

## 2025-05-15 RX ADMIN — Medication 500 MILLIGRAM(S): at 09:01

## 2025-05-15 RX ADMIN — Medication 100 MILLIGRAM(S): at 22:57

## 2025-05-15 RX ADMIN — Medication 100 MILLIEQUIVALENT(S): at 13:45

## 2025-05-15 RX ADMIN — Medication 500 MILLIGRAM(S): at 15:21

## 2025-05-15 RX ADMIN — POTASSIUM PHOSPHATE, MONOBASIC POTASSIUM PHOSPHATE, DIBASIC INJECTION, 83.33 MILLIMOLE(S): 236; 224 SOLUTION, CONCENTRATE INTRAVENOUS at 17:14

## 2025-05-15 RX ADMIN — Medication 100 MILLIEQUIVALENT(S): at 15:20

## 2025-05-15 RX ADMIN — Medication 500 MILLIGRAM(S): at 21:26

## 2025-05-15 RX ADMIN — Medication 20 MILLIGRAM(S): at 21:26

## 2025-05-15 RX ADMIN — Medication 100 MILLIGRAM(S): at 06:04

## 2025-05-15 RX ADMIN — Medication 500 MILLIGRAM(S): at 03:02

## 2025-05-15 RX ADMIN — Medication 20 MILLIGRAM(S): at 12:03

## 2025-05-15 RX ADMIN — Medication 1 APPLICATION(S): at 12:03

## 2025-05-15 NOTE — CHART NOTE - NSCHARTNOTEFT_GEN_A_CORE
Evaluated the patient at 1400    Abdomen soft, mildly distended in the LLQ with tympany. Mild tenderness to palpation with some rebound tenderness. Evaluated the patient at 1400    Abdomen soft, mildly distended in the LLQ with tympany. Mild tenderness to palpation with some rebound tenderness.  Will stop dicyclomine, no change in her pain and may decrease bowel transit times but will try a single dose of hyoscyamine to see if she has significant effects and will then use sparingly.

## 2025-05-15 NOTE — PROGRESS NOTE ADULT - ASSESSMENT
75F w/ recent admission for sepsis 2/2 c. diff colitis, now re-admitted to medicine service for continued episodes of diarrhea and presyncopal sensation. Surgery consulted for concern of fulminant colitis, toxic megacolon. Overall low clinical suspicion for fulminant colitis/toxic megacolon given normal vitals, unremarkable abdominal exam. CT scan findings of thickening of descending & rectosigmoid colon likely due to continued c diff infection. No acute surgical intervention is recommended at this time.     Recommendation:    - No acute surgical intervention is recommended at this time  - Continue with PO Vancomycin and IV flagyl  - Patient would benefit from Vancomycin enemas while in house  - Antispasmodics not recommended, careful use in C. Diff patients  - Diet as tolerated  - Ambulation      Plan discussed with Dr. Isaac  General surgery team 4c will continue to follow

## 2025-05-15 NOTE — PROGRESS NOTE ADULT - ASSESSMENT
74yo  w/ PMH diverticulitis, ischemic colitis presenting with 3d hx of worsening diarrhea admitted for severe C diff colitis.

## 2025-05-15 NOTE — PROGRESS NOTE ADULT - SUBJECTIVE AND OBJECTIVE BOX
CHULA CRAWFORD  75y  Female      Patient is a 75y old  Female who presents with a chief complaint of C Diff diarrhea (15 May 2025 08:15)      INTERVAL HPI:  Still having multiple bowel movements, around 10 daily. Stable abdominal exam from yesterday with signs of focal peritonitis in the LLQ but otherwise mild tenderness. Tolerating her liquid diet.      REVIEW OF SYSTEMS:  Negative unless mentioned above    FAMILY HISTORY:    Vital Signs Last 24 Hrs  T(C): 37.3 (15 May 2025 09:05), Max: 37.3 (14 May 2025 16:05)  T(F): 99.2 (15 May 2025 09:05), Max: 99.2 (14 May 2025 16:05)  HR: 87 (15 May 2025 09:05) (86 - 90)  BP: 119/64 (15 May 2025 09:05) (108/66 - 124/66)  BP(mean): 80 (15 May 2025 05:54) (80 - 80)  RR: 19 (15 May 2025 09:05) (17 - 19)  SpO2: 98% (15 May 2025 09:05) (95% - 98%)    Parameters below as of 15 May 2025 09:05  Patient On (Oxygen Delivery Method): room air      sulfa drugs (Swelling)  penicillins (Flushing)  [place] (Vomiting)  IV Contrast (Other)      PHYSICAL EXAM:  GENERAL: No acute distress  HEAD:  Atraumatic, Normocephalic  EYES: Normal extraocular movements. Conjunctiva and sclera are normal  NECK: Supple. Normal thyroid. No lymphadenopathy  NERVOUS SYSTEM:  Alert & Oriented X3. Motor Strength 5/5 B/L upper and lower extremities; DTRs 2+ intact and symmetric.  CHEST/LUNG: No wheezing or crackles present.  CARDIAC: Regular rate and rhythm. No murmurs or rubs. Jugular venous pressure is normal.  ABDOMEN: LLQ tenderness to palpation with rebound tenderness, mild. Soft, mildly distended and tympanitic. Bowel sounds active.  EXTREMITIES:  2+ Peripheral Pulses, No clubbing, cyanosis, or edema.  SKIN: No rashes or lesions    Consultant(s) Notes Reviewed:  [x ] YES  [ ] NO  Care Discussed with Consultants/Other Providers [ x] YES  [ ] NO    LABS:                        11.6   13.16 )-----------( 208      ( 15 May 2025 10:19 )             35.6     05-15    135  |  99  |  9   ----------------------------<  178[H]  3.2[L]   |  27  |  0.82    Ca    8.3[L]      15 May 2025 10:19  Phos  2.2     05-15  Mg     1.9     05-15    TPro  5.3[L]  /  Alb  3.1[L]  /  TBili  0.4  /  DBili  x   /  AST  18  /  ALT  15  /  AlkPhos  67  05-15          Urinalysis Basic - ( 15 May 2025 10:19 )    Color: x / Appearance: x / SG: x / pH: x  Gluc: 178 mg/dL / Ketone: x  / Bili: x / Urobili: x   Blood: x / Protein: x / Nitrite: x   Leuk Esterase: x / RBC: x / WBC x   Sq Epi: x / Non Sq Epi: x / Bacteria: x          RADIOLOGY & ADDITIONAL TESTS:    Imaging Personally Reviewed:  [x] YES  [ ] NO  acetaminophen     Tablet .. 650 milliGRAM(s) Oral every 6 hours PRN  chlorhexidine 2% Cloths 1 Application(s) Topical daily  dicyclomine 20 milliGRAM(s) Oral three times a day before meals  enoxaparin Injectable 30 milliGRAM(s) SubCutaneous every 24 hours  lactated ringers. 1000 milliLiter(s) IV Continuous <Continuous>  melatonin 3 milliGRAM(s) Oral at bedtime PRN  metroNIDAZOLE  IVPB 500 milliGRAM(s) IV Intermittent every 8 hours  ondansetron Injectable 4 milliGRAM(s) IV Push every 8 hours PRN  potassium chloride  10 mEq/100 mL IVPB 10 milliEquivalent(s) IV Intermittent every 1 hour  potassium phosphate IVPB 30 milliMole(s) IV Intermittent once  vancomycin    Solution 500 milliGRAM(s) Oral every 6 hours  vancomycin  Retention Enema 500 milliGRAM(s) Rectal every 6 hours      HEALTH ISSUES - PROBLEM Dx:  Clostridium difficile colitis    Prophylactic measure    Sepsis    Post menopausal syndrome

## 2025-05-15 NOTE — PROGRESS NOTE ADULT - PROBLEM SELECTOR PLAN 2
Pt presenting with severe nonbloody diarrea, found to be (+) for c. diff on stool testing. Meets criteria for severe c. diff (WBC >15k). CTAP showing no acute abnormality in the abdomen and pelvis including megacolon, pt hemodynamically stable, no evidence of fulminant c. diff. Was admitted mid-april and was on ctx/flagyl during episode of ischemic colitis.    - hold home protonix  - C/w vancomycin  mg q6 x 10 days  - encourage PO intake  - tylenol and zofran PRN for pain/nausea  - pt will reach out to GI to move up her colonoscopy which was initially scheduled for June   - Stool count Pt presenting with severe nonbloody diarrea, found to be (+) for c. diff on stool testing. Meets criteria for severe c. diff (WBC >15k). CTAP showing no acute abnormality in the abdomen and pelvis including megacolon, pt hemodynamically stable, no evidence of fulminant c. diff. Was admitted mid-april and was on ctx/flagyl during episode of ischemic colitis.    - hold home protonix  - C/w vancomycin  mg q6 x 10 days  - encourage PO intake  - tylenol and zofran PRN for pain/nausea  - pt will reach out to GI to move up her colonoscopy which was initially scheduled for June   - Will start vancomycin retention enemas q6h Pt presenting with severe nonbloody diarrea, found to be (+) for c. diff on stool testing. Meets criteria for severe c. diff (WBC >15k). CTAP showing no acute abnormality in the abdomen and pelvis including megacolon, pt hemodynamically stable, no evidence of fulminant c. diff. Was admitted mid-april and was on ctx/flagyl during episode of ischemic colitis.    - hold home protonix  - C/w vancomycin  mg q6 x 10 days  - Metronidazole 500mg q8h IV  - encourage PO intake  - tylenol and zofran PRN for pain/nausea  - pt will reach out to GI to move up her colonoscopy which was initially scheduled for June   - Will start vancomycin retention enemas q6h

## 2025-05-15 NOTE — PROGRESS NOTE ADULT - SUBJECTIVE AND OBJECTIVE BOX
Crampy abdominal pain unchanged.  Diarrhea unchanged.  Took in broth  Did not receive vanco enemas  NAD  Abd soft, distended, mild TTP LLQ    no new labs    Imp: C diff colitis.   Rec:  1. Favor adding vanco enemas while she is in house  2. On po vanco, IV metronidazole  3. OK to feed as tolerated  4. Ambulate Crampy abdominal pain unchanged.  Diarrhea unchanged.  Took in broth  Did not receive vanco enemas  AFVSS  NAD  Abd soft, distended, mild TTP LLQ    no new labs    Imp: C diff colitis.   Rec:  1. Favor adding vanco enemas while she is in house  2. On po vanco, IV metronidazole  3. OK to feed as tolerated  4. Ambulate Crampy abdominal pain unchanged.  Diarrhea unchanged.  Some incontinence  Took in broth  Did not receive vanco enemas  AFVSS  NAD  Abd soft, distended, mild TTP LLQ    no new labs    Imp: C diff colitis.   Rec:  1. Favor adding vanco enemas while she is in house  2. On po vanco, IV metronidazole  3. OK to feed as tolerated  4. Ambulate

## 2025-05-15 NOTE — PROGRESS NOTE ADULT - SUBJECTIVE AND OBJECTIVE BOX
INTERVAL HPI/OVERNIGHT EVENTS: Admitted for C.Diff infection. Patient with complaints of ~10bm overnight, watery but with some solid components. Patient refers dicyclomine did not help her pain.     SUBJECTIVE:  Patient seen and examined on AM rounds. Afebrile, VSS. Patient refers diffuse abdominal pain, more marked on the lower abdomen; with mild TPP on lower abdomen Lt > Rt. No complaints of nausea or emesis. Continue with watery bowel movements.     MEDICATIONS  (STANDING):  chlorhexidine 2% Cloths 1 Application(s) Topical daily  dicyclomine 20 milliGRAM(s) Oral three times a day before meals  enoxaparin Injectable 30 milliGRAM(s) SubCutaneous every 24 hours  hyoscyamine SL 0.125 milliGRAM(s) SubLingual once  lactated ringers. 1000 milliLiter(s) (100 mL/Hr) IV Continuous <Continuous>  metroNIDAZOLE  IVPB 500 milliGRAM(s) IV Intermittent every 8 hours  potassium chloride  10 mEq/100 mL IVPB 10 milliEquivalent(s) IV Intermittent every 1 hour  potassium phosphate IVPB 30 milliMole(s) IV Intermittent once  vancomycin    Solution 500 milliGRAM(s) Oral every 6 hours  vancomycin  Retention Enema 500 milliGRAM(s) Rectal every 6 hours    MEDICATIONS  (PRN):  acetaminophen     Tablet .. 650 milliGRAM(s) Oral every 6 hours PRN Temp greater or equal to 38C (100.4F), Mild Pain (1 - 3)  melatonin 3 milliGRAM(s) Oral at bedtime PRN Insomnia  ondansetron Injectable 4 milliGRAM(s) IV Push every 8 hours PRN Nausea and/or Vomiting      Vital Signs Last 24 Hrs  T(C): 37.3 (15 May 2025 09:05), Max: 37.3 (14 May 2025 16:05)  T(F): 99.2 (15 May 2025 09:05), Max: 99.2 (14 May 2025 16:05)  HR: 87 (15 May 2025 09:05) (86 - 90)  BP: 119/64 (15 May 2025 09:05) (108/66 - 124/66)  BP(mean): 80 (15 May 2025 05:54) (80 - 80)  RR: 19 (15 May 2025 09:05) (17 - 19)  SpO2: 98% (15 May 2025 09:05) (95% - 98%)    Parameters below as of 15 May 2025 09:05  Patient On (Oxygen Delivery Method): room air        PHYSICAL EXAM:      Constitutional: A&Ox3, mild distress from abdominal pain  Respiratory: non labored breathing, no respiratory distress  Cardiovascular: NSR, RRR  Gastrointestinal: soft, mild TTP more marked on lower abdomen, Lt> Rt. Mild distention, mild guarding in lower abdomen. No rigidity.   Genitourinary: Voiding freely  Extremities: (-) edema      I&O's Detail    15 May 2025 07:01  -  15 May 2025 13:58  --------------------------------------------------------  IN:    IV PiggyBack: 150 mL    Lactated Ringers: 200 mL  Total IN: 350 mL    OUT:  Total OUT: 0 mL    Total NET: 350 mL          LABS:                        11.6   13.16 )-----------( 208      ( 15 May 2025 10:19 )             35.6     05-15    135  |  99  |  9   ----------------------------<  178[H]  3.2[L]   |  27  |  0.82    Ca    8.3[L]      15 May 2025 10:19  Phos  2.2     05-15  Mg     1.9     05-15    TPro  5.3[L]  /  Alb  3.1[L]  /  TBili  0.4  /  DBili  x   /  AST  18  /  ALT  15  /  AlkPhos  67  05-15      Urinalysis Basic - ( 15 May 2025 10:19 )    Color: x / Appearance: x / SG: x / pH: x  Gluc: 178 mg/dL / Ketone: x  / Bili: x / Urobili: x   Blood: x / Protein: x / Nitrite: x   Leuk Esterase: x / RBC: x / WBC x   Sq Epi: x / Non Sq Epi: x / Bacteria: x        RADIOLOGY & ADDITIONAL STUDIES:

## 2025-05-15 NOTE — PROGRESS NOTE ADULT - ATTENDING COMMENTS
75yF w/ pmhx diverticulitis, ischemic colitis, recent admit 5/9-13/25 for c. dif being treated w/ PO vancomycin, comes in for worsening diarrhea. Found to have uptrending WBC count. CT A/P with large stool burden and colitis. Admitted for further management.     Labs and imaging reviewed    Problem List  #Severe C. dif   #Sepsis POA  #Ischemic Colitis    Plan  -Continue PO Vanc/IV Flagyl  -Gen surg and ID consulted  -Start Vanc Enema today  -Leukocytosis improved    Rest as above

## 2025-05-15 NOTE — PROGRESS NOTE ADULT - PROBLEM SELECTOR PLAN 1
RESOLVED  Present on admission, HR > 90, WBC > 12, SBP drop > 40 2/2 sepsis    -Infectious source - C- diff  - c/w PO vanc RESOLVED  Present on admission, HR > 90, WBC > 12, SBP drop > 40 2/2 sepsis    -Infectious source - C- diff  - c/w PO vanc at 250mg q6h as below

## 2025-05-15 NOTE — PROGRESS NOTE ADULT - ASSESSMENT
75F w/ hx diverticulitis, prior episode of ischemic colitis in 2016, GERD on PPI, and recent admission 4/6-11/25 for colitis, possibly ischemic, managed conservatively with IVF, bowel rest, and 5 days of ceftriaxone/flagyl. Then admitted 5/9-12/2025 with worsening diarrhea (was having 30 BM/day, loose, with cramping and chills), given a dose of cipro/flagyl but ultimately found to have CDI colitis (severe with WBC 19k), CTAP w/ PO contrast normal, treated with PO vanc and diarrhea/cramping/chills all resolved, WBC normalized and patient discharged home. Then came back to the following day (5/13) with worsening diarrhea and LLQ. WBC up to 17.5k -> 20k, repeat CTAP w/ PO contrast with new wall thickening of descending/rectosigmoid colon and marked stool throughout the large bowel concerning for fecal stasis. Lactate and vital signs normal. Clinically no toxic megacolon or ileus, but treating aggressively as worsened on standard vancomycin dosing. Now WBC normalizing on high dose PO vanc + flagyl, but diarrhea/LLQ pain persists.    # CDI, first episode, severe  - Continue vancomycin 500mg PO q6h and flagyl 500mg IV q8h  - Agree with Dr. Isaac's recommendation for vancomycin retention enemas 500mg [place] q6h    ID Team 2 will follow

## 2025-05-16 ENCOUNTER — TRANSCRIPTION ENCOUNTER (OUTPATIENT)
Age: 75
End: 2025-05-16

## 2025-05-16 LAB
ALBUMIN SERPL ELPH-MCNC: 3.2 G/DL — LOW (ref 3.3–5)
ALP SERPL-CCNC: 78 U/L — SIGNIFICANT CHANGE UP (ref 40–120)
ALT FLD-CCNC: 12 U/L — SIGNIFICANT CHANGE UP (ref 10–45)
ANION GAP SERPL CALC-SCNC: 13 MMOL/L — SIGNIFICANT CHANGE UP (ref 5–17)
AST SERPL-CCNC: 16 U/L — SIGNIFICANT CHANGE UP (ref 10–40)
BASOPHILS # BLD AUTO: 0 K/UL — SIGNIFICANT CHANGE UP (ref 0–0.2)
BASOPHILS NFR BLD AUTO: 0 % — SIGNIFICANT CHANGE UP (ref 0–2)
BILIRUB SERPL-MCNC: 0.3 MG/DL — SIGNIFICANT CHANGE UP (ref 0.2–1.2)
BUN SERPL-MCNC: 6 MG/DL — LOW (ref 7–23)
CALCIUM SERPL-MCNC: 8.4 MG/DL — SIGNIFICANT CHANGE UP (ref 8.4–10.5)
CHLORIDE SERPL-SCNC: 105 MMOL/L — SIGNIFICANT CHANGE UP (ref 96–108)
CO2 SERPL-SCNC: 22 MMOL/L — SIGNIFICANT CHANGE UP (ref 22–31)
CREAT SERPL-MCNC: 0.64 MG/DL — SIGNIFICANT CHANGE UP (ref 0.5–1.3)
EGFR: 93 ML/MIN/1.73M2 — SIGNIFICANT CHANGE UP
EGFR: 93 ML/MIN/1.73M2 — SIGNIFICANT CHANGE UP
EOSINOPHIL # BLD AUTO: 0.17 K/UL — SIGNIFICANT CHANGE UP (ref 0–0.5)
EOSINOPHIL NFR BLD AUTO: 1.8 % — SIGNIFICANT CHANGE UP (ref 0–6)
GLUCOSE SERPL-MCNC: 105 MG/DL — HIGH (ref 70–99)
HCT VFR BLD CALC: 32.8 % — LOW (ref 34.5–45)
HGB BLD-MCNC: 10.9 G/DL — LOW (ref 11.5–15.5)
LYMPHOCYTES # BLD AUTO: 1.52 K/UL — SIGNIFICANT CHANGE UP (ref 1–3.3)
LYMPHOCYTES # BLD AUTO: 15.9 % — SIGNIFICANT CHANGE UP (ref 13–44)
MAGNESIUM SERPL-MCNC: 2 MG/DL — SIGNIFICANT CHANGE UP (ref 1.6–2.6)
MCHC RBC-ENTMCNC: 29.9 PG — SIGNIFICANT CHANGE UP (ref 27–34)
MCHC RBC-ENTMCNC: 33.2 G/DL — SIGNIFICANT CHANGE UP (ref 32–36)
MCV RBC AUTO: 89.9 FL — SIGNIFICANT CHANGE UP (ref 80–100)
MONOCYTES # BLD AUTO: 0.84 K/UL — SIGNIFICANT CHANGE UP (ref 0–0.9)
MONOCYTES NFR BLD AUTO: 8.8 % — SIGNIFICANT CHANGE UP (ref 2–14)
NEUTROPHILS # BLD AUTO: 6.93 K/UL — SIGNIFICANT CHANGE UP (ref 1.8–7.4)
NEUTROPHILS NFR BLD AUTO: 70.8 % — SIGNIFICANT CHANGE UP (ref 43–77)
PHOSPHATE SERPL-MCNC: 2.9 MG/DL — SIGNIFICANT CHANGE UP (ref 2.5–4.5)
PLATELET # BLD AUTO: 204 K/UL — SIGNIFICANT CHANGE UP (ref 150–400)
POTASSIUM SERPL-MCNC: 3.6 MMOL/L — SIGNIFICANT CHANGE UP (ref 3.5–5.3)
POTASSIUM SERPL-SCNC: 3.6 MMOL/L — SIGNIFICANT CHANGE UP (ref 3.5–5.3)
PROT SERPL-MCNC: 4.9 G/DL — LOW (ref 6–8.3)
RBC # BLD: 3.65 M/UL — LOW (ref 3.8–5.2)
RBC # FLD: 14.1 % — SIGNIFICANT CHANGE UP (ref 10.3–14.5)
SODIUM SERPL-SCNC: 140 MMOL/L — SIGNIFICANT CHANGE UP (ref 135–145)
WBC # BLD: 9.55 K/UL — SIGNIFICANT CHANGE UP (ref 3.8–10.5)
WBC # FLD AUTO: 9.55 K/UL — SIGNIFICANT CHANGE UP (ref 3.8–10.5)

## 2025-05-16 PROCEDURE — 99233 SBSQ HOSP IP/OBS HIGH 50: CPT | Mod: GC

## 2025-05-16 PROCEDURE — G0545: CPT

## 2025-05-16 PROCEDURE — 99232 SBSQ HOSP IP/OBS MODERATE 35: CPT

## 2025-05-16 RX ORDER — ESTRADIOL 0.05MG/24H
0.5 PATCH, TRANSDERMAL SEMIWEEKLY TRANSDERMAL DAILY
Refills: 0 | Status: DISCONTINUED | OUTPATIENT
Start: 2025-05-16 | End: 2025-05-20

## 2025-05-16 RX ORDER — PROGESTERONE 200 MG/1
100 CAPSULE ORAL DAILY
Refills: 0 | Status: DISCONTINUED | OUTPATIENT
Start: 2025-05-16 | End: 2025-05-20

## 2025-05-16 RX ADMIN — Medication 500 MILLIGRAM(S): at 07:00

## 2025-05-16 RX ADMIN — Medication 20 MILLIGRAM(S): at 09:46

## 2025-05-16 RX ADMIN — Medication 500 MILLIGRAM(S): at 19:26

## 2025-05-16 RX ADMIN — Medication 500 MILLIGRAM(S): at 22:18

## 2025-05-16 RX ADMIN — Medication 100 MILLIGRAM(S): at 22:16

## 2025-05-16 RX ADMIN — Medication 0.5 MILLIGRAM(S): at 22:17

## 2025-05-16 RX ADMIN — Medication 500 MILLIGRAM(S): at 16:02

## 2025-05-16 RX ADMIN — Medication 500 MILLIGRAM(S): at 10:36

## 2025-05-16 RX ADMIN — Medication 1 APPLICATION(S): at 13:21

## 2025-05-16 RX ADMIN — Medication 500 MILLIGRAM(S): at 04:29

## 2025-05-16 RX ADMIN — Medication 100 MILLIGRAM(S): at 07:00

## 2025-05-16 RX ADMIN — Medication 500 MILLIGRAM(S): at 13:22

## 2025-05-16 RX ADMIN — Medication 100 MILLIGRAM(S): at 14:22

## 2025-05-16 NOTE — PROGRESS NOTE ADULT - SUBJECTIVE AND OBJECTIVE BOX
SUBJECTIVE: Patient seen and examined bedside. Pt reports feeling well however reporting increasing number of episodes of diarrhea this afternoon. Abdominal pain well controlled and improving. Denies nausea or vomiting.     enoxaparin Injectable 30 milliGRAM(s) SubCutaneous every 24 hours  metroNIDAZOLE  IVPB 500 milliGRAM(s) IV Intermittent every 8 hours  vancomycin    Solution 500 milliGRAM(s) Oral every 6 hours  vancomycin  Retention Enema 500 milliGRAM(s) Rectal every 6 hours      Vital Signs Last 24 Hrs  T(C): 36.8 (16 May 2025 17:06), Max: 37.2 (16 May 2025 12:58)  T(F): 98.2 (16 May 2025 17:06), Max: 99 (16 May 2025 12:58)  HR: 81 (16 May 2025 17:06) (79 - 85)  BP: 122/55 (16 May 2025 17:06) (116/60 - 137/72)  BP(mean): --  RR: 18 (16 May 2025 17:06) (17 - 19)  SpO2: 98% (16 May 2025 17:06) (96% - 98%)    Parameters below as of 16 May 2025 17:06  Patient On (Oxygen Delivery Method): room air      I&O's Detail    15 May 2025 07:01  -  16 May 2025 07:00  --------------------------------------------------------  IN:    IV PiggyBack: 100 mL    IV PiggyBack: 400 mL    IV PiggyBack: 249.9 mL    Lactated Ringers: 1400 mL    Oral Fluid: 720 mL  Total IN: 2869.9 mL    OUT:  Total OUT: 0 mL    Total NET: 2869.9 mL      16 May 2025 07:01  -  16 May 2025 18:03  --------------------------------------------------------  IN:    Oral Fluid: 380 mL  Total IN: 380 mL    OUT:    Voided (mL): 200 mL  Total OUT: 200 mL    Total NET: 180 mL          General: NAD, resting comfortably in bed  C/V: NSR  Pulm: Nonlabored breathing, no respiratory distress  Abd: softly distended, focal TTP in LLQ improving, no rebound, no guarding  Extrem: WWP, no edema, SCDs in place        LABS:                        10.9   9.55  )-----------( 204      ( 16 May 2025 07:15 )             32.8     05-16    140  |  105  |  6[L]  ----------------------------<  105[H]  3.6   |  22  |  0.64    Ca    8.4      16 May 2025 07:15  Phos  2.9     05-16  Mg     2.0     05-16    TPro  4.9[L]  /  Alb  3.2[L]  /  TBili  0.3  /  DBili  x   /  AST  16  /  ALT  12  /  AlkPhos  78  05-16      Urinalysis Basic - ( 16 May 2025 07:15 )    Color: x / Appearance: x / SG: x / pH: x  Gluc: 105 mg/dL / Ketone: x  / Bili: x / Urobili: x   Blood: x / Protein: x / Nitrite: x   Leuk Esterase: x / RBC: x / WBC x   Sq Epi: x / Non Sq Epi: x / Bacteria: x        RADIOLOGY & ADDITIONAL STUDIES:

## 2025-05-16 NOTE — PROGRESS NOTE ADULT - ASSESSMENT
75F w/ hx diverticulitis, prior episode of ischemic colitis in 2016, GERD on PPI, and recent admission 4/6-11/25 for colitis, possibly ischemic, managed conservatively with IVF, bowel rest, and 5 days of ceftriaxone/flagyl. Then admitted 5/9-12/2025 with worsening diarrhea (was having 30 BM/day, loose, with cramping and chills), given a dose of cipro/flagyl but ultimately found to have CDI colitis (severe with WBC 19k), CTAP w/ PO contrast normal, treated with PO vanc and diarrhea/cramping/chills all resolved, WBC normalized and patient discharged home. Then came back to the following day (5/13) with worsening diarrhea and LLQ. WBC up to 17.5k -> 20k, repeat CTAP w/ PO contrast with new wall thickening of descending/rectosigmoid colon and marked stool throughout the large bowel concerning for fecal stasis. Lactate and vital signs normal. Clinically no toxic megacolon or ileus, but treating aggressively as worsened on standard vancomycin dosing. Now WBC normalized and abdominal symptoms improving on high dose PO vanc + DE vanc + IV flagyl.    # CDI, first episode, severe  - Continue vancomycin 500mg PO q6h and flagyl 500mg IV q8h  - Defer to surgical team re: duration of vancomycin retention enemas - from my perspective could consider stopping tomorrow if pt continues to improve.    ID Team 2 will follow

## 2025-05-16 NOTE — DISCHARGE NOTE PROVIDER - NSDCCPCAREPLAN_GEN_ALL_CORE_FT
PRINCIPAL DISCHARGE DIAGNOSIS  Diagnosis: C. difficile colitis  Assessment and Plan of Treatment: You were admitted to the hospital for treatment of an infection known as C. Difficile colitis. This is an organism which is not uncommonly a normal resident of the microbiome within our intestines. However, occasionally we can have strains of C. Difficile that produce a toxin that can cause infection in the bowels. The treatment for this is oral vancomycin, however the dosage we gave you inside the hospital was much higher than the usual dose we would use for the first episode of C. Difficile colitis treatment. Please continue to treat this as recommended by our infectious disease team by taking oral vancomycin at these dosages. If you feel that suddenly your symptoms worsen significantly, it may represent recurrence and would warrant being seen again in the emergency department.     PRINCIPAL DISCHARGE DIAGNOSIS  Diagnosis: C. difficile colitis  Assessment and Plan of Treatment: You were admitted to the hospital for treatment of an infection known as C. Difficile colitis. This is an organism which is not uncommonly a normal resident of the microbiome within our intestines. However, occasionally we can have strains of C. Difficile that produce a toxin that can cause infection in the bowels. The treatment for this is oral vancomycin, however the dosage we gave you inside the hospital was much higher than the usual dose we would use for the first episode of C. Difficile colitis treatment. Please continue to treat this as recommended by our infectious disease team by taking oral vancomycin every 6 hours. If you feel that suddenly your symptoms worsen significantly, it may represent recurrence and would warrant being seen again in the emergency department.

## 2025-05-16 NOTE — PROGRESS NOTE ADULT - PROBLEM SELECTOR PLAN 1
RESOLVED  Present on admission, HR > 90, WBC > 12, SBP drop > 40 2/2 sepsis    -Infectious source - C- diff  - c/w PO vanc at 250mg q6h as below

## 2025-05-16 NOTE — DISCHARGE NOTE PROVIDER - HOSPITAL COURSE
#Discharge: do not delete    Hospital course (by problem):    #Severe C. Difficile colitis  - First recurrence of infection with elevated WBC and > 15BM per day initially  - Treated with oral vancomycin 500mg q6h, Metronidazole 500mg q8h, and Vancomycin enemas q6h while inpatient  - Improved abdominal exam and WBC as well as decreased BMs  - Will be continued on vancomycin 500mg q6h    Patient was discharged to: Home    New medications: Oral vancomycin  Changes to old medications: None  Medications that were stopped: None    Items to follow up as outpatient:    Physical exam at the time of discharge:  Constitutional:  Eyes:  Neck:  Respiratory:  Cardiac:  Gastrointestinal:  Vascular:  Skin:  Capillary refill:         #Discharge: do not delete  74 y/o F w/ PMHx of diverticulitis, ischemic colitis, recent admit 5/9-13/25 for c. dif being treated w/ PO vancomycin, came in for worsening diarrhea. Found to have uptrending WBC count. CT A/P with large stool burden and colitis. Initially on vancomycin 500mg PO q6h, flagyl 500mg IV q8h and vancomycin retention enemas 500mg [place] q6h. Now on PO vanc with clinical improvement. Medically stable for discharge.     Hospital course (by problem):  #Severe sepsis 2/2 C. Diff colitis    RESOLVED  Present on admission, HR > 90, WBC > 12, SBP drop > 40   - c/w PO vanc 500mg q6h (EOT: 5/27)    #Severe C. Difficile colitis  - First recurrence of infection with elevated WBC and > 15BM per day initially  - Treated with oral vancomycin 500mg q6h, Metronidazole 500mg q8h, and Vancomycin enemas q6h while inpatient  - Improved abdominal exam and WBC as well as decreased BMs  - Will be continued on vancomycin 500mg q6h (EOT: 5/27)    Patient was discharged to: Home    New medications: Oral vancomycin  Changes to old medications: None  Medications that were stopped: None    Physical exam at the time of discharge:  GENERAL: No acute distress  HEAD:  Atraumatic, Normocephalic  EYES: Normal extraocular movements. Conjunctiva and sclera are normal  NECK: Supple. Normal thyroid. No lymphadenopathy  NERVOUS SYSTEM:  Alert & Oriented X3. Motor Strength 5/5 B/L upper and lower extremities; DTRs 2+ intact and symmetric.  CHEST/LUNG: No wheezing or crackles present.  CARDIAC: Regular rate and rhythm. No murmurs or rubs. Jugular venous pressure is normal.  ABDOMEN: Nontender. Nondistended. Soft abdomen.  EXTREMITIES:  2+ Peripheral Pulses, No clubbing, cyanosis, or edema.  SKIN: No rashes or lesions

## 2025-05-16 NOTE — PROGRESS NOTE ADULT - SUBJECTIVE AND OBJECTIVE BOX
CHULA CRAWFORD  75y  Female      Patient is a 75y old  Female who presents with a chief complaint of C Diff diarrhea (16 May 2025 12:51)      Notable Events: Resting comfortably this morning, had a period last night where she got a few hours of sleep without having a bowel movement. Improving abdominal exam. Will plan for potential discharge tomorrow.      REVIEW OF SYSTEMS:  CONSTITUTIONAL: No fever  EYES: No visual disturbances  ENMT: No hearing changes, No sore throat  RESPIRATORY: No cough, No shortness of breath  CARDIOVASCULAR: No chest pain, No palpitations  GASTROINTESTINAL: No abdominal pain, No nausea, No vomiting, No diarrhea, No melena, No hematochezia.  GENITOURINARY: No dysuria, frequency, hematuria, or incontinence  NEUROLOGICAL: No headaches, No weakness, No numbness, No tremors  SKIN: No lesions, No rashes  MUSCULOSKELETAL: No joint pain, No backpain, No extremity pain  PSYCHIATRIC: No depression, anxiety, or difficulty sleeping  FAMILY HISTORY:    Vital Signs Last 24 Hrs  T(C): 36.8 (16 May 2025 17:06), Max: 37.2 (16 May 2025 12:58)  T(F): 98.2 (16 May 2025 17:06), Max: 99 (16 May 2025 12:58)  HR: 81 (16 May 2025 17:06) (79 - 85)  BP: 122/55 (16 May 2025 17:06) (116/60 - 137/72)  BP(mean): --  RR: 18 (16 May 2025 17:06) (17 - 19)  SpO2: 98% (16 May 2025 17:06) (96% - 98%)    Parameters below as of 16 May 2025 17:06  Patient On (Oxygen Delivery Method): room air      sulfa drugs (Swelling)  penicillins (Flushing)  [place] (Vomiting)  IV Contrast (Other)      PHYSICAL EXAM:  GENERAL: No acute distress  HEAD:  Atraumatic, Normocephalic  EYES: Normal extraocular movements. Conjunctiva and sclera are normal  NECK: Supple. Normal thyroid. No lymphadenopathy  NERVOUS SYSTEM:  Alert & Oriented X3. Motor Strength 5/5 B/L upper and lower extremities; DTRs 2+ intact and symmetric.  CHEST/LUNG: No wheezing or crackles present.  CARDIAC: Regular rate and rhythm. No murmurs or rubs. Jugular venous pressure is normal.  ABDOMEN: Nontender. Nondistended. Soft abdomen.  EXTREMITIES:  2+ Peripheral Pulses, No clubbing, cyanosis, or edema.  SKIN: No rashes or lesions    Consultant(s) Notes Reviewed:  [x ] YES  [ ] NO  Care Discussed with Consultants/Other Providers [ x] YES  [ ] NO    LABS:                        10.9   9.55  )-----------( 204      ( 16 May 2025 07:15 )             32.8     05-16    140  |  105  |  6[L]  ----------------------------<  105[H]  3.6   |  22  |  0.64    Ca    8.4      16 May 2025 07:15  Phos  2.9     05-16  Mg     2.0     05-16    TPro  4.9[L]  /  Alb  3.2[L]  /  TBili  0.3  /  DBili  x   /  AST  16  /  ALT  12  /  AlkPhos  78  05-16          Urinalysis Basic - ( 16 May 2025 07:15 )    Color: x / Appearance: x / SG: x / pH: x  Gluc: 105 mg/dL / Ketone: x  / Bili: x / Urobili: x   Blood: x / Protein: x / Nitrite: x   Leuk Esterase: x / RBC: x / WBC x   Sq Epi: x / Non Sq Epi: x / Bacteria: x          RADIOLOGY & ADDITIONAL TESTS:    Imaging Personally Reviewed:  [x] YES  [ ] NO  acetaminophen     Tablet .. 650 milliGRAM(s) Oral every 6 hours PRN  chlorhexidine 2% Cloths 1 Application(s) Topical daily  enoxaparin Injectable 30 milliGRAM(s) SubCutaneous every 24 hours  hyoscyamine SL 0.125 milliGRAM(s) SubLingual two times a day PRN  lactated ringers. 1000 milliLiter(s) IV Continuous <Continuous>  melatonin 3 milliGRAM(s) Oral at bedtime PRN  metroNIDAZOLE  IVPB 500 milliGRAM(s) IV Intermittent every 8 hours  ondansetron Injectable 4 milliGRAM(s) IV Push every 8 hours PRN  pantoprazole    Tablet 20 milliGRAM(s) Oral before breakfast  vancomycin    Solution 500 milliGRAM(s) Oral every 6 hours  vancomycin  Retention Enema 500 milliGRAM(s) Rectal every 6 hours      HEALTH ISSUES - PROBLEM Dx:  Clostridium difficile colitis    Prophylactic measure    Sepsis    Post menopausal syndrome

## 2025-05-16 NOTE — DISCHARGE NOTE PROVIDER - NSDCMRMEDTOKEN_GEN_ALL_CORE_FT
Estrace 0.5 mg oral tablet: 1 tab(s) orally once a day  progesterone 100 mg oral capsule: 1 cap(s) orally every 3 days  vancomycin 125 mg oral capsule: 1 cap(s) orally every 6 hours   Estrace 0.5 mg oral tablet: 1 tab(s) orally once a day  progesterone 100 mg oral capsule: 1 cap(s) orally every 3 days  vancomycin 250 mg oral capsule: 2 cap(s) orally every 6 hours End: 5/27

## 2025-05-16 NOTE — PROGRESS NOTE ADULT - PROBLEM SELECTOR PLAN 2
Pt presenting with severe nonbloody diarrea, found to be (+) for c. diff on stool testing. Meets criteria for severe c. diff (WBC >15k). CTAP showing no acute abnormality in the abdomen and pelvis including megacolon, pt hemodynamically stable, no evidence of fulminant c. diff. Was admitted mid-april and was on ctx/flagyl during episode of ischemic colitis.    - hold home protonix  - C/w vancomycin  mg q6 x 10 days  - Metronidazole 500mg q8h IV  - encourage PO intake  - tylenol and zofran PRN for pain/nausea  - pt will reach out to GI to move up her colonoscopy which was initially scheduled for June   - Will start vancomycin retention enemas q6h

## 2025-05-16 NOTE — PROGRESS NOTE ADULT - SUBJECTIVE AND OBJECTIVE BOX
Overall feels about the same, BMs less frequent.  Receiving vanco enemas, not holding them for long.  AFVSS  Abd soft, less distended, mild TTP LLQ    WBC 13k yesterday    Imp: C diff colitis  Rec:  Continue po & OK vanco, IV metronidazole  Encouraged food  Ambulate    Dr. Parekh covering until 5/19

## 2025-05-16 NOTE — PROGRESS NOTE ADULT - ATTENDING COMMENTS
75F w/ PMH diverticulitis, ischemic colitis, recent admission 5/9-5/13 for C.diff colitis which initially improved with PO vancomycin but shortly after discharge had worsening diarrhea and re-presented with uptrending leukocytosis concerning for c.diff infection now improving s/p PO vancomycin, IV metronidazole and vancomycin enemas.     Per patient has had decrease in frequency of diarrhea – now spaced a few hours apart. Was reportedly told by ID and surgery this AM that may want to monitor patient longer despite improvement to ensure no recurrence given recent readmission.     Plan:  #Sepsis present on admission  #Recurrent C.diff Colitis  -ID following, appreciate reccs regarding recommended treatment and duration for C.diff. If recommending ongoing observation, appreciate guidance on what criteria to evaluate that patient is responding to treatment.   -Leukocytosis improving, BM improving. Continue to monitor.     Rest of plan as per resident note. 75F w/ PMH diverticulitis, ischemic colitis, recent admission 5/9-5/13 for C.diff colitis which initially improved with PO vancomycin but shortly after discharge had worsening diarrhea and re-presented with uptrending leukocytosis concerning for c.diff infection now improving s/p PO vancomycin, IV metronidazole and vancomycin enemas.     Per patient has had decrease in frequency of diarrhea – now spaced a few hours apart. Was reportedly told by ID and surgery this AM that may want to monitor patient longer despite improvement to ensure no recurrence given recent readmission.     Plan:  #Sepsis present on admission  #Recurrent C.diff Colitis  -ID following, appreciate reccs regarding recommended treatment and duration for C.diff. If recommending ongoing observation, appreciate guidance on what criteria to evaluate that patient is responding to treatment.   -Leukocytosis improving, BM improving. Continue to monitor.     Rest of plan as per resident note

## 2025-05-16 NOTE — DISCHARGE NOTE PROVIDER - CARE PROVIDER_API CALL
RANDY JAMES  [address] [facility] [place] [address]  Phone: [phone]  Fax: [phone]  Follow Up Time: 1 week

## 2025-05-16 NOTE — PROGRESS NOTE ADULT - SUBJECTIVE AND OBJECTIVE BOX
INFECTIOUS DISEASES CONSULT FOLLOW-UP NOTE    INTERVAL HPI/OVERNIGHT EVENTS:  Afebrile, WBC normalized, diarrhea and LLQ pain improving (6 episodes today so far, was 12 by this time yesterday)    ROS:   Constitutional, eyes, ENT, cardiovascular, respiratory, gastrointestinal, genitourinary, integumentary, neurological, psychiatric and heme/lymph are otherwise negative other than noted above       ANTIBIOTICS/RELEVANT:    MEDICATIONS  (STANDING):  chlorhexidine 2% Cloths 1 Application(s) Topical daily  enoxaparin Injectable 30 milliGRAM(s) SubCutaneous every 24 hours  lactated ringers. 1000 milliLiter(s) (100 mL/Hr) IV Continuous <Continuous>  metroNIDAZOLE  IVPB 500 milliGRAM(s) IV Intermittent every 8 hours  pantoprazole    Tablet 20 milliGRAM(s) Oral before breakfast  vancomycin    Solution 500 milliGRAM(s) Oral every 6 hours  vancomycin  Retention Enema 500 milliGRAM(s) Rectal every 6 hours    MEDICATIONS  (PRN):  acetaminophen     Tablet .. 650 milliGRAM(s) Oral every 6 hours PRN Temp greater or equal to 38C (100.4F), Mild Pain (1 - 3)  hyoscyamine SL 0.125 milliGRAM(s) SubLingual two times a day PRN Severe spasms  melatonin 3 milliGRAM(s) Oral at bedtime PRN Insomnia  ondansetron Injectable 4 milliGRAM(s) IV Push every 8 hours PRN Nausea and/or Vomiting        Vital Signs Last 24 Hrs  T(C): 36.5 (16 May 2025 08:40), Max: 36.7 (15 May 2025 14:37)  T(F): 97.7 (16 May 2025 08:40), Max: 98.1 (15 May 2025 16:55)  HR: 79 (16 May 2025 08:40) (79 - 87)  BP: 126/54 (16 May 2025 08:40) (114/63 - 126/54)  BP(mean): --  RR: 18 (16 May 2025 08:40) (17 - 18)  SpO2: 98% (16 May 2025 08:40) (96% - 99%)    Parameters below as of 16 May 2025 08:40  Patient On (Oxygen Delivery Method): room air        05-15-25 @ 07:01  -  05-16-25 @ 07:00  --------------------------------------------------------  IN: 2869.9 mL / OUT: 0 mL / NET: 2869.9 mL      PHYSICAL EXAM:  Constitutional: alert, NAD, non-toxic  Pulmonary: no respiratory distress  Heart: heart rate was normal and rhythm regular  Abdomen: soft, moderate tenderness in LLQ. No peritoneal signs  Neurological: no focal deficits.   Psychiatric: the affect was normal  Skin: no rash          LABS:                        10.9   9.55  )-----------( 204      ( 16 May 2025 07:15 )             32.8     05-16    140  |  105  |  6[L]  ----------------------------<  105[H]  3.6   |  22  |  0.64    Ca    8.4      16 May 2025 07:15  Phos  2.9     05-16  Mg     2.0     05-16    TPro  4.9[L]  /  Alb  3.2[L]  /  TBili  0.3  /  DBili  x   /  AST  16  /  ALT  12  /  AlkPhos  78  05-16      Urinalysis Basic - ( 16 May 2025 07:15 )    Color: x / Appearance: x / SG: x / pH: x  Gluc: 105 mg/dL / Ketone: x  / Bili: x / Urobili: x   Blood: x / Protein: x / Nitrite: x   Leuk Esterase: x / RBC: x / WBC x   Sq Epi: x / Non Sq Epi: x / Bacteria: x        MICROBIOLOGY:  Reviewed    RADIOLOGY & ADDITIONAL STUDIES:  Reviewed

## 2025-05-16 NOTE — PROGRESS NOTE ADULT - ASSESSMENT
75F w/ recent admission for sepsis 2/2 c. diff colitis, now re-admitted to medicine service for continued episodes of diarrhea and presyncopal sensation. Surgery consulted for concern of fulminant colitis, toxic megacolon. Overall low clinical suspicion for fulminant colitis/toxic megacolon given normal vitals, unremarkable abdominal exam. CT scan findings of thickening of descending & rectosigmoid colon likely due to continued c diff infection. Pt with improved abdominal exam and WBC normalized today, still having persistent watery diarrhea.    Recommendation:    - No acute surgical intervention is recommended at this time  - Continue with PO/CO Vancomycin and IV flagyl, CO vanc to discontinue tomorrow 5/17 - may be contributing to increased fecal urgency  - Okay to include probiotic (activia/kefir) into diet  - Antispasmodics not recommended, careful use in C. Diff patients  - Diet as tolerated  - Ambulation      Plan discussed with Dr. Isaac  General surgery team 4c will continue to follow

## 2025-05-16 NOTE — PROGRESS NOTE ADULT - ASSESSMENT
76yo  w/ PMH diverticulitis, ischemic colitis presenting with 3d hx of worsening diarrhea admitted for severe C diff colitis.

## 2025-05-16 NOTE — DISCHARGE NOTE PROVIDER - ATTENDING DISCHARGE PHYSICAL EXAMINATION:
75 YOF with PMH of diverticulitis, ischemic colitis, recent admission for CDI discharged on PO vancomycin admitted for sepsis in setting of worsening CDI treated aggressively with PO/GA vancomycin and IV metronidazole. Demonstrated clinical improvement, de-escalated to vancomycin 500mg PO q7hr with continued improvement, pain for discharge home to complete 14-day course (OET 5/27).

## 2025-05-17 LAB
ALBUMIN SERPL ELPH-MCNC: 2.8 G/DL — LOW (ref 3.3–5)
ALP SERPL-CCNC: 57 U/L — SIGNIFICANT CHANGE UP (ref 40–120)
ALT FLD-CCNC: 13 U/L — SIGNIFICANT CHANGE UP (ref 10–45)
ANION GAP SERPL CALC-SCNC: 10 MMOL/L — SIGNIFICANT CHANGE UP (ref 5–17)
AST SERPL-CCNC: 16 U/L — SIGNIFICANT CHANGE UP (ref 10–40)
BASOPHILS # BLD AUTO: 0.06 K/UL — SIGNIFICANT CHANGE UP (ref 0–0.2)
BASOPHILS NFR BLD AUTO: 0.9 % — SIGNIFICANT CHANGE UP (ref 0–2)
BILIRUB SERPL-MCNC: 0.2 MG/DL — SIGNIFICANT CHANGE UP (ref 0.2–1.2)
BUN SERPL-MCNC: 6 MG/DL — LOW (ref 7–23)
CALCIUM SERPL-MCNC: 8 MG/DL — LOW (ref 8.4–10.5)
CHLORIDE SERPL-SCNC: 106 MMOL/L — SIGNIFICANT CHANGE UP (ref 96–108)
CO2 SERPL-SCNC: 24 MMOL/L — SIGNIFICANT CHANGE UP (ref 22–31)
CREAT SERPL-MCNC: 0.65 MG/DL — SIGNIFICANT CHANGE UP (ref 0.5–1.3)
EGFR: 92 ML/MIN/1.73M2 — SIGNIFICANT CHANGE UP
EGFR: 92 ML/MIN/1.73M2 — SIGNIFICANT CHANGE UP
EOSINOPHIL # BLD AUTO: 0.12 K/UL — SIGNIFICANT CHANGE UP (ref 0–0.5)
EOSINOPHIL NFR BLD AUTO: 1.7 % — SIGNIFICANT CHANGE UP (ref 0–6)
GLUCOSE SERPL-MCNC: 99 MG/DL — SIGNIFICANT CHANGE UP (ref 70–99)
HCT VFR BLD CALC: 33.2 % — LOW (ref 34.5–45)
HGB BLD-MCNC: 10.9 G/DL — LOW (ref 11.5–15.5)
LYMPHOCYTES # BLD AUTO: 1.82 K/UL — SIGNIFICANT CHANGE UP (ref 1–3.3)
LYMPHOCYTES # BLD AUTO: 25.4 % — SIGNIFICANT CHANGE UP (ref 13–44)
MAGNESIUM SERPL-MCNC: 2 MG/DL — SIGNIFICANT CHANGE UP (ref 1.6–2.6)
MCHC RBC-ENTMCNC: 29.5 PG — SIGNIFICANT CHANGE UP (ref 27–34)
MCHC RBC-ENTMCNC: 32.8 G/DL — SIGNIFICANT CHANGE UP (ref 32–36)
MCV RBC AUTO: 89.7 FL — SIGNIFICANT CHANGE UP (ref 80–100)
MONOCYTES # BLD AUTO: 0.38 K/UL — SIGNIFICANT CHANGE UP (ref 0–0.9)
MONOCYTES NFR BLD AUTO: 5.3 % — SIGNIFICANT CHANGE UP (ref 2–14)
NEUTROPHILS # BLD AUTO: 4.66 K/UL — SIGNIFICANT CHANGE UP (ref 1.8–7.4)
NEUTROPHILS NFR BLD AUTO: 64.9 % — SIGNIFICANT CHANGE UP (ref 43–77)
PHOSPHATE SERPL-MCNC: 2.9 MG/DL — SIGNIFICANT CHANGE UP (ref 2.5–4.5)
PLATELET # BLD AUTO: 219 K/UL — SIGNIFICANT CHANGE UP (ref 150–400)
POTASSIUM SERPL-MCNC: 3.2 MMOL/L — LOW (ref 3.5–5.3)
POTASSIUM SERPL-SCNC: 3.2 MMOL/L — LOW (ref 3.5–5.3)
PROT SERPL-MCNC: 4.9 G/DL — LOW (ref 6–8.3)
RBC # BLD: 3.7 M/UL — LOW (ref 3.8–5.2)
RBC # FLD: 14.3 % — SIGNIFICANT CHANGE UP (ref 10.3–14.5)
SODIUM SERPL-SCNC: 140 MMOL/L — SIGNIFICANT CHANGE UP (ref 135–145)
WBC # BLD: 7.18 K/UL — SIGNIFICANT CHANGE UP (ref 3.8–10.5)
WBC # FLD AUTO: 7.18 K/UL — SIGNIFICANT CHANGE UP (ref 3.8–10.5)

## 2025-05-17 PROCEDURE — 99233 SBSQ HOSP IP/OBS HIGH 50: CPT

## 2025-05-17 PROCEDURE — G0545: CPT

## 2025-05-17 PROCEDURE — 99232 SBSQ HOSP IP/OBS MODERATE 35: CPT

## 2025-05-17 PROCEDURE — 99231 SBSQ HOSP IP/OBS SF/LOW 25: CPT

## 2025-05-17 RX ADMIN — Medication 500 MILLIGRAM(S): at 12:23

## 2025-05-17 RX ADMIN — Medication 500 MILLIGRAM(S): at 06:47

## 2025-05-17 RX ADMIN — Medication 4 MILLIGRAM(S): at 17:51

## 2025-05-17 RX ADMIN — Medication 500 MILLIGRAM(S): at 18:00

## 2025-05-17 RX ADMIN — Medication 100 MILLIGRAM(S): at 06:46

## 2025-05-17 RX ADMIN — Medication 500 MILLIGRAM(S): at 01:06

## 2025-05-17 RX ADMIN — Medication 40 MILLIEQUIVALENT(S): at 19:27

## 2025-05-17 RX ADMIN — Medication 1 APPLICATION(S): at 12:23

## 2025-05-17 RX ADMIN — Medication 650 MILLIGRAM(S): at 01:41

## 2025-05-17 RX ADMIN — Medication 20 MILLIGRAM(S): at 06:46

## 2025-05-17 RX ADMIN — Medication 0.5 MILLIGRAM(S): at 22:04

## 2025-05-17 RX ADMIN — Medication 100 MILLIGRAM(S): at 22:05

## 2025-05-17 RX ADMIN — Medication 100 MILLIGRAM(S): at 14:05

## 2025-05-17 NOTE — PROGRESS NOTE ADULT - ASSESSMENT
75F w/ recent admission for sepsis 2/2 c. diff colitis, now re-admitted to medicine service for continued episodes of diarrhea and presyncopal sensation. Surgery consulted for concern of fulminant colitis, toxic megacolon. Overall low clinical suspicion for fulminant colitis/toxic megacolon given normal vitals, unremarkable abdominal exam. CT scan findings of thickening of descending & rectosigmoid colon likely due to continued c diff infection. Patient now s/p treatment with IV flagyl and PO/IA vancomycin with significant clinical improvement.    Cont IV Flagyl and PO Vanc  Can DC Vanc enemas  Diet can be advanced as tolerated    Rest of care per primary    Please call surgery team 4c with any questions or concerns.

## 2025-05-17 NOTE — PROGRESS NOTE ADULT - SUBJECTIVE AND OBJECTIVE BOX
SUBJECTIVE: Pt seen and examined at bedside this am by surgery team. She is feeling better. Her pain has improved and has decreased bowel movements. Denies fevers, chills, nausea or vomiting.    MEDICATIONS  (STANDING):  chlorhexidine 2% Cloths 1 Application(s) Topical daily  enoxaparin Injectable 30 milliGRAM(s) SubCutaneous every 24 hours  estradiol 0.5 milliGRAM(s) Oral daily  lactated ringers. 1000 milliLiter(s) (100 mL/Hr) IV Continuous <Continuous>  metroNIDAZOLE  IVPB 500 milliGRAM(s) IV Intermittent every 8 hours  pantoprazole    Tablet 20 milliGRAM(s) Oral before breakfast  progesterone 100 milliGRAM(s) Oral daily  vancomycin    Solution 500 milliGRAM(s) Oral every 6 hours    MEDICATIONS  (PRN):  acetaminophen     Tablet .. 650 milliGRAM(s) Oral every 6 hours PRN Temp greater or equal to 38C (100.4F), Mild Pain (1 - 3)  hyoscyamine SL 0.125 milliGRAM(s) SubLingual two times a day PRN Severe spasms  melatonin 3 milliGRAM(s) Oral at bedtime PRN Insomnia  ondansetron Injectable 4 milliGRAM(s) IV Push every 8 hours PRN Nausea and/or Vomiting      Vital Signs Last 24 Hrs  T(C): 36.9 (17 May 2025 20:25), Max: 37 (17 May 2025 01:19)  T(F): 98.5 (17 May 2025 20:25), Max: 98.6 (17 May 2025 01:19)  HR: 84 (17 May 2025 20:25) (70 - 92)  BP: 138/76 (17 May 2025 20:25) (137/73 - 155/78)  BP(mean): 96 (17 May 2025 01:19) (96 - 96)  RR: 18 (17 May 2025 20:25) (17 - 18)  SpO2: 98% (17 May 2025 20:25) (95% - 98%)    Parameters below as of 17 May 2025 20:25  Patient On (Oxygen Delivery Method): room air        Physical Exam:  Constitutional: A&Ox3, NAD  Respiratory: normal work of breathing  Cardiovascular: NSR, RRR  Abdomen: soft, non abdomen, non distended, no rebound or guarding  Legs: WWP, SCDs in place, no calf tenderness        I&O's Detail    16 May 2025 07:01  -  17 May 2025 07:00  --------------------------------------------------------  IN:    Lactated Ringers: 500 mL    Oral Fluid: 380 mL  Total IN: 880 mL    OUT:    Voided (mL): 204 mL  Total OUT: 204 mL    Total NET: 676 mL      17 May 2025 07:01  -  18 May 2025 01:11  --------------------------------------------------------  IN:    Oral Fluid: 430 mL  Total IN: 430 mL    OUT:  Total OUT: 0 mL    Total NET: 430 mL          LABS:                        10.9   7.18  )-----------( 219      ( 17 May 2025 06:48 )             33.2     05-17    140  |  106  |  6[L]  ----------------------------<  99  3.2[L]   |  24  |  0.65    Ca    8.0[L]      17 May 2025 06:48  Phos  2.9     05-17  Mg     2.0     05-17    TPro  4.9[L]  /  Alb  2.8[L]  /  TBili  0.2  /  DBili  x   /  AST  16  /  ALT  13  /  AlkPhos  57  05-17      Urinalysis Basic - ( 17 May 2025 06:48 )    Color: x / Appearance: x / SG: x / pH: x  Gluc: 99 mg/dL / Ketone: x  / Bili: x / Urobili: x   Blood: x / Protein: x / Nitrite: x   Leuk Esterase: x / RBC: x / WBC x   Sq Epi: x / Non Sq Epi: x / Bacteria: x        RADIOLOGY & ADDITIONAL STUDIES:

## 2025-05-17 NOTE — PROGRESS NOTE ADULT - SUBJECTIVE AND OBJECTIVE BOX
INFECTIOUS DISEASES CONSULT FOLLOW-UP NOTE    INTERVAL HPI/OVERNIGHT EVENTS:  Clinically improving, LLQ pain much improved, diarrhea improving (now 3 episodes today, small volume, liquid)    ROS:   Constitutional, eyes, ENT, cardiovascular, respiratory, gastrointestinal, genitourinary, integumentary, neurological, psychiatric and heme/lymph are otherwise negative other than noted above       ANTIBIOTICS/RELEVANT:    MEDICATIONS  (STANDING):  chlorhexidine 2% Cloths 1 Application(s) Topical daily  enoxaparin Injectable 30 milliGRAM(s) SubCutaneous every 24 hours  estradiol 0.5 milliGRAM(s) Oral daily  lactated ringers. 1000 milliLiter(s) (100 mL/Hr) IV Continuous <Continuous>  metroNIDAZOLE  IVPB 500 milliGRAM(s) IV Intermittent every 8 hours  pantoprazole    Tablet 20 milliGRAM(s) Oral before breakfast  progesterone 100 milliGRAM(s) Oral daily  vancomycin    Solution 500 milliGRAM(s) Oral every 6 hours    MEDICATIONS  (PRN):  acetaminophen     Tablet .. 650 milliGRAM(s) Oral every 6 hours PRN Temp greater or equal to 38C (100.4F), Mild Pain (1 - 3)  hyoscyamine SL 0.125 milliGRAM(s) SubLingual two times a day PRN Severe spasms  melatonin 3 milliGRAM(s) Oral at bedtime PRN Insomnia  ondansetron Injectable 4 milliGRAM(s) IV Push every 8 hours PRN Nausea and/or Vomiting        Vital Signs Last 24 Hrs  T(C): 36.9 (17 May 2025 14:49), Max: 37.1 (16 May 2025 20:50)  T(F): 98.4 (17 May 2025 14:49), Max: 98.7 (16 May 2025 20:50)  HR: 76 (17 May 2025 14:49) (70 - 96)  BP: 143/77 (17 May 2025 14:49) (122/55 - 155/78)  BP(mean): 96 (17 May 2025 01:19) (96 - 96)  RR: 17 (17 May 2025 14:49) (17 - 18)  SpO2: 98% (17 May 2025 14:49) (95% - 98%)    Parameters below as of 17 May 2025 14:49  Patient On (Oxygen Delivery Method): room air        05-16-25 @ 07:01 - 05-17-25 @ 07:00  --------------------------------------------------------  IN: 880 mL / OUT: 204 mL / NET: 676 mL    05-17-25 @ 07:01  - 05-17-25 @ 14:56  --------------------------------------------------------  IN: 430 mL / OUT: 0 mL / NET: 430 mL      PHYSICAL EXAM:  Constitutional: alert, NAD, non-toxic  Pulmonary: no respiratory distress  Heart: heart rate was normal and rhythm regular  Abdomen: soft, improved tenderness in LLQ, now minimal. No peritoneal signs  Neurological: no focal deficits.   Psychiatric: the affect was normal  Skin: no rash        LABS:                        10.9   7.18  )-----------( 219      ( 17 May 2025 06:48 )             33.2     05-17    140  |  106  |  6[L]  ----------------------------<  99  3.2[L]   |  24  |  0.65    Ca    8.0[L]      17 May 2025 06:48  Phos  2.9     05-17  Mg     2.0     05-17    TPro  4.9[L]  /  Alb  2.8[L]  /  TBili  0.2  /  DBili  x   /  AST  16  /  ALT  13  /  AlkPhos  57  05-17      Urinalysis Basic - ( 17 May 2025 06:48 )    Color: x / Appearance: x / SG: x / pH: x  Gluc: 99 mg/dL / Ketone: x  / Bili: x / Urobili: x   Blood: x / Protein: x / Nitrite: x   Leuk Esterase: x / RBC: x / WBC x   Sq Epi: x / Non Sq Epi: x / Bacteria: x        MICROBIOLOGY:  Reviewed    RADIOLOGY & ADDITIONAL STUDIES:  Reviewed

## 2025-05-17 NOTE — PROGRESS NOTE ADULT - SUBJECTIVE AND OBJECTIVE BOX
Pt seen and examined at bedside.  Attending coverage for Dr. Isaac.  75F with recent hx of ischemic colitis, subsequent development of cdiff colitis, initially managed inpatient and upon discharged developed clinical worsening and readmitted for optimized management.   Has clinically improved with PO/ME vancomycin and IV metronidazole.  Reports this morning overall feels better, bowel movements improving but remains loose.  Less significant lower abdominal soreness.  Limited appetite  Denies fevers or chills.  AVFSS  Abd is soft, nontender, nondistended  Lab work appropriate  Ok to dc vanc enemas.  Continue PO Vanc with IV Flagyl  ADAT.  Continue care as per primary.

## 2025-05-17 NOTE — PROGRESS NOTE ADULT - ASSESSMENT
75F w/ hx diverticulitis, prior episode of ischemic colitis in 2016, GERD on PPI, and recent admission 4/6-11/25 for colitis, possibly ischemic, managed conservatively with IVF, bowel rest, and 5 days of ceftriaxone/flagyl. Then admitted 5/9-12/2025 with worsening diarrhea (was having 30 BM/day, loose, with cramping and chills), given a dose of cipro/flagyl but ultimately found to have CDI colitis (severe with WBC 19k), CTAP w/ PO contrast normal, treated with PO vanc and diarrhea/cramping/chills all resolved, WBC normalized and patient discharged home. Then came back to the following day (5/13) with worsening diarrhea and LLQ. WBC up to 17.5k -> 20k, repeat CTAP w/ PO contrast with new wall thickening of descending/rectosigmoid colon and marked stool throughout the large bowel concerning for fecal stasis. Lactate and vital signs normal. Clinically no toxic megacolon or ileus, but treating aggressively as worsened on standard vancomycin dosing. Now WBC normalized and abdominal symptoms improving on high dose PO vanc + NH vanc + IV flagyl. NH vanc stopped today.    # CDI, first episode, severe  - Continue vancomycin 500mg PO q6h and flagyl 500mg IV q8h  - Will plan to stop flagyl tomorrow, and if patient continues to improve would plan for discharge home on Monday with vanc 500mg PO q6h to complete a 14 day course from 5/13.    ID Team 2 will follow

## 2025-05-17 NOTE — PROGRESS NOTE ADULT - SUBJECTIVE AND OBJECTIVE BOX
O/N Events: CELIA  Subjective/ROS: Feels improved. Requesting to stop enemas to get sleep overnight. Denies HA, CP, SOB, n/v, changes in bowel/urinary habits.  12pt ROS otherwise negative.    VITALS  Vital Signs Last 24 Hrs  T(C): 36.2 (17 May 2025 08:50), Max: 37.2 (16 May 2025 12:58)  T(F): 97.2 (17 May 2025 08:50), Max: 99 (16 May 2025 12:58)  HR: 72 (17 May 2025 08:50) (70 - 96)  BP: 155/78 (17 May 2025 08:50) (122/55 - 155/78)  BP(mean): 96 (17 May 2025 01:19) (96 - 96)  RR: 17 (17 May 2025 08:50) (17 - 19)  SpO2: 97% (17 May 2025 08:50) (95% - 98%)    Parameters below as of 17 May 2025 08:50  Patient On (Oxygen Delivery Method): room air        I&O's Summary    16 May 2025 07:01  -  17 May 2025 07:00  --------------------------------------------------------  IN: 880 mL / OUT: 204 mL / NET: 676 mL        CAPILLARY BLOOD GLUCOSE          PHYSICAL EXAM  GENERAL: No acute distress  HEAD:  Atraumatic, Normocephalic  EYES: Normal extraocular movements. Conjunctiva and sclera are normal  NECK: Supple. Normal thyroid. No lymphadenopathy  NERVOUS SYSTEM:  Alert & Oriented X3. Motor Strength 5/5 B/L upper and lower extremities; DTRs 2+ intact and symmetric.  CHEST/LUNG: No wheezing or crackles present.  CARDIAC: Regular rate and rhythm. No murmurs or rubs. Jugular venous pressure is normal.  ABDOMEN: Nontender. Nondistended. Soft abdomen.  EXTREMITIES:  2+ Peripheral Pulses, No clubbing, cyanosis, or edema.  SKIN: No rashes or lesions    MEDICATIONS  (STANDING):  chlorhexidine 2% Cloths 1 Application(s) Topical daily  enoxaparin Injectable 30 milliGRAM(s) SubCutaneous every 24 hours  estradiol 0.5 milliGRAM(s) Oral daily  lactated ringers. 1000 milliLiter(s) (100 mL/Hr) IV Continuous <Continuous>  metroNIDAZOLE  IVPB 500 milliGRAM(s) IV Intermittent every 8 hours  pantoprazole    Tablet 20 milliGRAM(s) Oral before breakfast  progesterone 100 milliGRAM(s) Oral daily  vancomycin    Solution 500 milliGRAM(s) Oral every 6 hours    MEDICATIONS  (PRN):  acetaminophen     Tablet .. 650 milliGRAM(s) Oral every 6 hours PRN Temp greater or equal to 38C (100.4F), Mild Pain (1 - 3)  hyoscyamine SL 0.125 milliGRAM(s) SubLingual two times a day PRN Severe spasms  melatonin 3 milliGRAM(s) Oral at bedtime PRN Insomnia  ondansetron Injectable 4 milliGRAM(s) IV Push every 8 hours PRN Nausea and/or Vomiting      sulfa drugs (Swelling)  penicillins (Flushing)  [place] (Vomiting)  IV Contrast (Other)      LABS                        10.9   7.18  )-----------( 219      ( 17 May 2025 06:48 )             33.2     05-17    140  |  106  |  6[L]  ----------------------------<  99  3.2[L]   |  24  |  0.65    Ca    8.0[L]      17 May 2025 06:48  Phos  2.9     05-17  Mg     2.0     05-17    TPro  4.9[L]  /  Alb  2.8[L]  /  TBili  0.2  /  DBili  x   /  AST  16  /  ALT  13  /  AlkPhos  57  05-17      Urinalysis Basic - ( 17 May 2025 06:48 )    Color: x / Appearance: x / SG: x / pH: x  Gluc: 99 mg/dL / Ketone: x  / Bili: x / Urobili: x   Blood: x / Protein: x / Nitrite: x   Leuk Esterase: x / RBC: x / WBC x   Sq Epi: x / Non Sq Epi: x / Bacteria: x            IMAGING/EKG/ETC: reviewed

## 2025-05-17 NOTE — PROGRESS NOTE ADULT - PROBLEM SELECTOR PLAN 2
- hold home protonix  - C/w vancomycin  mg q6 x 10 days  - Metronidazole 500mg q8h IV  - encourage PO intake  - tylenol and zofran PRN for pain/nausea    -Stop Vanc enemas today  -Vowst will need to be sent to pharmacy so they can order for patietn to . If patient continues to improve would favor siwtching to PO Flagyl tomorrow with plans for D/C Monday.

## 2025-05-18 LAB
ANION GAP SERPL CALC-SCNC: 9 MMOL/L — SIGNIFICANT CHANGE UP (ref 5–17)
BUN SERPL-MCNC: 3 MG/DL — LOW (ref 7–23)
CALCIUM SERPL-MCNC: 8.2 MG/DL — LOW (ref 8.4–10.5)
CHLORIDE SERPL-SCNC: 108 MMOL/L — SIGNIFICANT CHANGE UP (ref 96–108)
CO2 SERPL-SCNC: 25 MMOL/L — SIGNIFICANT CHANGE UP (ref 22–31)
CREAT SERPL-MCNC: 0.71 MG/DL — SIGNIFICANT CHANGE UP (ref 0.5–1.3)
EGFR: 89 ML/MIN/1.73M2 — SIGNIFICANT CHANGE UP
EGFR: 89 ML/MIN/1.73M2 — SIGNIFICANT CHANGE UP
GLUCOSE SERPL-MCNC: 97 MG/DL — SIGNIFICANT CHANGE UP (ref 70–99)
HCT VFR BLD CALC: 33.9 % — LOW (ref 34.5–45)
HGB BLD-MCNC: 11.1 G/DL — LOW (ref 11.5–15.5)
MAGNESIUM SERPL-MCNC: 1.9 MG/DL — SIGNIFICANT CHANGE UP (ref 1.6–2.6)
MCHC RBC-ENTMCNC: 29.4 PG — SIGNIFICANT CHANGE UP (ref 27–34)
MCHC RBC-ENTMCNC: 32.7 G/DL — SIGNIFICANT CHANGE UP (ref 32–36)
MCV RBC AUTO: 89.9 FL — SIGNIFICANT CHANGE UP (ref 80–100)
NRBC BLD AUTO-RTO: 0 /100 WBCS — SIGNIFICANT CHANGE UP (ref 0–0)
PHOSPHATE SERPL-MCNC: 2.7 MG/DL — SIGNIFICANT CHANGE UP (ref 2.5–4.5)
PLATELET # BLD AUTO: 230 K/UL — SIGNIFICANT CHANGE UP (ref 150–400)
POTASSIUM SERPL-MCNC: 3.7 MMOL/L — SIGNIFICANT CHANGE UP (ref 3.5–5.3)
POTASSIUM SERPL-SCNC: 3.7 MMOL/L — SIGNIFICANT CHANGE UP (ref 3.5–5.3)
RBC # BLD: 3.77 M/UL — LOW (ref 3.8–5.2)
RBC # FLD: 14.2 % — SIGNIFICANT CHANGE UP (ref 10.3–14.5)
SODIUM SERPL-SCNC: 142 MMOL/L — SIGNIFICANT CHANGE UP (ref 135–145)
WBC # BLD: 9.02 K/UL — SIGNIFICANT CHANGE UP (ref 3.8–10.5)
WBC # FLD AUTO: 9.02 K/UL — SIGNIFICANT CHANGE UP (ref 3.8–10.5)

## 2025-05-18 PROCEDURE — 99233 SBSQ HOSP IP/OBS HIGH 50: CPT | Mod: GC

## 2025-05-18 RX ADMIN — Medication 500 MILLIGRAM(S): at 12:15

## 2025-05-18 RX ADMIN — Medication 1 APPLICATION(S): at 12:15

## 2025-05-18 RX ADMIN — Medication 500 MILLIGRAM(S): at 18:14

## 2025-05-18 RX ADMIN — Medication 500 MILLIGRAM(S): at 00:08

## 2025-05-18 RX ADMIN — Medication 20 MILLIGRAM(S): at 06:05

## 2025-05-18 RX ADMIN — Medication 0.5 MILLIGRAM(S): at 22:05

## 2025-05-18 RX ADMIN — PROGESTERONE 100 MILLIGRAM(S): 200 CAPSULE ORAL at 22:04

## 2025-05-18 RX ADMIN — Medication 500 MILLIGRAM(S): at 06:05

## 2025-05-18 RX ADMIN — Medication 100 MILLIGRAM(S): at 06:05

## 2025-05-18 NOTE — PROGRESS NOTE ADULT - ATTENDING COMMENTS
75yF w/ pmhx diverticulitis, ischemic colitis, recent admit 5/9-13/25 for c. dif being treated w/ PO vancomycin, comes in for worsening diarrhea. Found to have uptrending WBC count. CT A/P with large stool burden and colitis. Admitted for further management.     Labs and imaging reviewed    Problem List  #Severe C. dif   #Sepsis POA  #Ischemic Colitis    Plan  -Continue PO Vanc  -STOP IV Flagyl today  -Gen surg and ID consulted  -Leukocytosis improved  -Holding probiotic     Rest as above

## 2025-05-18 NOTE — PROGRESS NOTE ADULT - ASSESSMENT
75F w/ recent admission for sepsis 2/2 c. diff colitis, now re-admitted to medicine service for continued episodes of diarrhea and presyncopal sensation. Surgery consulted for concern of fulminant colitis, toxic megacolon. Overall low clinical suspicion for fulminant colitis/toxic megacolon given normal vitals, unremarkable abdominal exam. CT scan findings of thickening of descending & rectosigmoid colon likely due to continued c diff infection. Patient now s/p treatment with IV flagyl and PO/AR vancomycin with significant clinical improvement.    Cont IV Flagyl and PO Vanc  Diet can be advanced as tolerated

## 2025-05-18 NOTE — PROGRESS NOTE ADULT - SUBJECTIVE AND OBJECTIVE BOX
INTERVAL HPI/OVERNIGHT EVENTS: Diarrhea      SUBJECTIVE: Patient seen at the bedside today. Patient lying in bed resting comfortably in NAD. Reports LLQ pain and diarrhea however reports feeling better compared to yesterday. Reports having flatus. Tolerating diet. -N/-V. OOBA      enoxaparin Injectable 30 milliGRAM(s) SubCutaneous every 24 hours  vancomycin    Solution 500 milliGRAM(s) Oral every 6 hours      Vital Signs Last 24 Hrs  T(C): 36.9 (18 May 2025 08:45), Max: 37.1 (18 May 2025 05:35)  T(F): 98.4 (18 May 2025 08:45), Max: 98.8 (18 May 2025 05:35)  HR: 75 (18 May 2025 08:45) (74 - 84)  BP: 136/75 (18 May 2025 08:45) (126/60 - 143/77)  BP(mean): --  RR: 18 (18 May 2025 08:45) (17 - 18)  SpO2: 96% (18 May 2025 08:45) (96% - 98%)    Parameters below as of 18 May 2025 08:45  Patient On (Oxygen Delivery Method): room air      I&O's Detail    17 May 2025 07:01  -  18 May 2025 07:00  --------------------------------------------------------  IN:    Oral Fluid: 430 mL  Total IN: 430 mL    OUT:    Voided (mL): 550 mL  Total OUT: 550 mL    Total NET: -120 mL          General: NAD, resting comfortably in bed  C/V: NSR  Pulm: Nonlabored breathing, no respiratory distress  Abd: soft, TTP LLQ, mildly distended, no rebound or guarding   Extrem: WWP, no edema, SCDs in place  Drains:  Enrique:      LABS:                        11.1   9.02  )-----------( 230      ( 18 May 2025 05:35 )             33.9     05-18    142  |  108  |  3[L]  ----------------------------<  97  3.7   |  25  |  0.71    Ca    8.2[L]      18 May 2025 05:35  Phos  2.7     05-18  Mg     1.9     05-18    TPro  4.9[L]  /  Alb  2.8[L]  /  TBili  0.2  /  DBili  x   /  AST  16  /  ALT  13  /  AlkPhos  57  05-17      Urinalysis Basic - ( 18 May 2025 05:35 )    Color: x / Appearance: x / SG: x / pH: x  Gluc: 97 mg/dL / Ketone: x  / Bili: x / Urobili: x   Blood: x / Protein: x / Nitrite: x   Leuk Esterase: x / RBC: x / WBC x   Sq Epi: x / Non Sq Epi: x / Bacteria: x        RADIOLOGY & ADDITIONAL STUDIES:

## 2025-05-18 NOTE — PROGRESS NOTE ADULT - SUBJECTIVE AND OBJECTIVE BOX
CHULA CRAWFORD  75y  Female      Patient is a 75y old  Female who presents with a chief complaint of C Diff diarrhea (18 May 2025 12:34)      Notable Events: Resting comfortably this morning, had no bowel movements overnight, abdominal pain improved. Planning for discharge on monday.      REVIEW OF SYSTEMS:  CONSTITUTIONAL: No fever  EYES: No visual disturbances  ENMT: No hearing changes, No sore throat  RESPIRATORY: No cough, No shortness of breath  CARDIOVASCULAR: No chest pain, No palpitations  GASTROINTESTINAL: No abdominal pain, No nausea, No vomiting, No diarrhea, No melena, No hematochezia.  GENITOURINARY: No dysuria, frequency, hematuria, or incontinence  NEUROLOGICAL: No headaches, No weakness, No numbness, No tremors  SKIN: No lesions, No rashes  MUSCULOSKELETAL: No joint pain, No backpain, No extremity pain  PSYCHIATRIC: No depression, anxiety, or difficulty sleeping  FAMILY HISTORY:    Vital Signs Last 24 Hrs  T(C): 37 (18 May 2025 20:50), Max: 37.1 (18 May 2025 05:35)  T(F): 98.6 (18 May 2025 20:50), Max: 98.8 (18 May 2025 05:35)  HR: 79 (18 May 2025 20:50) (74 - 90)  BP: 158/81 (18 May 2025 20:50) (126/60 - 158/81)  BP(mean): 106 (18 May 2025 20:50) (106 - 106)  RR: 17 (18 May 2025 20:50) (17 - 18)  SpO2: 98% (18 May 2025 20:50) (95% - 99%)    Parameters below as of 18 May 2025 20:50  Patient On (Oxygen Delivery Method): room air      sulfa drugs (Swelling)  penicillins (Flushing)  [place] (Vomiting)  IV Contrast (Other)      PHYSICAL EXAM:  GENERAL: No acute distress  HEAD:  Atraumatic, Normocephalic  EYES: Normal extraocular movements. Conjunctiva and sclera are normal  NECK: Supple. Normal thyroid. No lymphadenopathy  NERVOUS SYSTEM:  Alert & Oriented X3. Motor Strength 5/5 B/L upper and lower extremities; DTRs 2+ intact and symmetric.  CHEST/LUNG: No wheezing or crackles present.  CARDIAC: Regular rate and rhythm. No murmurs or rubs. Jugular venous pressure is normal.  ABDOMEN: Nontender. Nondistended. Soft abdomen.  EXTREMITIES:  2+ Peripheral Pulses, No clubbing, cyanosis, or edema.  SKIN: No rashes or lesions    Consultant(s) Notes Reviewed:  [x ] YES  [ ] NO  Care Discussed with Consultants/Other Providers [ x] YES  [ ] NO    LABS:                        11.1   9.02  )-----------( 230      ( 18 May 2025 05:35 )             33.9     05-18    142  |  108  |  3[L]  ----------------------------<  97  3.7   |  25  |  0.71    Ca    8.2[L]      18 May 2025 05:35  Phos  2.7     05-18  Mg     1.9     05-18    TPro  4.9[L]  /  Alb  2.8[L]  /  TBili  0.2  /  DBili  x   /  AST  16  /  ALT  13  /  AlkPhos  57  05-17          Urinalysis Basic - ( 18 May 2025 05:35 )    Color: x / Appearance: x / SG: x / pH: x  Gluc: 97 mg/dL / Ketone: x  / Bili: x / Urobili: x   Blood: x / Protein: x / Nitrite: x   Leuk Esterase: x / RBC: x / WBC x   Sq Epi: x / Non Sq Epi: x / Bacteria: x          RADIOLOGY & ADDITIONAL TESTS:    Imaging Personally Reviewed:  [x] YES  [ ] NO  acetaminophen     Tablet .. 650 milliGRAM(s) Oral every 6 hours PRN  chlorhexidine 2% Cloths 1 Application(s) Topical daily  enoxaparin Injectable 30 milliGRAM(s) SubCutaneous every 24 hours  estradiol 0.5 milliGRAM(s) Oral daily  hyoscyamine SL 0.125 milliGRAM(s) SubLingual two times a day PRN  lactated ringers. 1000 milliLiter(s) IV Continuous <Continuous>  melatonin 3 milliGRAM(s) Oral at bedtime PRN  ondansetron Injectable 4 milliGRAM(s) IV Push every 8 hours PRN  pantoprazole    Tablet 20 milliGRAM(s) Oral before breakfast  progesterone 100 milliGRAM(s) Oral daily  vancomycin    Solution 500 milliGRAM(s) Oral every 6 hours      HEALTH ISSUES - PROBLEM Dx:  Clostridium difficile colitis    Prophylactic measure    Sepsis    Post menopausal syndrome

## 2025-05-19 LAB
ALBUMIN SERPL ELPH-MCNC: 3.4 G/DL — SIGNIFICANT CHANGE UP (ref 3.3–5)
ALP SERPL-CCNC: 71 U/L — SIGNIFICANT CHANGE UP (ref 40–120)
ALT FLD-CCNC: 32 U/L — SIGNIFICANT CHANGE UP (ref 10–45)
ANION GAP SERPL CALC-SCNC: 7 MMOL/L — SIGNIFICANT CHANGE UP (ref 5–17)
AST SERPL-CCNC: 53 U/L — HIGH (ref 10–40)
BASOPHILS # BLD AUTO: 0.06 K/UL — SIGNIFICANT CHANGE UP (ref 0–0.2)
BASOPHILS NFR BLD AUTO: 0.5 % — SIGNIFICANT CHANGE UP (ref 0–2)
BILIRUB SERPL-MCNC: 0.2 MG/DL — SIGNIFICANT CHANGE UP (ref 0.2–1.2)
BUN SERPL-MCNC: 6 MG/DL — LOW (ref 7–23)
CALCIUM SERPL-MCNC: 8.8 MG/DL — SIGNIFICANT CHANGE UP (ref 8.4–10.5)
CHLORIDE SERPL-SCNC: 105 MMOL/L — SIGNIFICANT CHANGE UP (ref 96–108)
CO2 SERPL-SCNC: 29 MMOL/L — SIGNIFICANT CHANGE UP (ref 22–31)
CREAT SERPL-MCNC: 0.73 MG/DL — SIGNIFICANT CHANGE UP (ref 0.5–1.3)
EGFR: 86 ML/MIN/1.73M2 — SIGNIFICANT CHANGE UP
EGFR: 86 ML/MIN/1.73M2 — SIGNIFICANT CHANGE UP
EOSINOPHIL # BLD AUTO: 0.13 K/UL — SIGNIFICANT CHANGE UP (ref 0–0.5)
EOSINOPHIL NFR BLD AUTO: 1.1 % — SIGNIFICANT CHANGE UP (ref 0–6)
GLUCOSE SERPL-MCNC: 97 MG/DL — SIGNIFICANT CHANGE UP (ref 70–99)
HCT VFR BLD CALC: 37.7 % — SIGNIFICANT CHANGE UP (ref 34.5–45)
HGB BLD-MCNC: 12.6 G/DL — SIGNIFICANT CHANGE UP (ref 11.5–15.5)
IMM GRANULOCYTES NFR BLD AUTO: 4.4 % — HIGH (ref 0–0.9)
LYMPHOCYTES # BLD AUTO: 2.47 K/UL — SIGNIFICANT CHANGE UP (ref 1–3.3)
LYMPHOCYTES # BLD AUTO: 21.5 % — SIGNIFICANT CHANGE UP (ref 13–44)
MAGNESIUM SERPL-MCNC: 2 MG/DL — SIGNIFICANT CHANGE UP (ref 1.6–2.6)
MCHC RBC-ENTMCNC: 29.7 PG — SIGNIFICANT CHANGE UP (ref 27–34)
MCHC RBC-ENTMCNC: 33.4 G/DL — SIGNIFICANT CHANGE UP (ref 32–36)
MCV RBC AUTO: 88.9 FL — SIGNIFICANT CHANGE UP (ref 80–100)
MONOCYTES # BLD AUTO: 0.72 K/UL — SIGNIFICANT CHANGE UP (ref 0–0.9)
MONOCYTES NFR BLD AUTO: 6.3 % — SIGNIFICANT CHANGE UP (ref 2–14)
NEUTROPHILS # BLD AUTO: 7.61 K/UL — HIGH (ref 1.8–7.4)
NEUTROPHILS NFR BLD AUTO: 66.2 % — SIGNIFICANT CHANGE UP (ref 43–77)
NRBC BLD AUTO-RTO: 0 /100 WBCS — SIGNIFICANT CHANGE UP (ref 0–0)
PHOSPHATE SERPL-MCNC: 3.2 MG/DL — SIGNIFICANT CHANGE UP (ref 2.5–4.5)
PLATELET # BLD AUTO: 246 K/UL — SIGNIFICANT CHANGE UP (ref 150–400)
POTASSIUM SERPL-MCNC: 4.1 MMOL/L — SIGNIFICANT CHANGE UP (ref 3.5–5.3)
POTASSIUM SERPL-SCNC: 4.1 MMOL/L — SIGNIFICANT CHANGE UP (ref 3.5–5.3)
PROT SERPL-MCNC: 5.6 G/DL — LOW (ref 6–8.3)
RBC # BLD: 4.24 M/UL — SIGNIFICANT CHANGE UP (ref 3.8–5.2)
RBC # FLD: 14.4 % — SIGNIFICANT CHANGE UP (ref 10.3–14.5)
SODIUM SERPL-SCNC: 141 MMOL/L — SIGNIFICANT CHANGE UP (ref 135–145)
WBC # BLD: 11.5 K/UL — HIGH (ref 3.8–10.5)
WBC # FLD AUTO: 11.5 K/UL — HIGH (ref 3.8–10.5)

## 2025-05-19 PROCEDURE — G0545: CPT

## 2025-05-19 PROCEDURE — 99233 SBSQ HOSP IP/OBS HIGH 50: CPT | Mod: GC

## 2025-05-19 PROCEDURE — 99232 SBSQ HOSP IP/OBS MODERATE 35: CPT

## 2025-05-19 RX ORDER — METRONIDAZOLE 250 MG
500 TABLET ORAL EVERY 8 HOURS
Refills: 0 | Status: DISCONTINUED | OUTPATIENT
Start: 2025-05-19 | End: 2025-05-19

## 2025-05-19 RX ADMIN — Medication 0.5 MILLIGRAM(S): at 22:33

## 2025-05-19 RX ADMIN — Medication 500 MILLIGRAM(S): at 18:30

## 2025-05-19 RX ADMIN — Medication 500 MILLIGRAM(S): at 06:31

## 2025-05-19 RX ADMIN — Medication 500 MILLIGRAM(S): at 00:06

## 2025-05-19 RX ADMIN — Medication 500 MILLIGRAM(S): at 12:38

## 2025-05-19 RX ADMIN — Medication 20 MILLIGRAM(S): at 06:31

## 2025-05-19 RX ADMIN — Medication 1 APPLICATION(S): at 12:41

## 2025-05-19 NOTE — PROGRESS NOTE ADULT - ASSESSMENT
75F w/ hx diverticulitis, prior episode of ischemic colitis in 2016, GERD on PPI, and recent admission 4/6-11/25 for colitis, possibly ischemic, managed conservatively with IVF, bowel rest, and 5 days of ceftriaxone/flagyl. Then admitted 5/9-12/2025 with worsening diarrhea (was having 30 BM/day, loose, with cramping and chills), given a dose of cipro/flagyl but ultimately found to have CDI colitis (severe with WBC 19k), CTAP w/ PO contrast normal, treated with PO vanc and diarrhea/cramping/chills all resolved, WBC normalized and patient discharged home. Then came back to the following day (5/13) with worsening diarrhea and LLQ. WBC up to 17.5k -> 20k, repeat CTAP w/ PO contrast with new wall thickening of descending/rectosigmoid colon and marked stool throughout the large bowel concerning for fecal stasis. Lactate and vital signs normal. Clinically no toxic megacolon or ileus, but treating aggressively as worsened on standard vancomycin dosing. Now WBC normalized and abdominal symptoms improving on high dose PO vanc + TX vanc + IV flagyl. TX vanc stopped 5/17, IV flagyl stopped 5/18.    Today patient's symptoms remain improved, but WBC slightly up to 11.5k, will monitor.    # CDI, first episode, severe  - Continue vancomycin 500mg PO q6h  - If patient continues to improve/WBC normalizes will plan for discharge home tomorrow with vanc 500mg PO q6h to complete a 14 day course from 5/13.    ID Team 2 will follow

## 2025-05-19 NOTE — PROGRESS NOTE ADULT - SUBJECTIVE AND OBJECTIVE BOX
CHULA CRAWFORD  75y  Female      Patient is a 75y old  Female who presents with a chief complaint of C Diff diarrhea (18 May 2025 12:34)        Notable Events:      REVIEW OF SYSTEMS:  CONSTITUTIONAL: No fever  EYES: No visual disturbances  ENMT: No hearing changes, No sore throat  RESPIRATORY: No cough, No shortness of breath  CARDIOVASCULAR: No chest pain, No palpitations  GASTROINTESTINAL: No abdominal pain, No nausea, No vomiting, No diarrhea, No melena, No hematochezia.  GENITOURINARY: No dysuria, frequency, hematuria, or incontinence  NEUROLOGICAL: No headaches, No weakness, No numbness, No tremors  SKIN: No lesions, No rashes  MUSCULOSKELETAL: No joint pain, No backpain, No extremity pain  PSYCHIATRIC: No depression, anxiety, or difficulty sleeping  FAMILY HISTORY:    Vital Signs Last 24 Hrs  T(C): 37.1 (19 May 2025 05:12), Max: 37.1 (19 May 2025 05:12)  T(F): 98.7 (19 May 2025 05:12), Max: 98.7 (19 May 2025 05:12)  HR: 72 (19 May 2025 05:12) (72 - 90)  BP: 125/73 (19 May 2025 05:12) (125/73 - 158/81)  BP(mean): 90 (19 May 2025 05:12) (90 - 106)  RR: 17 (19 May 2025 05:12) (17 - 18)  SpO2: 98% (19 May 2025 05:12) (95% - 99%)    Parameters below as of 19 May 2025 05:12  Patient On (Oxygen Delivery Method): room air      sulfa drugs (Swelling)  penicillins (Flushing)  [place] (Vomiting)  IV Contrast (Other)      PHYSICAL EXAM:  GENERAL: No acute distress  HEAD:  Atraumatic, Normocephalic  EYES: Normal extraocular movements. Conjunctiva and sclera are normal  NECK: Supple. Normal thyroid. No lymphadenopathy  NERVOUS SYSTEM:  Alert & Oriented X3. Motor Strength 5/5 B/L upper and lower extremities; DTRs 2+ intact and symmetric.  CHEST/LUNG: No wheezing or crackles present.  CARDIAC: Regular rate and rhythm. No murmurs or rubs. Jugular venous pressure is normal.  ABDOMEN: Nontender. Nondistended. Soft abdomen.  EXTREMITIES:  2+ Peripheral Pulses, No clubbing, cyanosis, or edema.  SKIN: No rashes or lesions    Consultant(s) Notes Reviewed:  [x ] YES  [ ] NO  Care Discussed with Consultants/Other Providers [ x] YES  [ ] NO    LABS:                        11.1   9.02  )-----------( 230      ( 18 May 2025 05:35 )             33.9     05-18    142  |  108  |  3[L]  ----------------------------<  97  3.7   |  25  |  0.71    Ca    8.2[L]      18 May 2025 05:35  Phos  2.7     05-18  Mg     1.9     05-18            Urinalysis Basic - ( 18 May 2025 05:35 )    Color: x / Appearance: x / SG: x / pH: x  Gluc: 97 mg/dL / Ketone: x  / Bili: x / Urobili: x   Blood: x / Protein: x / Nitrite: x   Leuk Esterase: x / RBC: x / WBC x   Sq Epi: x / Non Sq Epi: x / Bacteria: x          RADIOLOGY & ADDITIONAL TESTS:    Imaging Personally Reviewed:  [x] YES  [ ] NO  acetaminophen     Tablet .. 650 milliGRAM(s) Oral every 6 hours PRN  chlorhexidine 2% Cloths 1 Application(s) Topical daily  enoxaparin Injectable 30 milliGRAM(s) SubCutaneous every 24 hours  estradiol 0.5 milliGRAM(s) Oral daily  hyoscyamine SL 0.125 milliGRAM(s) SubLingual two times a day PRN  lactated ringers. 1000 milliLiter(s) IV Continuous <Continuous>  melatonin 3 milliGRAM(s) Oral at bedtime PRN  ondansetron Injectable 4 milliGRAM(s) IV Push every 8 hours PRN  pantoprazole    Tablet 20 milliGRAM(s) Oral before breakfast  progesterone 100 milliGRAM(s) Oral daily  vancomycin    Solution 500 milliGRAM(s) Oral every 6 hours      HEALTH ISSUES - PROBLEM Dx:  Clostridium difficile colitis    Prophylactic measure    Sepsis    Post menopausal syndrome           CHULA CRAWFORD  75y  Female      Patient is a 75y old  Female who presents with a chief complaint of C Diff diarrhea (18 May 2025 12:34)      Notable Events: No bowel movements overnight but now up-trending white count from yesterday. Will watch her again off of metronidazole IV and see if this was just fluctuation or true regression from treatment.      REVIEW OF SYSTEMS:  CONSTITUTIONAL: No fever  EYES: No visual disturbances  ENMT: No hearing changes, No sore throat  RESPIRATORY: No cough, No shortness of breath  CARDIOVASCULAR: No chest pain, No palpitations  GASTROINTESTINAL: No abdominal pain, No nausea, No vomiting, No diarrhea, No melena, No hematochezia.  GENITOURINARY: No dysuria, frequency, hematuria, or incontinence  NEUROLOGICAL: No headaches, No weakness, No numbness, No tremors  SKIN: No lesions, No rashes  MUSCULOSKELETAL: No joint pain, No backpain, No extremity pain  PSYCHIATRIC: No depression, anxiety, or difficulty sleeping  FAMILY HISTORY:    Vital Signs Last 24 Hrs  T(C): 37.1 (19 May 2025 05:12), Max: 37.1 (19 May 2025 05:12)  T(F): 98.7 (19 May 2025 05:12), Max: 98.7 (19 May 2025 05:12)  HR: 72 (19 May 2025 05:12) (72 - 90)  BP: 125/73 (19 May 2025 05:12) (125/73 - 158/81)  BP(mean): 90 (19 May 2025 05:12) (90 - 106)  RR: 17 (19 May 2025 05:12) (17 - 18)  SpO2: 98% (19 May 2025 05:12) (95% - 99%)    Parameters below as of 19 May 2025 05:12  Patient On (Oxygen Delivery Method): room air      sulfa drugs (Swelling)  penicillins (Flushing)  [place] (Vomiting)  IV Contrast (Other)      PHYSICAL EXAM:  GENERAL: No acute distress  HEAD:  Atraumatic, Normocephalic  EYES: Normal extraocular movements. Conjunctiva and sclera are normal  NECK: Supple. Normal thyroid. No lymphadenopathy  NERVOUS SYSTEM:  Alert & Oriented X3. Motor Strength 5/5 B/L upper and lower extremities; DTRs 2+ intact and symmetric.  CHEST/LUNG: No wheezing or crackles present.  CARDIAC: Regular rate and rhythm. No murmurs or rubs. Jugular venous pressure is normal.  ABDOMEN: Nontender. Nondistended. Soft abdomen.  EXTREMITIES:  2+ Peripheral Pulses, No clubbing, cyanosis, or edema.  SKIN: No rashes or lesions    Consultant(s) Notes Reviewed:  [x ] YES  [ ] NO  Care Discussed with Consultants/Other Providers [ x] YES  [ ] NO    LABS:                        11.1   9.02  )-----------( 230      ( 18 May 2025 05:35 )             33.9     05-18    142  |  108  |  3[L]  ----------------------------<  97  3.7   |  25  |  0.71    Ca    8.2[L]      18 May 2025 05:35  Phos  2.7     05-18  Mg     1.9     05-18            Urinalysis Basic - ( 18 May 2025 05:35 )    Color: x / Appearance: x / SG: x / pH: x  Gluc: 97 mg/dL / Ketone: x  / Bili: x / Urobili: x   Blood: x / Protein: x / Nitrite: x   Leuk Esterase: x / RBC: x / WBC x   Sq Epi: x / Non Sq Epi: x / Bacteria: x          RADIOLOGY & ADDITIONAL TESTS:    Imaging Personally Reviewed:  [x] YES  [ ] NO  acetaminophen     Tablet .. 650 milliGRAM(s) Oral every 6 hours PRN  chlorhexidine 2% Cloths 1 Application(s) Topical daily  enoxaparin Injectable 30 milliGRAM(s) SubCutaneous every 24 hours  estradiol 0.5 milliGRAM(s) Oral daily  hyoscyamine SL 0.125 milliGRAM(s) SubLingual two times a day PRN  lactated ringers. 1000 milliLiter(s) IV Continuous <Continuous>  melatonin 3 milliGRAM(s) Oral at bedtime PRN  ondansetron Injectable 4 milliGRAM(s) IV Push every 8 hours PRN  pantoprazole    Tablet 20 milliGRAM(s) Oral before breakfast  progesterone 100 milliGRAM(s) Oral daily  vancomycin    Solution 500 milliGRAM(s) Oral every 6 hours      HEALTH ISSUES - PROBLEM Dx:  Clostridium difficile colitis    Prophylactic measure    Sepsis    Post menopausal syndrome

## 2025-05-19 NOTE — PROGRESS NOTE ADULT - ATTENDING COMMENTS
75yF w/ pmhx diverticulitis, ischemic colitis, recent admit 5/9-13/25 for c. dif being treated w/ PO vancomycin, comes in for worsening diarrhea. Found to have uptrending WBC count. CT A/P with large stool burden and colitis. Admitted for further management.     Labs and imaging reviewed    Problem List  #Severe C. dif   #Sepsis POA  #Ischemic Colitis    Plan  -Continue PO Vanc  -Gen surg and ID consulted  -Leukocytosis improved  -Holding probiotic     Rest as above

## 2025-05-19 NOTE — PROGRESS NOTE ADULT - SUBJECTIVE AND OBJECTIVE BOX
INTERVAL HPI/OVERNIGHT EVENTS: Finished Flagyl and [place] vancomycin.     SUBJECTIVE:  Patient seen and examined on AM rounds. Afebrile, VSS. Refers improved abdominal pain, and improved nausea compared to the weekend. Refers she is able to tolerate some more. Refers having less bowel movements, 6 during yesterday.     MEDICATIONS  (STANDING):  chlorhexidine 2% Cloths 1 Application(s) Topical daily  enoxaparin Injectable 30 milliGRAM(s) SubCutaneous every 24 hours  estradiol 0.5 milliGRAM(s) Oral daily  lactated ringers. 1000 milliLiter(s) (100 mL/Hr) IV Continuous <Continuous>  pantoprazole    Tablet 20 milliGRAM(s) Oral before breakfast  progesterone 100 milliGRAM(s) Oral daily  vancomycin    Solution 500 milliGRAM(s) Oral every 6 hours    MEDICATIONS  (PRN):  acetaminophen     Tablet .. 650 milliGRAM(s) Oral every 6 hours PRN Temp greater or equal to 38C (100.4F), Mild Pain (1 - 3)  hyoscyamine SL 0.125 milliGRAM(s) SubLingual two times a day PRN Severe spasms  melatonin 3 milliGRAM(s) Oral at bedtime PRN Insomnia  ondansetron Injectable 4 milliGRAM(s) IV Push every 8 hours PRN Nausea and/or Vomiting      Vital Signs Last 24 Hrs  T(C): 36.6 (19 May 2025 12:24), Max: 37.1 (19 May 2025 05:12)  T(F): 97.8 (19 May 2025 12:24), Max: 98.7 (19 May 2025 05:12)  HR: 81 (19 May 2025 12:24) (72 - 90)  BP: 146/71 (19 May 2025 12:27) (125/73 - 162/72)  BP(mean): 90 (19 May 2025 05:12) (90 - 106)  RR: 17 (19 May 2025 12:24) (17 - 17)  SpO2: 97% (19 May 2025 12:24) (95% - 99%)    Parameters below as of 19 May 2025 12:24  Patient On (Oxygen Delivery Method): room air        PHYSICAL EXAM:    Constitutional: A&Ox3  Respiratory: non labored breathing, no respiratory distress  Cardiovascular: NSR, RRR  Gastrointestinal: Soft, mild TTP more marked in LLQ; mild distention, no guarding and no rigidity.  Genitourinary: Voiding freely  Extremities: (-) edema        I&O's Detail    18 May 2025 07:01  -  19 May 2025 07:00  --------------------------------------------------------  IN:    Oral Fluid: 925 mL  Total IN: 925 mL    OUT:  Total OUT: 0 mL    Total NET: 925 mL      19 May 2025 07:01  -  19 May 2025 15:33  --------------------------------------------------------  IN:    Oral Fluid: 920 mL  Total IN: 920 mL    OUT:  Total OUT: 0 mL    Total NET: 920 mL          LABS:                        12.6   11.50 )-----------( 246      ( 19 May 2025 05:30 )             37.7     05-19    141  |  105  |  6[L]  ----------------------------<  97  4.1   |  29  |  0.73    Ca    8.8      19 May 2025 05:30  Phos  3.2     05-19  Mg     2.0     05-19    TPro  5.6[L]  /  Alb  3.4  /  TBili  0.2  /  DBili  x   /  AST  53[H]  /  ALT  32  /  AlkPhos  71  05-19      Urinalysis Basic - ( 19 May 2025 05:30 )    Color: x / Appearance: x / SG: x / pH: x  Gluc: 97 mg/dL / Ketone: x  / Bili: x / Urobili: x   Blood: x / Protein: x / Nitrite: x   Leuk Esterase: x / RBC: x / WBC x   Sq Epi: x / Non Sq Epi: x / Bacteria: x        RADIOLOGY & ADDITIONAL STUDIES:

## 2025-05-19 NOTE — PROGRESS NOTE ADULT - ASSESSMENT
75F w/ recent admission for sepsis 2/2 c. diff colitis, now re-admitted to medicine service for continued episodes of diarrhea and presyncopal sensation. Surgery consulted for concern of fulminant colitis, toxic megacolon. Overall low clinical suspicion for fulminant colitis/toxic megacolon given normal vitals, unremarkable abdominal exam. CT scan findings of thickening of descending & rectosigmoid colon likely due to continued c diff infection. Patient now s/p treatment with IV flagyl and PO/OR vancomycin with significant clinical improvement.    Recommendations:    Continue PO vancomycin  Increase PO intake  Pain/nausea PRN  F/up outpatient with Dr. Isaac in 2 weeks    General surgery team 4c will sign off

## 2025-05-19 NOTE — PROGRESS NOTE ADULT - SUBJECTIVE AND OBJECTIVE BOX
INFECTIOUS DISEASES CONSULT FOLLOW-UP NOTE    INTERVAL HPI/OVERNIGHT EVENTS:  Afebrile. BM x 6 yesterday (liquid). None so far today. LLQ pain remains minimal. Appetite improving.    ROS:   Constitutional, eyes, ENT, cardiovascular, respiratory, gastrointestinal, genitourinary, integumentary, neurological, psychiatric and heme/lymph are otherwise negative other than noted above       ANTIBIOTICS/RELEVANT:    MEDICATIONS  (STANDING):  chlorhexidine 2% Cloths 1 Application(s) Topical daily  enoxaparin Injectable 30 milliGRAM(s) SubCutaneous every 24 hours  estradiol 0.5 milliGRAM(s) Oral daily  lactated ringers. 1000 milliLiter(s) (100 mL/Hr) IV Continuous <Continuous>  pantoprazole    Tablet 20 milliGRAM(s) Oral before breakfast  progesterone 100 milliGRAM(s) Oral daily  vancomycin    Solution 500 milliGRAM(s) Oral every 6 hours    MEDICATIONS  (PRN):  acetaminophen     Tablet .. 650 milliGRAM(s) Oral every 6 hours PRN Temp greater or equal to 38C (100.4F), Mild Pain (1 - 3)  hyoscyamine SL 0.125 milliGRAM(s) SubLingual two times a day PRN Severe spasms  melatonin 3 milliGRAM(s) Oral at bedtime PRN Insomnia  ondansetron Injectable 4 milliGRAM(s) IV Push every 8 hours PRN Nausea and/or Vomiting        Vital Signs Last 24 Hrs  T(C): 36.6 (19 May 2025 12:24), Max: 37.1 (19 May 2025 05:12)  T(F): 97.8 (19 May 2025 12:24), Max: 98.7 (19 May 2025 05:12)  HR: 81 (19 May 2025 12:24) (72 - 90)  BP: 146/71 (19 May 2025 12:27) (125/73 - 162/72)  BP(mean): 90 (19 May 2025 05:12) (90 - 106)  RR: 17 (19 May 2025 12:24) (17 - 17)  SpO2: 97% (19 May 2025 12:24) (95% - 99%)    Parameters below as of 19 May 2025 12:24  Patient On (Oxygen Delivery Method): room air        05-18-25 @ 07:01  -  05-19-25 @ 07:00  --------------------------------------------------------  IN: 925 mL / OUT: 0 mL / NET: 925 mL    05-19-25 @ 07:01  -  05-19-25 @ 13:16  --------------------------------------------------------  IN: 920 mL / OUT: 0 mL / NET: 920 mL      PHYSICAL EXAM:  Constitutional: alert, NAD, non-toxic  Pulmonary: no respiratory distress  Heart: heart rate was normal and rhythm regular  Abdomen: soft, improved tenderness in LLQ, now minimal. No peritoneal signs  Neurological: no focal deficits.   Psychiatric: the affect was normal  Skin: no rash          LABS:                        12.6   11.50 )-----------( 246      ( 19 May 2025 05:30 )             37.7     05-19    141  |  105  |  6[L]  ----------------------------<  97  4.1   |  29  |  0.73    Ca    8.8      19 May 2025 05:30  Phos  3.2     05-19  Mg     2.0     05-19    TPro  5.6[L]  /  Alb  3.4  /  TBili  0.2  /  DBili  x   /  AST  53[H]  /  ALT  32  /  AlkPhos  71  05-19      Urinalysis Basic - ( 19 May 2025 05:30 )    Color: x / Appearance: x / SG: x / pH: x  Gluc: 97 mg/dL / Ketone: x  / Bili: x / Urobili: x   Blood: x / Protein: x / Nitrite: x   Leuk Esterase: x / RBC: x / WBC x   Sq Epi: x / Non Sq Epi: x / Bacteria: x        MICROBIOLOGY:  Reviewed    RADIOLOGY & ADDITIONAL STUDIES:  Reviewed

## 2025-05-19 NOTE — PROGRESS NOTE ADULT - SUBJECTIVE AND OBJECTIVE BOX
BMs erratic, not consistently liquid or frequent.  Feels closer to baseline  AFVSS  Abd soft, ND, minimal LLQ TTP    WBC remains normal.    A/P: C diff colitis, improved.  1. PO Vanco  2. Off metronidazole and vanco SD  3. Encouraged po intake.  4. Can see me in office in 2 weeks.

## 2025-05-20 ENCOUNTER — TRANSCRIPTION ENCOUNTER (OUTPATIENT)
Age: 75
End: 2025-05-20

## 2025-05-20 VITALS
TEMPERATURE: 98 F | RESPIRATION RATE: 17 BRPM | DIASTOLIC BLOOD PRESSURE: 68 MMHG | SYSTOLIC BLOOD PRESSURE: 139 MMHG | HEART RATE: 81 BPM | OXYGEN SATURATION: 95 %

## 2025-05-20 DIAGNOSIS — R65.20 SEVERE SEPSIS WITHOUT SEPTIC SHOCK: ICD-10-CM

## 2025-05-20 DIAGNOSIS — Z88.0 ALLERGY STATUS TO PENICILLIN: ICD-10-CM

## 2025-05-20 DIAGNOSIS — A41.9 SEPSIS, UNSPECIFIED ORGANISM: ICD-10-CM

## 2025-05-20 DIAGNOSIS — N95.9 UNSPECIFIED MENOPAUSAL AND PERIMENOPAUSAL DISORDER: ICD-10-CM

## 2025-05-20 DIAGNOSIS — E87.6 HYPOKALEMIA: ICD-10-CM

## 2025-05-20 DIAGNOSIS — Z79.82 LONG TERM (CURRENT) USE OF ASPIRIN: ICD-10-CM

## 2025-05-20 DIAGNOSIS — A04.72 ENTEROCOLITIS DUE TO CLOSTRIDIUM DIFFICILE, NOT SPECIFIED AS RECURRENT: ICD-10-CM

## 2025-05-20 DIAGNOSIS — Z88.2 ALLERGY STATUS TO SULFONAMIDES: ICD-10-CM

## 2025-05-20 LAB
ANION GAP SERPL CALC-SCNC: 10 MMOL/L — SIGNIFICANT CHANGE UP (ref 5–17)
BASOPHILS # BLD AUTO: 0 K/UL — SIGNIFICANT CHANGE UP (ref 0–0.2)
BASOPHILS NFR BLD AUTO: 0 % — SIGNIFICANT CHANGE UP (ref 0–2)
BUN SERPL-MCNC: 9 MG/DL — SIGNIFICANT CHANGE UP (ref 7–23)
CALCIUM SERPL-MCNC: 8.9 MG/DL — SIGNIFICANT CHANGE UP (ref 8.4–10.5)
CHLORIDE SERPL-SCNC: 105 MMOL/L — SIGNIFICANT CHANGE UP (ref 96–108)
CO2 SERPL-SCNC: 27 MMOL/L — SIGNIFICANT CHANGE UP (ref 22–31)
CREAT SERPL-MCNC: 0.79 MG/DL — SIGNIFICANT CHANGE UP (ref 0.5–1.3)
EGFR: 78 ML/MIN/1.73M2 — SIGNIFICANT CHANGE UP
EGFR: 78 ML/MIN/1.73M2 — SIGNIFICANT CHANGE UP
EOSINOPHIL # BLD AUTO: 0.09 K/UL — SIGNIFICANT CHANGE UP (ref 0–0.5)
EOSINOPHIL NFR BLD AUTO: 0.9 % — SIGNIFICANT CHANGE UP (ref 0–6)
GLUCOSE SERPL-MCNC: 98 MG/DL — SIGNIFICANT CHANGE UP (ref 70–99)
HCT VFR BLD CALC: 36.6 % — SIGNIFICANT CHANGE UP (ref 34.5–45)
HGB BLD-MCNC: 11.9 G/DL — SIGNIFICANT CHANGE UP (ref 11.5–15.5)
LYMPHOCYTES # BLD AUTO: 2.91 K/UL — SIGNIFICANT CHANGE UP (ref 1–3.3)
LYMPHOCYTES # BLD AUTO: 29.5 % — SIGNIFICANT CHANGE UP (ref 13–44)
MCHC RBC-ENTMCNC: 29.2 PG — SIGNIFICANT CHANGE UP (ref 27–34)
MCHC RBC-ENTMCNC: 32.5 G/DL — SIGNIFICANT CHANGE UP (ref 32–36)
MCV RBC AUTO: 89.9 FL — SIGNIFICANT CHANGE UP (ref 80–100)
MONOCYTES # BLD AUTO: 0.51 K/UL — SIGNIFICANT CHANGE UP (ref 0–0.9)
MONOCYTES NFR BLD AUTO: 5.2 % — SIGNIFICANT CHANGE UP (ref 2–14)
NEUTROPHILS # BLD AUTO: 6.26 K/UL — SIGNIFICANT CHANGE UP (ref 1.8–7.4)
NEUTROPHILS NFR BLD AUTO: 63.5 % — SIGNIFICANT CHANGE UP (ref 43–77)
PLATELET # BLD AUTO: 237 K/UL — SIGNIFICANT CHANGE UP (ref 150–400)
POTASSIUM SERPL-MCNC: 4.3 MMOL/L — SIGNIFICANT CHANGE UP (ref 3.5–5.3)
POTASSIUM SERPL-SCNC: 4.3 MMOL/L — SIGNIFICANT CHANGE UP (ref 3.5–5.3)
RBC # BLD: 4.07 M/UL — SIGNIFICANT CHANGE UP (ref 3.8–5.2)
RBC # FLD: 14.6 % — HIGH (ref 10.3–14.5)
SODIUM SERPL-SCNC: 142 MMOL/L — SIGNIFICANT CHANGE UP (ref 135–145)
WBC # BLD: 9.86 K/UL — SIGNIFICANT CHANGE UP (ref 3.8–10.5)
WBC # FLD AUTO: 9.86 K/UL — SIGNIFICANT CHANGE UP (ref 3.8–10.5)

## 2025-05-20 PROCEDURE — G0545: CPT

## 2025-05-20 PROCEDURE — 99239 HOSP IP/OBS DSCHRG MGMT >30: CPT

## 2025-05-20 PROCEDURE — 99233 SBSQ HOSP IP/OBS HIGH 50: CPT

## 2025-05-20 RX ORDER — VANCOMYCIN HCL IN 5 % DEXTROSE 1.5G/250ML
5 PLASTIC BAG, INJECTION (ML) INTRAVENOUS
Refills: 0
Start: 2025-05-20

## 2025-05-20 RX ORDER — VANCOMYCIN HCL IN 5 % DEXTROSE 1.5G/250ML
2 PLASTIC BAG, INJECTION (ML) INTRAVENOUS
Qty: 56 | Refills: 0
Start: 2025-05-20 | End: 2025-05-26

## 2025-05-20 RX ORDER — VANCOMYCIN HCL IN 5 % DEXTROSE 1.5G/250ML
10 PLASTIC BAG, INJECTION (ML) INTRAVENOUS
Qty: 1 | Refills: 0
Start: 2025-05-20 | End: 2025-05-26

## 2025-05-20 RX ORDER — VANCOMYCIN HCL IN 5 % DEXTROSE 1.5G/250ML
1 PLASTIC BAG, INJECTION (ML) INTRAVENOUS
Qty: 28 | Refills: 0
Start: 2025-05-20 | End: 2025-05-26

## 2025-05-20 RX ADMIN — Medication 1 APPLICATION(S): at 12:26

## 2025-05-20 RX ADMIN — Medication 500 MILLIGRAM(S): at 12:23

## 2025-05-20 RX ADMIN — Medication 500 MILLIGRAM(S): at 00:07

## 2025-05-20 RX ADMIN — Medication 20 MILLIGRAM(S): at 06:43

## 2025-05-20 RX ADMIN — Medication 500 MILLIGRAM(S): at 17:54

## 2025-05-20 RX ADMIN — Medication 500 MILLIGRAM(S): at 06:43

## 2025-05-20 NOTE — PROGRESS NOTE ADULT - SUBJECTIVE AND OBJECTIVE BOX
CHULA CRAWFORD  75y  Female      Patient is a 75y old  Female who presents with a chief complaint of C Diff diarrhea (19 May 2025 15:33)        Notable Events:      REVIEW OF SYSTEMS:  CONSTITUTIONAL: No fever  EYES: No visual disturbances  ENMT: No hearing changes, No sore throat  RESPIRATORY: No cough, No shortness of breath  CARDIOVASCULAR: No chest pain, No palpitations  GASTROINTESTINAL: No abdominal pain, No nausea, No vomiting, No diarrhea, No melena, No hematochezia.  GENITOURINARY: No dysuria, frequency, hematuria, or incontinence  NEUROLOGICAL: No headaches, No weakness, No numbness, No tremors  SKIN: No lesions, No rashes  MUSCULOSKELETAL: No joint pain, No backpain, No extremity pain  PSYCHIATRIC: No depression, anxiety, or difficulty sleeping  FAMILY HISTORY:    Vital Signs Last 24 Hrs  T(C): 36.7 (20 May 2025 06:41), Max: 36.8 (19 May 2025 09:03)  T(F): 98 (20 May 2025 06:41), Max: 98.3 (19 May 2025 09:03)  HR: 73 (20 May 2025 06:41) (73 - 89)  BP: 134/75 (20 May 2025 06:41) (134/75 - 162/72)  BP(mean): --  RR: 18 (20 May 2025 06:41) (17 - 18)  SpO2: 97% (20 May 2025 06:41) (95% - 98%)    Parameters below as of 20 May 2025 06:41  Patient On (Oxygen Delivery Method): room air      sulfa drugs (Swelling)  penicillins (Flushing)  [place] (Vomiting)  IV Contrast (Other)      PHYSICAL EXAM:  GENERAL: No acute distress  HEAD:  Atraumatic, Normocephalic  EYES: Normal extraocular movements. Conjunctiva and sclera are normal  NECK: Supple. Normal thyroid. No lymphadenopathy  NERVOUS SYSTEM:  Alert & Oriented X3. Motor Strength 5/5 B/L upper and lower extremities; DTRs 2+ intact and symmetric.  CHEST/LUNG: No wheezing or crackles present.  CARDIAC: Regular rate and rhythm. No murmurs or rubs. Jugular venous pressure is normal.  ABDOMEN: Nontender. Nondistended. Soft abdomen.  EXTREMITIES:  2+ Peripheral Pulses, No clubbing, cyanosis, or edema.  SKIN: No rashes or lesions    Consultant(s) Notes Reviewed:  [x ] YES  [ ] NO  Care Discussed with Consultants/Other Providers [ x] YES  [ ] NO    LABS:                        12.6   11.50 )-----------( 246      ( 19 May 2025 05:30 )             37.7     05-19    141  |  105  |  6[L]  ----------------------------<  97  4.1   |  29  |  0.73    Ca    8.8      19 May 2025 05:30  Phos  3.2     05-19  Mg     2.0     05-19    TPro  5.6[L]  /  Alb  3.4  /  TBili  0.2  /  DBili  x   /  AST  53[H]  /  ALT  32  /  AlkPhos  71  05-19          Urinalysis Basic - ( 19 May 2025 05:30 )    Color: x / Appearance: x / SG: x / pH: x  Gluc: 97 mg/dL / Ketone: x  / Bili: x / Urobili: x   Blood: x / Protein: x / Nitrite: x   Leuk Esterase: x / RBC: x / WBC x   Sq Epi: x / Non Sq Epi: x / Bacteria: x          RADIOLOGY & ADDITIONAL TESTS:    Imaging Personally Reviewed:  [x] YES  [ ] NO  acetaminophen     Tablet .. 650 milliGRAM(s) Oral every 6 hours PRN  chlorhexidine 2% Cloths 1 Application(s) Topical daily  enoxaparin Injectable 30 milliGRAM(s) SubCutaneous every 24 hours  estradiol 0.5 milliGRAM(s) Oral daily  hyoscyamine SL 0.125 milliGRAM(s) SubLingual two times a day PRN  lactated ringers. 1000 milliLiter(s) IV Continuous <Continuous>  melatonin 3 milliGRAM(s) Oral at bedtime PRN  ondansetron Injectable 4 milliGRAM(s) IV Push every 8 hours PRN  pantoprazole    Tablet 20 milliGRAM(s) Oral before breakfast  progesterone 100 milliGRAM(s) Oral daily  vancomycin    Solution 500 milliGRAM(s) Oral every 6 hours      HEALTH ISSUES - PROBLEM Dx:  Clostridium difficile colitis    Prophylactic measure    Sepsis    Post menopausal syndrome           CHULA CRAWFORD  75y  Female      Patient is a 75y old  Female who presents with a chief complaint of C Diff diarrhea (19 May 2025 15:33)      Notable Events: Only 2 bowel movements in the last 24 hours, neutral white count, patient feels comfortable going home and will plan for discharge.      REVIEW OF SYSTEMS:  CONSTITUTIONAL: No fever  EYES: No visual disturbances  ENMT: No hearing changes, No sore throat  RESPIRATORY: No cough, No shortness of breath  CARDIOVASCULAR: No chest pain, No palpitations  GASTROINTESTINAL: No abdominal pain, No nausea, No vomiting, No diarrhea, No melena, No hematochezia.  GENITOURINARY: No dysuria, frequency, hematuria, or incontinence  NEUROLOGICAL: No headaches, No weakness, No numbness, No tremors  SKIN: No lesions, No rashes  MUSCULOSKELETAL: No joint pain, No backpain, No extremity pain  PSYCHIATRIC: No depression, anxiety, or difficulty sleeping  FAMILY HISTORY:    Vital Signs Last 24 Hrs  T(C): 36.7 (20 May 2025 06:41), Max: 36.8 (19 May 2025 09:03)  T(F): 98 (20 May 2025 06:41), Max: 98.3 (19 May 2025 09:03)  HR: 73 (20 May 2025 06:41) (73 - 89)  BP: 134/75 (20 May 2025 06:41) (134/75 - 162/72)  BP(mean): --  RR: 18 (20 May 2025 06:41) (17 - 18)  SpO2: 97% (20 May 2025 06:41) (95% - 98%)    Parameters below as of 20 May 2025 06:41  Patient On (Oxygen Delivery Method): room air      sulfa drugs (Swelling)  penicillins (Flushing)  [place] (Vomiting)  IV Contrast (Other)      PHYSICAL EXAM:  GENERAL: No acute distress  HEAD:  Atraumatic, Normocephalic  EYES: Normal extraocular movements. Conjunctiva and sclera are normal  NECK: Supple. Normal thyroid. No lymphadenopathy  NERVOUS SYSTEM:  Alert & Oriented X3. Motor Strength 5/5 B/L upper and lower extremities; DTRs 2+ intact and symmetric.  CHEST/LUNG: No wheezing or crackles present.  CARDIAC: Regular rate and rhythm. No murmurs or rubs. Jugular venous pressure is normal.  ABDOMEN: Nontender. Nondistended. Soft abdomen.  EXTREMITIES:  2+ Peripheral Pulses, No clubbing, cyanosis, or edema.  SKIN: No rashes or lesions    Consultant(s) Notes Reviewed:  [x ] YES  [ ] NO  Care Discussed with Consultants/Other Providers [ x] YES  [ ] NO    LABS:                        12.6   11.50 )-----------( 246      ( 19 May 2025 05:30 )             37.7     05-19    141  |  105  |  6[L]  ----------------------------<  97  4.1   |  29  |  0.73    Ca    8.8      19 May 2025 05:30  Phos  3.2     05-19  Mg     2.0     05-19    TPro  5.6[L]  /  Alb  3.4  /  TBili  0.2  /  DBili  x   /  AST  53[H]  /  ALT  32  /  AlkPhos  71  05-19          Urinalysis Basic - ( 19 May 2025 05:30 )    Color: x / Appearance: x / SG: x / pH: x  Gluc: 97 mg/dL / Ketone: x  / Bili: x / Urobili: x   Blood: x / Protein: x / Nitrite: x   Leuk Esterase: x / RBC: x / WBC x   Sq Epi: x / Non Sq Epi: x / Bacteria: x          RADIOLOGY & ADDITIONAL TESTS:    Imaging Personally Reviewed:  [x] YES  [ ] NO  acetaminophen     Tablet .. 650 milliGRAM(s) Oral every 6 hours PRN  chlorhexidine 2% Cloths 1 Application(s) Topical daily  enoxaparin Injectable 30 milliGRAM(s) SubCutaneous every 24 hours  estradiol 0.5 milliGRAM(s) Oral daily  hyoscyamine SL 0.125 milliGRAM(s) SubLingual two times a day PRN  lactated ringers. 1000 milliLiter(s) IV Continuous <Continuous>  melatonin 3 milliGRAM(s) Oral at bedtime PRN  ondansetron Injectable 4 milliGRAM(s) IV Push every 8 hours PRN  pantoprazole    Tablet 20 milliGRAM(s) Oral before breakfast  progesterone 100 milliGRAM(s) Oral daily  vancomycin    Solution 500 milliGRAM(s) Oral every 6 hours      HEALTH ISSUES - PROBLEM Dx:  Clostridium difficile colitis    Prophylactic measure    Sepsis    Post menopausal syndrome

## 2025-05-20 NOTE — PROGRESS NOTE ADULT - PROVIDER SPECIALTY LIST ADULT
Colorectal Surgery
Colorectal Surgery
Hospitalist
Infectious Disease
Surgery
Colorectal Surgery
Colorectal Surgery
Internal Medicine
Internal Medicine
Surgery
Surgery
Infectious Disease
Internal Medicine
Surgery
Surgery
Infectious Disease
Internal Medicine
Internal Medicine

## 2025-05-20 NOTE — PROGRESS NOTE ADULT - REASON FOR ADMISSION
C Diff diarrhea

## 2025-05-20 NOTE — PROGRESS NOTE ADULT - PROBLEM SELECTOR PLAN 3
- CTM BMP, replete as needed

## 2025-05-20 NOTE — PROGRESS NOTE ADULT - PROBLEM SELECTOR PROBLEM 4
Post menopausal syndrome

## 2025-05-20 NOTE — DISCHARGE NOTE NURSING/CASE MANAGEMENT/SOCIAL WORK - NSDCPEFALRISK_GEN_ALL_CORE
For information on Fall & Injury Prevention, visit: [email or web address] OR  [email or web address] OR  https://www.cdc.gov/steadi/patient.html

## 2025-05-20 NOTE — DISCHARGE NOTE NURSING/CASE MANAGEMENT/SOCIAL WORK - PATIENT PORTAL LINK FT
You can access the FollowMyHealth Patient Portal offered by [facility] by registering at the following website: [email or web address]. By joining [facility]’s FollowMyHealth portal, you will also be able to view your health information using other applications (apps) compatible with our system.

## 2025-05-20 NOTE — DISCHARGE NOTE NURSING/CASE MANAGEMENT/SOCIAL WORK - FINANCIAL ASSISTANCE
[facility] provides services at a reduced cost to those who are determined to be eligible through [facility]’s financial assistance program. Information regarding [facility]’s financial assistance program can be found by going to [email or web address] or by calling [phone].

## 2025-05-20 NOTE — PROGRESS NOTE ADULT - ASSESSMENT
75F w/ hx diverticulitis, prior episode of ischemic colitis in 2016, GERD on PPI, and recent admission 4/6-11/25 for colitis, possibly ischemic, managed conservatively with IVF, bowel rest, and 5 days of ceftriaxone/flagyl. Then admitted 5/9-12/2025 with worsening diarrhea (was having 30 BM/day, loose, with cramping and chills), given a dose of cipro/flagyl but ultimately found to have CDI colitis (severe with WBC 19k), CTAP w/ PO contrast normal, treated with PO vanc and diarrhea/cramping/chills all resolved, WBC normalized and patient discharged home. Then came back to the following day (5/13) with worsening diarrhea and LLQ. WBC up to 17.5k -> 20k, repeat CTAP w/ PO contrast with new wall thickening of descending/rectosigmoid colon and marked stool throughout the large bowel concerning for fecal stasis. Lactate and vital signs normal. Clinically no toxic megacolon or ileus, but treated aggressively as worsened on standard vancomycin dosing - received high dose vanc 500mg PO q6h, as well as IV flagyl and AZ vanc and clinically improved, diarrhea/LLQ pain resolving, WBC normalized. AZ vanc stopped 5/17, IV flagyl stopped 5/18.    # CDI, first episode, severe  - Continue vancomycin 500mg PO q6h to complete a 14 day course from 5/13 (EOT 5/27)  - OK to discharge home from ID perspective  - Provided education about CDIFF, need for secondary ppx if on systemic abx in the next year.  - Gave ED return precautions for recurrent GI sx  - ID office will call patient to schedule f/u for 2 weeks    ID Team 2 will sign off. Please call if further ID input is desired. 75F w/ hx diverticulitis, prior episode of ischemic colitis in 2016, GERD on PPI, and recent admission 4/6-11/25 for colitis, possibly ischemic, managed conservatively with IVF, bowel rest, and 5 days of ceftriaxone/flagyl. Then admitted 5/9-12/2025 with worsening diarrhea (was having 30 BM/day, loose, with cramping and chills), given a dose of cipro/flagyl but ultimately found to have CDI colitis (severe with WBC 19k), CTAP w/ PO contrast normal, treated with PO vanc and diarrhea/cramping/chills all resolved, WBC normalized and patient discharged home. Then came back to the following day (5/13) with worsening diarrhea and LLQ. WBC up to 17.5k -> 20k, repeat CTAP w/ PO contrast with new wall thickening of descending/rectosigmoid colon and marked stool throughout the large bowel concerning for fecal stasis. Lactate and vital signs normal. Clinically no toxic megacolon or ileus, but treated aggressively as worsened on standard vancomycin dosing - received high dose vanc 500mg PO q6h, as well as IV flagyl and DC vanc and clinically improved, diarrhea/LLQ pain resolving, WBC normalized. DC vanc stopped 5/17, IV flagyl stopped 5/18.    # CDI, first episode, severe  - Continue vancomycin 500mg PO q6h to complete a 14 day course from 5/14 (EOT 5/27)  - OK to discharge home from ID perspective  - Provided education about CDIFF, need for secondary ppx if on systemic abx in the next year.  - Gave ED return precautions for recurrent GI sx  - ID office will call patient to schedule f/u for 2 weeks    ID Team 2 will sign off. Please call if further ID input is desired.

## 2025-05-20 NOTE — PROGRESS NOTE ADULT - TIME BILLING
Managing CDIFF
Managing CDI
Bedside exam and interview   Reviewed vitals, labs   Discussed patient's plan of care with housestaff   Documentation of encounter    Time documented on encounter excludes teaching and separately reported services
Bedside exam and interview   Reviewed vitals, labs   Discussed patient's plan of care with housestaff   Documentation of encounter  Excludes teaching time and/or separately reported services

## 2025-05-20 NOTE — PROGRESS NOTE ADULT - PROBLEM SELECTOR PROBLEM 2
Colitis due to Clostridioides difficile

## 2025-05-20 NOTE — PROGRESS NOTE ADULT - SUBJECTIVE AND OBJECTIVE BOX
INFECTIOUS DISEASES CONSULT FOLLOW-UP NOTE    INTERVAL HPI/OVERNIGHT EVENTS:  Afebrile, WBC normalized  Diarrhea much improved - 2 BMs yesterday, loose, less liquid. LLQ pain continues to improve, now very mild. Tolerating more PO intake - no nausea/vomiting.    ROS:   Constitutional, eyes, ENT, cardiovascular, respiratory, gastrointestinal, genitourinary, integumentary, neurological, psychiatric and heme/lymph are otherwise negative other than noted above       ANTIBIOTICS/RELEVANT:    MEDICATIONS  (STANDING):  chlorhexidine 2% Cloths 1 Application(s) Topical daily  enoxaparin Injectable 30 milliGRAM(s) SubCutaneous every 24 hours  estradiol 0.5 milliGRAM(s) Oral daily  lactated ringers. 1000 milliLiter(s) (100 mL/Hr) IV Continuous <Continuous>  pantoprazole    Tablet 20 milliGRAM(s) Oral before breakfast  progesterone 100 milliGRAM(s) Oral daily  vancomycin    Solution 500 milliGRAM(s) Oral every 6 hours    MEDICATIONS  (PRN):  acetaminophen     Tablet .. 650 milliGRAM(s) Oral every 6 hours PRN Temp greater or equal to 38C (100.4F), Mild Pain (1 - 3)  hyoscyamine SL 0.125 milliGRAM(s) SubLingual two times a day PRN Severe spasms  melatonin 3 milliGRAM(s) Oral at bedtime PRN Insomnia  ondansetron Injectable 4 milliGRAM(s) IV Push every 8 hours PRN Nausea and/or Vomiting        Vital Signs Last 24 Hrs  T(C): 36.8 (20 May 2025 11:15), Max: 36.8 (20 May 2025 11:15)  T(F): 98.3 (20 May 2025 11:15), Max: 98.3 (20 May 2025 11:15)  HR: 81 (20 May 2025 11:15) (73 - 89)  BP: 139/68 (20 May 2025 11:15) (134/75 - 155/76)  BP(mean): --  RR: 17 (20 May 2025 11:15) (17 - 18)  SpO2: 95% (20 May 2025 11:15) (95% - 98%)    Parameters below as of 20 May 2025 11:15  Patient On (Oxygen Delivery Method): room air        05-19-25 @ 07:01  -  05-20-25 @ 07:00  --------------------------------------------------------  IN: 920 mL / OUT: 0 mL / NET: 920 mL      PHYSICAL EXAM:  Constitutional: alert, NAD, non-toxic  Pulmonary: no respiratory distress  Heart: heart rate was normal and rhythm regular  Abdomen: soft, improved tenderness in LLQ, now minimal. No peritoneal signs  Neurological: no focal deficits.   Psychiatric: the affect was normal  Skin: no rash          LABS:                        11.9   9.86  )-----------( 237      ( 20 May 2025 08:15 )             36.6     05-20    142  |  105  |  9   ----------------------------<  98  4.3   |  27  |  0.79    Ca    8.9      20 May 2025 08:15  Phos  3.2     05-19  Mg     2.0     05-19    TPro  5.6[L]  /  Alb  3.4  /  TBili  0.2  /  DBili  x   /  AST  53[H]  /  ALT  32  /  AlkPhos  71  05-19      Urinalysis Basic - ( 20 May 2025 08:15 )    Color: x / Appearance: x / SG: x / pH: x  Gluc: 98 mg/dL / Ketone: x  / Bili: x / Urobili: x   Blood: x / Protein: x / Nitrite: x   Leuk Esterase: x / RBC: x / WBC x   Sq Epi: x / Non Sq Epi: x / Bacteria: x        MICROBIOLOGY:  Reviewed    RADIOLOGY & ADDITIONAL STUDIES:  Reviewed

## 2025-05-21 DIAGNOSIS — A04.72 ENTEROCOLITIS DUE TO CLOSTRIDIUM DIFFICILE, NOT SPECIFIED AS RECURRENT: ICD-10-CM

## 2025-05-21 RX ORDER — VANCOMYCIN HYDROCHLORIDE 125 MG/1
125 CAPSULE ORAL EVERY 6 HOURS
Qty: 88 | Refills: 0 | Status: ACTIVE | COMMUNITY
Start: 2025-05-21 | End: 1900-01-01

## 2025-05-27 ENCOUNTER — NON-APPOINTMENT (OUTPATIENT)
Age: 75
End: 2025-05-27

## 2025-05-27 RX ORDER — VANCOMYCIN HYDROCHLORIDE 250 MG/1
250 CAPSULE ORAL 4 TIMES DAILY
Qty: 28 | Refills: 0 | Status: ACTIVE | COMMUNITY
Start: 2025-05-27 | End: 1900-01-01

## 2025-05-28 ENCOUNTER — NON-APPOINTMENT (OUTPATIENT)
Age: 75
End: 2025-05-28

## 2025-05-29 DIAGNOSIS — Z88.2 ALLERGY STATUS TO SULFONAMIDES: ICD-10-CM

## 2025-05-29 DIAGNOSIS — Z88.0 ALLERGY STATUS TO PENICILLIN: ICD-10-CM

## 2025-05-29 DIAGNOSIS — E87.6 HYPOKALEMIA: ICD-10-CM

## 2025-05-29 DIAGNOSIS — K65.9 PERITONITIS, UNSPECIFIED: ICD-10-CM

## 2025-05-29 DIAGNOSIS — A04.72 ENTEROCOLITIS DUE TO CLOSTRIDIUM DIFFICILE, NOT SPECIFIED AS RECURRENT: ICD-10-CM

## 2025-05-29 DIAGNOSIS — Z91.013 ALLERGY TO SEAFOOD: ICD-10-CM

## 2025-05-29 DIAGNOSIS — Z91.041 RADIOGRAPHIC DYE ALLERGY STATUS: ICD-10-CM

## 2025-05-29 DIAGNOSIS — N95.9 UNSPECIFIED MENOPAUSAL AND PERIMENOPAUSAL DISORDER: ICD-10-CM

## 2025-05-29 DIAGNOSIS — A41.9 SEPSIS, UNSPECIFIED ORGANISM: ICD-10-CM

## 2025-05-29 DIAGNOSIS — Z91.048 OTHER NONMEDICINAL SUBSTANCE ALLERGY STATUS: ICD-10-CM

## 2025-05-29 DIAGNOSIS — R65.20 SEVERE SEPSIS WITHOUT SEPTIC SHOCK: ICD-10-CM

## 2025-05-29 DIAGNOSIS — Z87.891 PERSONAL HISTORY OF NICOTINE DEPENDENCE: ICD-10-CM

## 2025-05-29 DIAGNOSIS — K55.9 VASCULAR DISORDER OF INTESTINE, UNSPECIFIED: ICD-10-CM

## 2025-05-29 LAB
ANION GAP SERPL CALC-SCNC: 14 MMOL/L
BASOPHILS # BLD AUTO: 0.09 K/UL
BASOPHILS NFR BLD AUTO: 1.4 %
BUN SERPL-MCNC: 11 MG/DL
CALCIUM SERPL-MCNC: 9.9 MG/DL
CHLORIDE SERPL-SCNC: 100 MMOL/L
CO2 SERPL-SCNC: 25 MMOL/L
CREAT SERPL-MCNC: 0.76 MG/DL
EGFRCR SERPLBLD CKD-EPI 2021: 82 ML/MIN/1.73M2
EOSINOPHIL # BLD AUTO: 0.04 K/UL
EOSINOPHIL NFR BLD AUTO: 0.6 %
GLUCOSE SERPL-MCNC: 90 MG/DL
HCT VFR BLD CALC: 42.6 %
HGB BLD-MCNC: 13.1 G/DL
IMM GRANULOCYTES NFR BLD AUTO: 0.5 %
LYMPHOCYTES # BLD AUTO: 2 K/UL
LYMPHOCYTES NFR BLD AUTO: 31.1 %
MAN DIFF?: NORMAL
MCHC RBC-ENTMCNC: 28.4 PG
MCHC RBC-ENTMCNC: 30.8 G/DL
MCV RBC AUTO: 92.4 FL
MONOCYTES # BLD AUTO: 0.82 K/UL
MONOCYTES NFR BLD AUTO: 12.7 %
NEUTROPHILS # BLD AUTO: 3.46 K/UL
NEUTROPHILS NFR BLD AUTO: 53.7 %
PLATELET # BLD AUTO: 340 K/UL
POTASSIUM SERPL-SCNC: 4.2 MMOL/L
RBC # BLD: 4.61 M/UL
RBC # FLD: 15.2 %
SODIUM SERPL-SCNC: 139 MMOL/L
WBC # FLD AUTO: 6.44 K/UL

## 2025-05-30 ENCOUNTER — APPOINTMENT (OUTPATIENT)
Facility: CLINIC | Age: 75
End: 2025-05-30

## 2025-05-30 VITALS
WEIGHT: 110.23 LBS | HEART RATE: 92 BPM | TEMPERATURE: 97.4 F | DIASTOLIC BLOOD PRESSURE: 82 MMHG | OXYGEN SATURATION: 97 % | BODY MASS INDEX: 18.82 KG/M2 | HEIGHT: 64 IN | SYSTOLIC BLOOD PRESSURE: 138 MMHG

## 2025-05-30 PROCEDURE — 99215 OFFICE O/P EST HI 40 MIN: CPT

## 2025-06-02 PROCEDURE — 96374 THER/PROPH/DIAG INJ IV PUSH: CPT

## 2025-06-02 PROCEDURE — 93005 ELECTROCARDIOGRAM TRACING: CPT

## 2025-06-02 PROCEDURE — 81001 URINALYSIS AUTO W/SCOPE: CPT

## 2025-06-02 PROCEDURE — 36415 COLL VENOUS BLD VENIPUNCTURE: CPT

## 2025-06-02 PROCEDURE — 80048 BASIC METABOLIC PNL TOTAL CA: CPT

## 2025-06-02 PROCEDURE — 87507 IADNA-DNA/RNA PROBE TQ 12-25: CPT

## 2025-06-02 PROCEDURE — 83605 ASSAY OF LACTIC ACID: CPT

## 2025-06-02 PROCEDURE — 85025 COMPLETE CBC W/AUTO DIFF WBC: CPT

## 2025-06-02 PROCEDURE — 74176 CT ABD & PELVIS W/O CONTRAST: CPT

## 2025-06-02 PROCEDURE — 84100 ASSAY OF PHOSPHORUS: CPT

## 2025-06-02 PROCEDURE — 83735 ASSAY OF MAGNESIUM: CPT

## 2025-06-02 PROCEDURE — 80053 COMPREHEN METABOLIC PANEL: CPT

## 2025-06-02 PROCEDURE — 87449 NOS EACH ORGANISM AG IA: CPT

## 2025-06-02 PROCEDURE — 83690 ASSAY OF LIPASE: CPT

## 2025-06-02 PROCEDURE — 71045 X-RAY EXAM CHEST 1 VIEW: CPT

## 2025-06-02 PROCEDURE — 82962 GLUCOSE BLOOD TEST: CPT

## 2025-06-02 PROCEDURE — 85027 COMPLETE CBC AUTOMATED: CPT

## 2025-06-02 PROCEDURE — 99285 EMERGENCY DEPT VISIT HI MDM: CPT | Mod: 25

## 2025-06-02 PROCEDURE — 87324 CLOSTRIDIUM AG IA: CPT

## 2025-06-02 PROCEDURE — 96375 TX/PRO/DX INJ NEW DRUG ADDON: CPT

## 2025-06-02 PROCEDURE — 99285 EMERGENCY DEPT VISIT HI MDM: CPT

## 2025-06-11 ENCOUNTER — APPOINTMENT (OUTPATIENT)
Dept: INFECTIOUS DISEASE | Facility: CLINIC | Age: 75
End: 2025-06-11
Payer: MEDICARE

## 2025-06-11 PROCEDURE — 99214 OFFICE O/P EST MOD 30 MIN: CPT | Mod: 2W

## 2025-08-07 ENCOUNTER — TRANSCRIPTION ENCOUNTER (OUTPATIENT)
Age: 75
End: 2025-08-07